# Patient Record
Sex: FEMALE | Race: WHITE | NOT HISPANIC OR LATINO | Employment: OTHER | URBAN - NONMETROPOLITAN AREA
[De-identification: names, ages, dates, MRNs, and addresses within clinical notes are randomized per-mention and may not be internally consistent; named-entity substitution may affect disease eponyms.]

---

## 2019-10-02 ENCOUNTER — HOSPITAL ENCOUNTER (INPATIENT)
Facility: HOSPITAL | Age: 84
LOS: 1 days | Discharge: ANOTHER HOSPITAL | End: 2019-10-03
Attending: EMERGENCY MEDICINE | Admitting: HOSPITALIST

## 2019-10-02 ENCOUNTER — APPOINTMENT (OUTPATIENT)
Dept: GENERAL RADIOLOGY | Facility: HOSPITAL | Age: 84
End: 2019-10-02

## 2019-10-02 DIAGNOSIS — K85.00 IDIOPATHIC ACUTE PANCREATITIS WITHOUT INFECTION OR NECROSIS: Primary | ICD-10-CM

## 2019-10-02 DIAGNOSIS — R11.2 NAUSEA AND VOMITING IN ADULT PATIENT: ICD-10-CM

## 2019-10-02 DIAGNOSIS — A41.9 SEPSIS SECONDARY TO UTI (HCC): ICD-10-CM

## 2019-10-02 DIAGNOSIS — E86.0 DEHYDRATION: ICD-10-CM

## 2019-10-02 DIAGNOSIS — N39.0 SEPSIS SECONDARY TO UTI (HCC): ICD-10-CM

## 2019-10-02 LAB
A-A DO2: ABNORMAL
ARTERIAL PATENCY WRIST A: POSITIVE
ATMOSPHERIC PRESS: 728 MMHG
BASE EXCESS BLDA CALC-SCNC: -4.3 MMOL/L (ref 0–2)
BDY SITE: ABNORMAL
BODY TEMPERATURE: 0 C
CO2 BLDA-SCNC: 21.6 MMOL/L (ref 22–33)
COHGB MFR BLD: 0.4 % (ref 0–5)
HCO3 BLDA-SCNC: 20.5 MMOL/L (ref 20–26)
HGB BLDA-MCNC: 14.1 G/DL (ref 13.5–17.5)
HOROWITZ INDEX BLD+IHG-RTO: 21 %
Lab: ABNORMAL
METHGB BLD QL: 0.5 % (ref 0–3)
MODALITY: ABNORMAL
NOTE: ABNORMAL
OXYHGB MFR BLDV: 89 % (ref 94–99)
PCO2 BLDA: 36.1 MM HG (ref 35–45)
PCO2 TEMP ADJ BLD: ABNORMAL MM[HG]
PH BLDA: 7.36 PH UNITS (ref 7.35–7.45)
PH, TEMP CORRECTED: ABNORMAL
PO2 BLDA: 61 MM HG (ref 83–108)
PO2 TEMP ADJ BLD: ABNORMAL MM[HG]
SAO2 % BLDCOA: 89.8 % (ref 94–99)
VENTILATOR MODE: ABNORMAL

## 2019-10-02 PROCEDURE — 82375 ASSAY CARBOXYHB QUANT: CPT

## 2019-10-02 PROCEDURE — 99284 EMERGENCY DEPT VISIT MOD MDM: CPT

## 2019-10-02 PROCEDURE — 82805 BLOOD GASES W/O2 SATURATION: CPT

## 2019-10-02 PROCEDURE — 36415 COLL VENOUS BLD VENIPUNCTURE: CPT

## 2019-10-02 PROCEDURE — 93005 ELECTROCARDIOGRAM TRACING: CPT | Performed by: EMERGENCY MEDICINE

## 2019-10-02 PROCEDURE — 83050 HGB METHEMOGLOBIN QUAN: CPT

## 2019-10-02 PROCEDURE — P9612 CATHETERIZE FOR URINE SPEC: HCPCS

## 2019-10-02 PROCEDURE — 36600 WITHDRAWAL OF ARTERIAL BLOOD: CPT

## 2019-10-02 PROCEDURE — 71045 X-RAY EXAM CHEST 1 VIEW: CPT

## 2019-10-02 RX ORDER — SODIUM CHLORIDE 0.9 % (FLUSH) 0.9 %
10 SYRINGE (ML) INJECTION AS NEEDED
Status: DISCONTINUED | OUTPATIENT
Start: 2019-10-02 | End: 2019-10-04 | Stop reason: HOSPADM

## 2019-10-02 RX ORDER — ONDANSETRON 2 MG/ML
4 INJECTION INTRAMUSCULAR; INTRAVENOUS ONCE
Status: COMPLETED | OUTPATIENT
Start: 2019-10-02 | End: 2019-10-03

## 2019-10-03 ENCOUNTER — APPOINTMENT (OUTPATIENT)
Dept: CT IMAGING | Facility: HOSPITAL | Age: 84
End: 2019-10-03

## 2019-10-03 ENCOUNTER — APPOINTMENT (OUTPATIENT)
Dept: MRI IMAGING | Facility: HOSPITAL | Age: 84
End: 2019-10-03

## 2019-10-03 ENCOUNTER — APPOINTMENT (OUTPATIENT)
Dept: ULTRASOUND IMAGING | Facility: HOSPITAL | Age: 84
End: 2019-10-03

## 2019-10-03 ENCOUNTER — APPOINTMENT (OUTPATIENT)
Dept: CARDIOLOGY | Facility: HOSPITAL | Age: 84
End: 2019-10-03

## 2019-10-03 ENCOUNTER — APPOINTMENT (OUTPATIENT)
Dept: GENERAL RADIOLOGY | Facility: HOSPITAL | Age: 84
End: 2019-10-03

## 2019-10-03 VITALS
BODY MASS INDEX: 32.98 KG/M2 | SYSTOLIC BLOOD PRESSURE: 125 MMHG | TEMPERATURE: 98.2 F | RESPIRATION RATE: 20 BRPM | WEIGHT: 168 LBS | OXYGEN SATURATION: 95 % | HEART RATE: 73 BPM | HEIGHT: 60 IN | DIASTOLIC BLOOD PRESSURE: 45 MMHG

## 2019-10-03 PROBLEM — K85.90 ACUTE PANCREATITIS: Status: ACTIVE | Noted: 2019-10-03

## 2019-10-03 LAB
ALBUMIN SERPL-MCNC: 2.67 G/DL (ref 3.5–5.2)
ALBUMIN SERPL-MCNC: 2.83 G/DL (ref 3.5–5.2)
ALBUMIN/GLOB SERPL: 0.8 G/DL
ALBUMIN/GLOB SERPL: 0.8 G/DL
ALP SERPL-CCNC: 137 U/L (ref 39–117)
ALP SERPL-CCNC: 145 U/L (ref 39–117)
ALT SERPL W P-5'-P-CCNC: 38 U/L (ref 1–33)
ALT SERPL W P-5'-P-CCNC: 41 U/L (ref 1–33)
AMYLASE SERPL-CCNC: 789 U/L (ref 28–100)
ANION GAP SERPL CALCULATED.3IONS-SCNC: 14.3 MMOL/L (ref 5–15)
ANION GAP SERPL CALCULATED.3IONS-SCNC: 14.4 MMOL/L (ref 5–15)
APTT PPP: 29.4 SECONDS (ref 23.8–36.1)
AST SERPL-CCNC: 93 U/L (ref 1–32)
AST SERPL-CCNC: 95 U/L (ref 1–32)
BACTERIA UR QL AUTO: ABNORMAL /HPF
BASOPHILS # BLD AUTO: 0.01 10*3/MM3 (ref 0–0.2)
BASOPHILS # BLD AUTO: 0.01 10*3/MM3 (ref 0–0.2)
BASOPHILS NFR BLD AUTO: 0.1 % (ref 0–1.5)
BASOPHILS NFR BLD AUTO: 0.1 % (ref 0–1.5)
BILIRUB SERPL-MCNC: 0.9 MG/DL (ref 0.2–1.2)
BILIRUB SERPL-MCNC: 1.1 MG/DL (ref 0.2–1.2)
BILIRUB UR QL STRIP: NEGATIVE
BUN BLD-MCNC: 17 MG/DL (ref 8–23)
BUN BLD-MCNC: 19 MG/DL (ref 8–23)
BUN/CREAT SERPL: 14.7 (ref 7–25)
BUN/CREAT SERPL: 15.8 (ref 7–25)
CALCIUM SPEC-SCNC: 9.2 MG/DL (ref 8.6–10.5)
CALCIUM SPEC-SCNC: 9.4 MG/DL (ref 8.6–10.5)
CHLORIDE SERPL-SCNC: 104 MMOL/L (ref 98–107)
CHLORIDE SERPL-SCNC: 104 MMOL/L (ref 98–107)
CHOLEST SERPL-MCNC: 78 MG/DL (ref 0–200)
CLARITY UR: ABNORMAL
CO2 SERPL-SCNC: 20.7 MMOL/L (ref 22–29)
CO2 SERPL-SCNC: 21.6 MMOL/L (ref 22–29)
COLOR UR: ABNORMAL
CREAT BLD-MCNC: 1.16 MG/DL (ref 0.57–1)
CREAT BLD-MCNC: 1.2 MG/DL (ref 0.57–1)
CRP SERPL-MCNC: 0.62 MG/DL (ref 0–0.5)
D-LACTATE SERPL-SCNC: 1.2 MMOL/L (ref 0.5–2)
D-LACTATE SERPL-SCNC: 2.9 MMOL/L (ref 0.5–2)
D-LACTATE SERPL-SCNC: 3.7 MMOL/L (ref 0.5–2)
DEPRECATED RDW RBC AUTO: 53.6 FL (ref 37–54)
DEPRECATED RDW RBC AUTO: 55.3 FL (ref 37–54)
EOSINOPHIL # BLD AUTO: 0 10*3/MM3 (ref 0–0.4)
EOSINOPHIL # BLD AUTO: 0 10*3/MM3 (ref 0–0.4)
EOSINOPHIL NFR BLD AUTO: 0 % (ref 0.3–6.2)
EOSINOPHIL NFR BLD AUTO: 0 % (ref 0.3–6.2)
ERYTHROCYTE [DISTWIDTH] IN BLOOD BY AUTOMATED COUNT: 15.4 % (ref 12.3–15.4)
ERYTHROCYTE [DISTWIDTH] IN BLOOD BY AUTOMATED COUNT: 15.4 % (ref 12.3–15.4)
GFR SERPL CREATININE-BSD FRML MDRD: 43 ML/MIN/1.73
GFR SERPL CREATININE-BSD FRML MDRD: 45 ML/MIN/1.73
GLOBULIN UR ELPH-MCNC: 3.4 GM/DL
GLOBULIN UR ELPH-MCNC: 3.5 GM/DL
GLUCOSE BLD-MCNC: 106 MG/DL (ref 65–99)
GLUCOSE BLD-MCNC: 117 MG/DL (ref 65–99)
GLUCOSE UR STRIP-MCNC: NEGATIVE MG/DL
GRAN CASTS URNS QL MICRO: ABNORMAL /LPF
HCT VFR BLD AUTO: 40.6 % (ref 34–46.6)
HCT VFR BLD AUTO: 41.8 % (ref 34–46.6)
HDLC SERPL-MCNC: 35 MG/DL (ref 40–60)
HGB BLD-MCNC: 13.3 G/DL (ref 12–15.9)
HGB BLD-MCNC: 13.7 G/DL (ref 12–15.9)
HGB UR QL STRIP.AUTO: ABNORMAL
HOLD SPECIMEN: NORMAL
HYALINE CASTS UR QL AUTO: ABNORMAL /LPF
IMM GRANULOCYTES # BLD AUTO: 0.03 10*3/MM3 (ref 0–0.05)
IMM GRANULOCYTES # BLD AUTO: 0.05 10*3/MM3 (ref 0–0.05)
IMM GRANULOCYTES NFR BLD AUTO: 0.3 % (ref 0–0.5)
IMM GRANULOCYTES NFR BLD AUTO: 0.3 % (ref 0–0.5)
INR PPP: 1.14 (ref 0.9–1.1)
KETONES UR QL STRIP: ABNORMAL
LDLC SERPL CALC-MCNC: 35 MG/DL (ref 0–100)
LDLC/HDLC SERPL: 0.99 {RATIO}
LEUKOCYTE ESTERASE UR QL STRIP.AUTO: ABNORMAL
LIPASE SERPL-CCNC: >3000 U/L (ref 13–60)
LIPASE SERPL-CCNC: >3000 U/L (ref 13–60)
LYMPHOCYTES # BLD AUTO: 0.76 10*3/MM3 (ref 0.7–3.1)
LYMPHOCYTES # BLD AUTO: 1.22 10*3/MM3 (ref 0.7–3.1)
LYMPHOCYTES NFR BLD AUTO: 10.3 % (ref 19.6–45.3)
LYMPHOCYTES NFR BLD AUTO: 5.2 % (ref 19.6–45.3)
MAGNESIUM SERPL-MCNC: 1.4 MG/DL (ref 1.6–2.4)
MAGNESIUM SERPL-MCNC: 1.5 MG/DL (ref 1.6–2.4)
MAGNESIUM SERPL-MCNC: 2.5 MG/DL (ref 1.6–2.4)
MCH RBC QN AUTO: 32.4 PG (ref 26.6–33)
MCH RBC QN AUTO: 32.8 PG (ref 26.6–33)
MCHC RBC AUTO-ENTMCNC: 32.8 G/DL (ref 31.5–35.7)
MCHC RBC AUTO-ENTMCNC: 32.8 G/DL (ref 31.5–35.7)
MCV RBC AUTO: 100 FL (ref 79–97)
MCV RBC AUTO: 99 FL (ref 79–97)
MONOCYTES # BLD AUTO: 0.57 10*3/MM3 (ref 0.1–0.9)
MONOCYTES # BLD AUTO: 1.24 10*3/MM3 (ref 0.1–0.9)
MONOCYTES NFR BLD AUTO: 4.8 % (ref 5–12)
MONOCYTES NFR BLD AUTO: 8.5 % (ref 5–12)
NEUTROPHILS # BLD AUTO: 12.48 10*3/MM3 (ref 1.7–7)
NEUTROPHILS # BLD AUTO: 9.99 10*3/MM3 (ref 1.7–7)
NEUTROPHILS NFR BLD AUTO: 84.5 % (ref 42.7–76)
NEUTROPHILS NFR BLD AUTO: 85.9 % (ref 42.7–76)
NITRITE UR QL STRIP: NEGATIVE
PH UR STRIP.AUTO: 6.5 [PH] (ref 5–8)
PHOSPHATE SERPL-MCNC: 3.5 MG/DL (ref 2.5–4.5)
PLATELET # BLD AUTO: 163 10*3/MM3 (ref 140–450)
PLATELET # BLD AUTO: 171 10*3/MM3 (ref 140–450)
PMV BLD AUTO: 10.5 FL (ref 6–12)
PMV BLD AUTO: 11.2 FL (ref 6–12)
POTASSIUM BLD-SCNC: 3.7 MMOL/L (ref 3.5–5.2)
POTASSIUM BLD-SCNC: 4.4 MMOL/L (ref 3.5–5.2)
PROT SERPL-MCNC: 6.2 G/DL (ref 6–8.5)
PROT SERPL-MCNC: 6.2 G/DL (ref 6–8.5)
PROT UR QL STRIP: ABNORMAL
PROTHROMBIN TIME: 15.2 SECONDS (ref 11–15.4)
RBC # BLD AUTO: 4.1 10*6/MM3 (ref 3.77–5.28)
RBC # BLD AUTO: 4.18 10*6/MM3 (ref 3.77–5.28)
RBC # UR: ABNORMAL /HPF
REF LAB TEST METHOD: ABNORMAL
SODIUM BLD-SCNC: 139 MMOL/L (ref 136–145)
SODIUM BLD-SCNC: 140 MMOL/L (ref 136–145)
SP GR UR STRIP: 1.01 (ref 1–1.03)
SQUAMOUS #/AREA URNS HPF: ABNORMAL /HPF
TRANS CELLS #/AREA URNS HPF: ABNORMAL /HPF
TRIGL SERPL-MCNC: 40 MG/DL (ref 0–150)
TRIGL SERPL-MCNC: 42 MG/DL (ref 0–150)
TROPONIN T SERPL-MCNC: <0.01 NG/ML (ref 0–0.03)
UROBILINOGEN UR QL STRIP: ABNORMAL
VLDLC SERPL-MCNC: 8.4 MG/DL
WBC NRBC COR # BLD: 11.82 10*3/MM3 (ref 3.4–10.8)
WBC NRBC COR # BLD: 14.54 10*3/MM3 (ref 3.4–10.8)
WBC UR QL AUTO: ABNORMAL /HPF
WHOLE BLOOD HOLD SPECIMEN: NORMAL
WHOLE BLOOD HOLD SPECIMEN: NORMAL

## 2019-10-03 PROCEDURE — 93880 EXTRACRANIAL BILAT STUDY: CPT | Performed by: RADIOLOGY

## 2019-10-03 PROCEDURE — 25010000002 CEFTRIAXONE: Performed by: EMERGENCY MEDICINE

## 2019-10-03 PROCEDURE — 85730 THROMBOPLASTIN TIME PARTIAL: CPT | Performed by: EMERGENCY MEDICINE

## 2019-10-03 PROCEDURE — 74176 CT ABD & PELVIS W/O CONTRAST: CPT

## 2019-10-03 PROCEDURE — 87077 CULTURE AEROBIC IDENTIFY: CPT | Performed by: EMERGENCY MEDICINE

## 2019-10-03 PROCEDURE — 86140 C-REACTIVE PROTEIN: CPT | Performed by: EMERGENCY MEDICINE

## 2019-10-03 PROCEDURE — 93880 EXTRACRANIAL BILAT STUDY: CPT

## 2019-10-03 PROCEDURE — 73610 X-RAY EXAM OF ANKLE: CPT | Performed by: RADIOLOGY

## 2019-10-03 PROCEDURE — 70450 CT HEAD/BRAIN W/O DYE: CPT

## 2019-10-03 PROCEDURE — 99236 HOSP IP/OBS SAME DATE HI 85: CPT | Performed by: HOSPITALIST

## 2019-10-03 PROCEDURE — 25010000002 MORPHINE PER 10 MG: Performed by: HOSPITALIST

## 2019-10-03 PROCEDURE — 82150 ASSAY OF AMYLASE: CPT | Performed by: HOSPITALIST

## 2019-10-03 PROCEDURE — 94799 UNLISTED PULMONARY SVC/PX: CPT

## 2019-10-03 PROCEDURE — 83605 ASSAY OF LACTIC ACID: CPT | Performed by: HOSPITALIST

## 2019-10-03 PROCEDURE — 80053 COMPREHEN METABOLIC PANEL: CPT | Performed by: EMERGENCY MEDICINE

## 2019-10-03 PROCEDURE — 83690 ASSAY OF LIPASE: CPT | Performed by: EMERGENCY MEDICINE

## 2019-10-03 PROCEDURE — 84484 ASSAY OF TROPONIN QUANT: CPT | Performed by: EMERGENCY MEDICINE

## 2019-10-03 PROCEDURE — 80061 LIPID PANEL: CPT | Performed by: HOSPITALIST

## 2019-10-03 PROCEDURE — 84100 ASSAY OF PHOSPHORUS: CPT | Performed by: EMERGENCY MEDICINE

## 2019-10-03 PROCEDURE — 83605 ASSAY OF LACTIC ACID: CPT | Performed by: EMERGENCY MEDICINE

## 2019-10-03 PROCEDURE — 74181 MRI ABDOMEN W/O CONTRAST: CPT

## 2019-10-03 PROCEDURE — 83735 ASSAY OF MAGNESIUM: CPT | Performed by: EMERGENCY MEDICINE

## 2019-10-03 PROCEDURE — 87086 URINE CULTURE/COLONY COUNT: CPT | Performed by: EMERGENCY MEDICINE

## 2019-10-03 PROCEDURE — 25010000002 ONDANSETRON PER 1 MG: Performed by: EMERGENCY MEDICINE

## 2019-10-03 PROCEDURE — 25010000002 HEPARIN (PORCINE) PER 1000 UNITS: Performed by: HOSPITALIST

## 2019-10-03 PROCEDURE — 85025 COMPLETE CBC W/AUTO DIFF WBC: CPT | Performed by: HOSPITALIST

## 2019-10-03 PROCEDURE — 81001 URINALYSIS AUTO W/SCOPE: CPT | Performed by: EMERGENCY MEDICINE

## 2019-10-03 PROCEDURE — 80053 COMPREHEN METABOLIC PANEL: CPT | Performed by: HOSPITALIST

## 2019-10-03 PROCEDURE — 85610 PROTHROMBIN TIME: CPT | Performed by: EMERGENCY MEDICINE

## 2019-10-03 PROCEDURE — 84478 ASSAY OF TRIGLYCERIDES: CPT | Performed by: HOSPITALIST

## 2019-10-03 PROCEDURE — 83735 ASSAY OF MAGNESIUM: CPT | Performed by: HOSPITALIST

## 2019-10-03 PROCEDURE — 87186 SC STD MICRODIL/AGAR DIL: CPT | Performed by: EMERGENCY MEDICINE

## 2019-10-03 PROCEDURE — 25010000002 MAGNESIUM SULFATE IN D5W 1G/100ML (PREMIX) 1-5 GM/100ML-% SOLUTION: Performed by: HOSPITALIST

## 2019-10-03 PROCEDURE — 73610 X-RAY EXAM OF ANKLE: CPT

## 2019-10-03 PROCEDURE — 83690 ASSAY OF LIPASE: CPT | Performed by: HOSPITALIST

## 2019-10-03 PROCEDURE — 74181 MRI ABDOMEN W/O CONTRAST: CPT | Performed by: RADIOLOGY

## 2019-10-03 PROCEDURE — 76705 ECHO EXAM OF ABDOMEN: CPT

## 2019-10-03 PROCEDURE — 85025 COMPLETE CBC W/AUTO DIFF WBC: CPT | Performed by: EMERGENCY MEDICINE

## 2019-10-03 PROCEDURE — 87040 BLOOD CULTURE FOR BACTERIA: CPT | Performed by: EMERGENCY MEDICINE

## 2019-10-03 RX ORDER — SODIUM CHLORIDE 0.9 % (FLUSH) 0.9 %
10 SYRINGE (ML) INJECTION EVERY 12 HOURS SCHEDULED
Status: DISCONTINUED | OUTPATIENT
Start: 2019-10-03 | End: 2019-10-04 | Stop reason: HOSPADM

## 2019-10-03 RX ORDER — HEPARIN SODIUM 5000 [USP'U]/ML
5000 INJECTION, SOLUTION INTRAVENOUS; SUBCUTANEOUS EVERY 12 HOURS SCHEDULED
Start: 2019-10-03 | End: 2019-10-06 | Stop reason: HOSPADM

## 2019-10-03 RX ORDER — MAGNESIUM SULFATE 1 G/100ML
1 INJECTION INTRAVENOUS
Status: COMPLETED | OUTPATIENT
Start: 2019-10-03 | End: 2019-10-03

## 2019-10-03 RX ORDER — LIDOCAINE 50 MG/G
1 PATCH TOPICAL
Start: 2019-10-04 | End: 2019-10-06 | Stop reason: HOSPADM

## 2019-10-03 RX ORDER — TRIAMTERENE AND HYDROCHLOROTHIAZIDE 37.5; 25 MG/1; MG/1
1 TABLET ORAL DAILY
COMMUNITY
End: 2019-10-04 | Stop reason: HOSPADM

## 2019-10-03 RX ORDER — NALOXONE HCL 0.4 MG/ML
0.4 VIAL (ML) INJECTION
Status: DISCONTINUED | OUTPATIENT
Start: 2019-10-03 | End: 2019-10-03

## 2019-10-03 RX ORDER — MAGNESIUM SULFATE 1 G/100ML
1 INJECTION INTRAVENOUS AS NEEDED
Status: DISCONTINUED | OUTPATIENT
Start: 2019-10-03 | End: 2019-10-04 | Stop reason: HOSPADM

## 2019-10-03 RX ORDER — FAMOTIDINE 10 MG/ML
20 INJECTION, SOLUTION INTRAVENOUS DAILY
Status: DISCONTINUED | OUTPATIENT
Start: 2019-10-03 | End: 2019-10-04 | Stop reason: HOSPADM

## 2019-10-03 RX ORDER — L.ACID,PARA/B.BIFIDUM/S.THERM 8B CELL
1 CAPSULE ORAL DAILY
Status: ON HOLD
Start: 2019-10-04 | End: 2019-10-06 | Stop reason: SDUPTHER

## 2019-10-03 RX ORDER — MAGNESIUM SULFATE HEPTAHYDRATE 40 MG/ML
4 INJECTION, SOLUTION INTRAVENOUS AS NEEDED
Start: 2019-10-03 | End: 2019-10-06 | Stop reason: HOSPADM

## 2019-10-03 RX ORDER — FAMOTIDINE 10 MG/ML
20 INJECTION, SOLUTION INTRAVENOUS DAILY
Start: 2019-10-04 | End: 2019-10-16 | Stop reason: HOSPADM

## 2019-10-03 RX ORDER — MORPHINE SULFATE 2 MG/ML
2 INJECTION, SOLUTION INTRAMUSCULAR; INTRAVENOUS EVERY 4 HOURS PRN
Status: DISCONTINUED | OUTPATIENT
Start: 2019-10-03 | End: 2019-10-04 | Stop reason: HOSPADM

## 2019-10-03 RX ORDER — PROMETHAZINE HYDROCHLORIDE 12.5 MG/1
12.5 TABLET ORAL EVERY 6 HOURS PRN
Status: DISCONTINUED | OUTPATIENT
Start: 2019-10-03 | End: 2019-10-04 | Stop reason: HOSPADM

## 2019-10-03 RX ORDER — L.ACID,PARA/B.BIFIDUM/S.THERM 8B CELL
1 CAPSULE ORAL DAILY
Status: DISCONTINUED | OUTPATIENT
Start: 2019-10-03 | End: 2019-10-04 | Stop reason: HOSPADM

## 2019-10-03 RX ORDER — ONDANSETRON 2 MG/ML
4 INJECTION INTRAMUSCULAR; INTRAVENOUS EVERY 6 HOURS PRN
Start: 2019-10-03 | End: 2019-10-16 | Stop reason: HOSPADM

## 2019-10-03 RX ORDER — SODIUM CHLORIDE 0.9 % (FLUSH) 0.9 %
10 SYRINGE (ML) INJECTION AS NEEDED
Status: DISCONTINUED | OUTPATIENT
Start: 2019-10-03 | End: 2019-10-04 | Stop reason: HOSPADM

## 2019-10-03 RX ORDER — PROMETHAZINE HYDROCHLORIDE 12.5 MG/1
12.5 TABLET ORAL EVERY 6 HOURS PRN
Start: 2019-10-03 | End: 2019-10-06 | Stop reason: HOSPADM

## 2019-10-03 RX ORDER — ENALAPRIL MALEATE 10 MG/1
20 TABLET ORAL DAILY
Status: CANCELLED | OUTPATIENT
Start: 2019-10-03

## 2019-10-03 RX ORDER — HEPARIN SODIUM 5000 [USP'U]/ML
5000 INJECTION, SOLUTION INTRAVENOUS; SUBCUTANEOUS EVERY 12 HOURS SCHEDULED
Status: DISCONTINUED | OUTPATIENT
Start: 2019-10-03 | End: 2019-10-03

## 2019-10-03 RX ORDER — NALOXONE HCL 0.4 MG/ML
0.4 VIAL (ML) INJECTION
Status: DISCONTINUED | OUTPATIENT
Start: 2019-10-03 | End: 2019-10-04 | Stop reason: HOSPADM

## 2019-10-03 RX ORDER — SODIUM CHLORIDE 9 MG/ML
125 INJECTION, SOLUTION INTRAVENOUS CONTINUOUS
Start: 2019-10-03 | End: 2019-10-06 | Stop reason: HOSPADM

## 2019-10-03 RX ORDER — MORPHINE SULFATE 2 MG/ML
2 INJECTION, SOLUTION INTRAMUSCULAR; INTRAVENOUS EVERY 4 HOURS PRN
Start: 2019-10-03 | End: 2019-10-06 | Stop reason: HOSPADM

## 2019-10-03 RX ORDER — MORPHINE SULFATE 2 MG/ML
1 INJECTION, SOLUTION INTRAMUSCULAR; INTRAVENOUS EVERY 4 HOURS PRN
Status: DISCONTINUED | OUTPATIENT
Start: 2019-10-03 | End: 2019-10-03

## 2019-10-03 RX ORDER — MAGNESIUM SULFATE 1 G/100ML
1 INJECTION INTRAVENOUS AS NEEDED
Start: 2019-10-03 | End: 2019-10-06 | Stop reason: HOSPADM

## 2019-10-03 RX ORDER — ENALAPRIL MALEATE 20 MG/1
20 TABLET ORAL DAILY
COMMUNITY
End: 2019-10-04 | Stop reason: HOSPADM

## 2019-10-03 RX ORDER — SODIUM CHLORIDE 9 MG/ML
125 INJECTION, SOLUTION INTRAVENOUS CONTINUOUS
Status: DISCONTINUED | OUTPATIENT
Start: 2019-10-03 | End: 2019-10-04 | Stop reason: HOSPADM

## 2019-10-03 RX ORDER — NALOXONE HCL 0.4 MG/ML
0.4 VIAL (ML) INJECTION
Start: 2019-10-03 | End: 2019-10-06 | Stop reason: HOSPADM

## 2019-10-03 RX ORDER — MAGNESIUM SULFATE HEPTAHYDRATE 40 MG/ML
4 INJECTION, SOLUTION INTRAVENOUS AS NEEDED
Status: DISCONTINUED | OUTPATIENT
Start: 2019-10-03 | End: 2019-10-04 | Stop reason: HOSPADM

## 2019-10-03 RX ORDER — TRIAMTERENE AND HYDROCHLOROTHIAZIDE 37.5; 25 MG/1; MG/1
1 TABLET ORAL DAILY
Status: CANCELLED | OUTPATIENT
Start: 2019-10-03

## 2019-10-03 RX ORDER — LIDOCAINE 50 MG/G
1 PATCH TOPICAL
Status: DISCONTINUED | OUTPATIENT
Start: 2019-10-03 | End: 2019-10-04 | Stop reason: HOSPADM

## 2019-10-03 RX ORDER — ONDANSETRON 2 MG/ML
4 INJECTION INTRAMUSCULAR; INTRAVENOUS EVERY 6 HOURS PRN
Status: DISCONTINUED | OUTPATIENT
Start: 2019-10-03 | End: 2019-10-04 | Stop reason: HOSPADM

## 2019-10-03 RX ORDER — MAGNESIUM SULFATE HEPTAHYDRATE 40 MG/ML
2 INJECTION, SOLUTION INTRAVENOUS AS NEEDED
Status: DISCONTINUED | OUTPATIENT
Start: 2019-10-03 | End: 2019-10-04 | Stop reason: HOSPADM

## 2019-10-03 RX ORDER — HEPARIN SODIUM 5000 [USP'U]/ML
5000 INJECTION, SOLUTION INTRAVENOUS; SUBCUTANEOUS EVERY 8 HOURS SCHEDULED
Status: DISCONTINUED | OUTPATIENT
Start: 2019-10-03 | End: 2019-10-04 | Stop reason: HOSPADM

## 2019-10-03 RX ORDER — MAGNESIUM SULFATE HEPTAHYDRATE 40 MG/ML
2 INJECTION, SOLUTION INTRAVENOUS AS NEEDED
Start: 2019-10-03 | End: 2019-10-06 | Stop reason: HOSPADM

## 2019-10-03 RX ADMIN — MAGNESIUM SULFATE IN DEXTROSE 1 G: 10 INJECTION, SOLUTION INTRAVENOUS at 08:07

## 2019-10-03 RX ADMIN — SODIUM CHLORIDE, PRESERVATIVE FREE 10 ML: 5 INJECTION INTRAVENOUS at 21:38

## 2019-10-03 RX ADMIN — SODIUM CHLORIDE 125 ML/HR: 9 INJECTION, SOLUTION INTRAVENOUS at 18:04

## 2019-10-03 RX ADMIN — HEPARIN SODIUM 5000 UNITS: 5000 INJECTION INTRAVENOUS; SUBCUTANEOUS at 08:07

## 2019-10-03 RX ADMIN — MAGNESIUM SULFATE IN DEXTROSE 1 G: 10 INJECTION, SOLUTION INTRAVENOUS at 09:46

## 2019-10-03 RX ADMIN — MORPHINE SULFATE 1 MG: 2 INJECTION, SOLUTION INTRAMUSCULAR; INTRAVENOUS at 06:32

## 2019-10-03 RX ADMIN — CEFTRIAXONE 1 G: 1 INJECTION, POWDER, FOR SOLUTION INTRAMUSCULAR; INTRAVENOUS at 02:31

## 2019-10-03 RX ADMIN — METRONIDAZOLE 500 MG: 500 INJECTION, SOLUTION INTRAVENOUS at 18:09

## 2019-10-03 RX ADMIN — MORPHINE SULFATE 1 MG: 2 INJECTION, SOLUTION INTRAMUSCULAR; INTRAVENOUS at 15:11

## 2019-10-03 RX ADMIN — SODIUM CHLORIDE, PRESERVATIVE FREE 10 ML: 5 INJECTION INTRAVENOUS at 08:07

## 2019-10-03 RX ADMIN — SODIUM CHLORIDE 125 ML/HR: 9 INJECTION, SOLUTION INTRAVENOUS at 05:26

## 2019-10-03 RX ADMIN — MAGNESIUM SULFATE IN DEXTROSE 1 G: 10 INJECTION, SOLUTION INTRAVENOUS at 06:22

## 2019-10-03 RX ADMIN — ONDANSETRON 4 MG: 2 INJECTION INTRAMUSCULAR; INTRAVENOUS at 00:08

## 2019-10-03 RX ADMIN — LIDOCAINE 1 PATCH: 50 PATCH CUTANEOUS at 21:35

## 2019-10-03 RX ADMIN — Medication 1 CAPSULE: at 13:02

## 2019-10-03 RX ADMIN — MORPHINE SULFATE 2 MG: 2 INJECTION, SOLUTION INTRAMUSCULAR; INTRAVENOUS at 21:37

## 2019-10-03 RX ADMIN — SODIUM CHLORIDE 1000 ML: 9 INJECTION, SOLUTION INTRAVENOUS at 00:08

## 2019-10-03 RX ADMIN — SODIUM CHLORIDE, PRESERVATIVE FREE 10 ML: 5 INJECTION INTRAVENOUS at 10:09

## 2019-10-03 RX ADMIN — FAMOTIDINE 20 MG: 10 INJECTION, SOLUTION INTRAVENOUS at 10:09

## 2019-10-03 RX ADMIN — HEPARIN SODIUM 5000 UNITS: 5000 INJECTION INTRAVENOUS; SUBCUTANEOUS at 21:37

## 2019-10-03 RX ADMIN — HEPARIN SODIUM 5000 UNITS: 5000 INJECTION INTRAVENOUS; SUBCUTANEOUS at 14:34

## 2019-10-03 RX ADMIN — SODIUM CHLORIDE 1200 ML: 9 INJECTION, SOLUTION INTRAVENOUS at 10:08

## 2019-10-03 NOTE — PROGRESS NOTES
[facility] HOSPITALIST PROGRESS NOTE     Patient Identification:  Name:  Janeth Mitchell  Age:  84 y.o.  Sex:  female  :  1935  MRN:  [id number]  Visit Number:  [id number]  ROOM: [address]     Primary Care Provider:  Alexx Tafoya MD    Length of stay:  0    Subjective        Chief Compliant Follow up for pancreatitis    Patient seen and examined this morning with daughter and KENNETH Pearce. Doing better. Abdominal pain much improved. Denies nausea and vomiting. Denies chest pain, shortness of breath, cough. Afebrile since admission. Has left ankle pain. On RA.       Objective     Current Hospital Meds:    [START ON 10/4/2019] ceftriaxone 1 g Intravenous Q24H   famotidine 20 mg Intravenous Daily   heparin (porcine) 5,000 Units Subcutaneous Q8H   lactobacillus acidophilus 1 capsule Oral Daily   sodium chloride 10 mL Intravenous Q12H   sodium chloride 10 mL Intravenous Q12H       sodium chloride 125 mL/hr Last Rate: 125 mL/hr (10/03/19 0526)     ----------------------------------------------------------------------------------------------------------------------  Vital Signs:  Temp:  [97.9 °F (36.6 °C)-98.5 °F (36.9 °C)] 98.5 °F (36.9 °C)  Heart Rate:  [63-89] 63  Resp:  [11-20] 11  BP: ()/(37-73) 138/67  SpO2:  [92 %-100 %] 99 %  on   ;   Device (Oxygen Therapy): room air  Body mass index is 32.81 kg/m².    Wt Readings from Last 3 Encounters:   10/03/19 76.2 kg (168 lb)       Intake/Output Summary (Last 24 hours) at 10/3/2019 1506  Last data filed at 10/3/2019 1300  Gross per 24 hour   Intake 3300 ml   Output 100 ml   Net 3200 ml     NPO Diet  ----------------------------------------------------------------------------------------------------------------------  Physical exam:  General: Comfortable, Not in distress.  Well-developed and well-nourished.   HENT:  Head:  Normocephalic and atraumatic.  Mouth:  Moist mucous membranes.    Eyes:  Conjunctivae and EOM are normal.  Pupils are  equal, round, and reactive to light.  No scleral icterus.    Neck:  Neck supple.  No JVD present. Trachea midline.     Cardiovascular:  Normal rate, regular rhythm with no murmur.  Pulmonary/Chest:  No respiratory distress, no wheezes, no crackles, with normal breath sounds and good air movement.  Abdomen:  Soft.  Bowel sounds are normal.  No distension and + epigastric tenderness. No organomegaly.   Musculoskeletal:  No edema, no tenderness, and no deformity.  No red or swollen joints anywhere.    Neurological:  Alert and oriented to person, place, and time.  No cranial nerve deficit. No focal deficits. No facial droop.  No slurred speech.   Skin:  Skin is warm and dry. No rash noted. No pallor.   Peripheral vascular:  pulses in all 4 extremities with no clubbing, no cyanosis, no edema.  Genitourinary: no reed  ----------------------------------------------------------------------------------------------------------------------  ----------------------------------------------------------------------------------------------------------------------  Results from last 7 days   Lab Units 10/03/19  1224 10/03/19  0602 10/03/19  0102   CRP mg/dL  --   --  0.62*   LACTATE mmol/L 1.2 2.9* 3.7*   WBC 10*3/mm3  --  11.82* 14.54*   HEMOGLOBIN g/dL  --  13.3 13.7   HEMATOCRIT %  --  40.6 41.8   MCV fL  --  99.0* 100.0*   MCHC g/dL  --  32.8 32.8   PLATELETS 10*3/mm3  --  163 171   INR   --   --  1.14*     Results from last 7 days   Lab Units 10/03/19  1418 10/03/19  0602 10/03/19  0102   SODIUM mmol/L  --  139 140   POTASSIUM mmol/L  --  4.4 3.7   MAGNESIUM mg/dL 2.5* 1.5* 1.4*   CHLORIDE mmol/L  --  104 104   CO2 mmol/L  --  20.7* 21.6*   BUN mg/dL  --  19 17   CREATININE mg/dL  --  1.20* 1.16*   PHOSPHORUS mg/dL  --   --  3.5   EGFR IF NONAFRICN AM mL/min/1.73  --  43* 45*   CALCIUM mg/dL  --  9.2 9.4   GLUCOSE mg/dL  --  117* 106*   ALBUMIN g/dL  --  2.67* 2.83*   BILIRUBIN mg/dL  --  0.9 1.1   ALK PHOS U/L  --  137* 145*    AST (SGOT) U/L  --  93* 95*   ALT (SGPT) U/L  --  41* 38*   Estimated Creatinine Clearance: 33 mL/min (A) (by C-G formula based on SCr of 1.2 mg/dL (H)).  Results from last 7 days   Lab Units 10/03/19  0102   TROPONIN T ng/mL <0.010     No results found for: HGBA1C, POCGLU  No results found for: AMMONIA  Results from last 7 days   Lab Units 10/03/19  0602   CHOLESTEROL mg/dL 78   TRIGLYCERIDES mg/dL 40  42   HDL CHOL mg/dL 35*   LDL CHOL mg/dL 35     Results from last 7 days   Lab Units 10/03/19  0130   NITRITE UA  Negative   WBC UA /HPF Too Numerous to Count*   BACTERIA UA /HPF 4+*   SQUAM EPITHEL UA /HPF 13-20*             I have personally looked at the labs and they are summarized above.  ----------------------------------------------------------------------------------------------------------------------  Imaging Results (last 24 hours)     Procedure Component Value Units Date/Time    US Carotid Bilateral [[id number]] Updated:  10/03/19 1457    MRI abdomen wo contrast mrcp [[id number]] Collected:  10/03/19 1403     Updated:  10/03/19 1409    Narrative:       EXAMINATION: MRI ABDOMEN WO CONTRAST MRCP-      CLINICAL INDICATION:     r/o biliary duct obstruction secondary to  gallstone. Severe pancreatitis; K85.00-Idiopathic acute pancreatitis  without necrosis or infection; A41.9-Sepsis, unspecified organism;  N39.0-Urinary tract infection, site not specified; E86.0-Dehydration;  R11.2-Nausea with vomiting, unspecified     COMPARISON: Ultrasound from 10/03/2019, CT from 10/03/2019     TECHNIQUE:  Multiplanar, multisequence MR imaging of the abdomen  performed without contrast. 3-D MRCP imaging acquired.      FINDINGS:   Layering hypointense material within the gallbladder which is somewhat  distended compatible with small layering stones or sludge.  Pericholecystic fluid is noted. The common hepatic duct is mildly  dilated and is approximately 7 mm in diameter. The common bile duct has  a maximal diameter of  approximately 8 mm. There is a small signal defect  noted in the distal common bile duct near the ampulla best seen on axial  T2 sequence image #19 that is most consistent with a distal common duct  stone and is just beyond the pancreatic duct origin.     The main pancreatic duct is within normal limits for diameter is  approximately 1 mm. There are at least 2 contiguous or closely  approximating cysts emanating from the superior margin of the pancreatic  body in the mid body region the largest of which is 2.0 cm and are most  consistent with small pancreatic pseudocysts. There is a mild degree of  peripancreatic fluid compatible with mild pancreatitis changes. No  necrosis identified.     T1 fat-suppressed images demonstrate no discrete mass within the  pancreas but contrast-enhanced exam offers improved characterization for  these findings.     Nodular liver margins are noted compatible with cirrhosis. Small  perihepatic and perisplenic ascites. Cardiomegaly and small pleural  effusions. No hydronephrosis identified. Renal parenchyma appears within  normal limits. Pericholecystic fluid likely related to ascites and  unlikely related to abnormal wall thickening and inflammatory change.     Bony structures are unremarkable. No upper abdominal adenopathy  identified.        Impression:          1. Small signal filling defect in the distal common bile duct at the  level of the ampulla most consistent with a small 4 mm common duct  stone.  2. Borderline dilated common bile duct and common hepatic duct as  detailed above with distention of the gallbladder.  3.Mild pancreatitis with at least 2 small pseudocysts noted the largest  of which is 2.0 cm.  4. Layering sludge and/or small stones within the gallbladder.  5. Liver cirrhosis which may account for patient's anasarca and mild  ascites.  6. Cardiomegaly and small pleural effusions.     This report was finalized on 10/3/2019 2:07 PM by Dr. Go Vann MD.         Gallbladder [[id number]] Collected:  10/03/19 0055     Updated:  10/03/19 0057    Narrative:       US Abdomen Ltd    INDICATION:   Right upper quadrant pain and tenderness and vomiting starting about a 30 tonight    COMPARISON:   None available.    FINDINGS:  This ultrasound limited to the gallbladder. The gallbladder is not distended. The wall is not thickened. No stones are identified. The common bile duct is nondilated.      Impression:       Negative gallbladder ultrasound    Signer Name: Ricki Perera MD   Signed: 10/3/2019 12:55 AM   Workstation Name: [facility]    Radiology [facility]    CT Abdomen Pelvis Without Contrast [[id number]] Collected:  10/03/19 0033     Updated:  10/03/19 0035    Narrative:       CT Abdomen Pelvis WO    INDICATION:   Upper abdominal pain tonight    TECHNIQUE:   CT of the abdomen and pelvis without IV contrast. Coronal and sagittal reconstructions were obtained.  Radiation dose reduction techniques included automated exposure control or exposure modulation based on body size. Count of known CT and cardiac nuc  med studies performed in previous 12 months: 0.     COMPARISON:   None available.    FINDINGS:  Abdomen: Lung bases are clear. The peripheral border the liver is nodular suggesting cirrhosis. No focal masses are identified. The gallbladder is distended and contains multiple small dependent stones. Spleen, adrenal glands and kidneys are normal.  There is inflammatory change within the fat around the pancreas. Brain parenchyma is otherwise normal. The aorta is normal in size. There is no adenopathy. Bowel is normal except for sigmoid diverticuli.    Pelvis: Uterus and adnexal regions and bladder are normal. Bones are unremarkable.      Impression:       Findings are consistent with acute pancreatitis.          Signer Name: Ricki Perera MD   Signed: 10/3/2019 12:33 AM   Workstation [facility]: [facility]    CT Head Without Contrast  [[id number]] Collected:  10/03/19 0027     Updated:  10/03/19 0029    Narrative:       CT Head WO    HISTORY:   Confusion delirium and altered level consciousness tonight    TECHNIQUE:   Axial unenhanced head CT. Radiation dose reduction techniques included automated exposure control or exposure modulation based on body size. Count of known CT and cardiac nuc med studies performed in previous 12 months: 0.     Time of scan:    COMPARISON:   None.    FINDINGS:   No intracranial hemorrhage, mass, or infarct. No hydrocephalus or extra-axial fluid collection. Brain parenchymal density is normal. The skull base, calvarium, and extracranial soft tissues are normal.      Impression:       Normal, negative unenhanced head CT.          Signer Name: Ricki Perera MD   Signed: 10/3/2019 12:27 AM   Workstation Name: [email or web address]    [facility]    XR Chest 1 View [[id number]] Collected:  10/03/19 0007     Updated:  10/03/19 0010    Narrative:       CR Chest 1 Vw    INDICATION:   Nausea vomiting, sepsis protocol     COMPARISON:    None available.    FINDINGS:  Single portable AP view(s) of the chest.  The heart and mediastinal contours are normal. The lungs are clear. No pneumothorax or pleural effusion.      Impression:       No acute cardiopulmonary findings.    Signer Name: Ricki Perera MD   Signed: 10/3/2019 12:07 AM   Workstation Name: [employer]    [facility]        I have personally reviewed the radiology images and read the final radiology report.    Assessment & Plan      Assessment:  Severe Sepsis  Acute calculous cholecystitis  Acute UTI  Acute gallstone pancreatitis  EMILIA  Lactic acidosis  Liver Cirrhosis, new diagnosis  Transaminitis  Syncope, likely vasovagal  Hypomagnesemia  Essential HTN    Plan:  Severe sepsis secondary to Acute calculous cholecystitis, acute gallstone pancreatitis and UTI. Continue with rocephin. Start on iv flagyl. Start on lactobacillus. CRP minimally  elevated. Lactic acidosis resolved. Ordered 1L IV fluid bolus.  Continue with IV fluids infusion and keep patient n.p.o. and conservative pain management.  F/U bcx and Urine cx.     MRCP ordered and shows Layering hypointense material within the gallbladder which is somewhat distended compatible with small layering stones or sludge. Pericholecystic fluid is noted. The common hepatic duct is mildly dilated and is approximately 7 mm in diameter. The common bile duct has a maximal diameter of approximately 8 mm. There is a small signal defect  noted in the distal common bile duct near the ampulla best seen on axial T2 sequence image #19 that is most consistent with a distal common duct stone and is just beyond the pancreatic duct origin. Mild pancreatitis with at least 2 small pseudocysts noted the largest of which is 2.0 cm. Nodular liver margins are noted compatible with cirrhosis.    Liver Cirrhosis, new diagnosis, will need further work up.    EMILIA, continue with IVF and monitor.     Mild transaminitis, monitor.    Syncope, likely vasovagal. F/E 2d echo and carotid doppler.    Hypomagnesemia, replace and resolved.    Essential HTN, BP stable. Hold home antihypertensives.     Heparin sc for the DVT prophylaxis.  Pepcid iv for the GI prophylaxis.     For gallstone pancreatitis and new cirrhosis, patient would need [facility].  I discussed with Dr. Armando Arellano, GI specialist at [facility] and patient has been accepted.  Awaiting bed availability.    Management discussed in detail with patient and nursing.  Patient and daughter agreeable to get transferred.    The patient is high risk due to the following diagnoses/reasons:  Severe Sepsis, Acute calculous cholecystitis, Acute UTI  Acute gallstone pancreatitis, EMILIA  Lactic acidosis      Olimpia Rushing MD  10/03/19  3:06 PM

## 2019-10-03 NOTE — PLAN OF CARE
Problem: Fall Risk (Adult)  Goal: Absence of Fall  Outcome: Ongoing (interventions implemented as appropriate)   10/03/19 0450   Fall Risk (Adult)   Absence of Fall making progress toward outcome       Problem: Patient Care Overview  Goal: Plan of Care Review  Outcome: Ongoing (interventions implemented as appropriate)   10/03/19 0450   Coping/Psychosocial   Plan of Care Reviewed With patient;daughter     Goal: Discharge Needs Assessment  Outcome: Ongoing (interventions implemented as appropriate)   10/03/19 0450   Discharge Needs Assessment   Readmission Within the Last 30 Days no previous admission in last 30 days   Concerns to be Addressed denies needs/concerns at this time   Patient/Family Anticipates Transition to home with family   Patient/Family Anticipated Services at Transition none   Transportation Anticipated family or friend will provide   Disability   Equipment Currently Used at Home none       Problem: Pain, Acute (Adult)  Goal: Identify Related Risk Factors and Signs and Symptoms  Outcome: Ongoing (interventions implemented as appropriate)   10/03/19 0450   Pain, Acute (Adult)   Related Risk Factors (Acute Pain) disease process;persistent pain;patient perception   Signs and Symptoms (Acute Pain) verbalization of pain descriptors     Goal: Acceptable Pain Control/Comfort Level  Outcome: Ongoing (interventions implemented as appropriate)   10/03/19 0450   Pain, Acute (Adult)   Acceptable Pain Control/Comfort Level making progress toward outcome

## 2019-10-03 NOTE — PROGRESS NOTES
Discharge Planning Assessment  [facility]     Patient Name: Janeth Mitchell  MRN: [id number]  Today's Date: 10/3/2019    Admit Date: 10/2/2019    Discharge Needs Assessment     Row Name 10/03/19 1052       Living Environment    Lives With  alone    Current Living Arrangements  home/apartment/condo    Primary Care Provided by  self;child(ronni)    Provides Primary Care For  no one, unable/limited ability to care for self    Family Caregiver if Needed  child(ronni), adult    Quality of Family Relationships  helpful;involved;supportive    Able to Return to Prior Arrangements  yes       Resource/Environmental Concerns    Transportation Concerns  car, none       Transition Planning    Patient/Family Anticipates Transition to  home    Transportation Anticipated  family or friend will provide       Discharge Needs Assessment    Concerns to be Addressed  no discharge needs identified    Equipment Currently Used at Home  walker, rolling;commode    Equipment Needed After Discharge  none        Discharge Plan     Row Name 10/03/19 1055       Plan    Plan  SS spoke with pt on this day. Pt lives at home alone, with no HH services. Pt always stays with one of her children at night. Pt does not receive HH services, and does not see a need for them at this time. Pt Pt has a walker and bedside commode. Pt does not have a POA or a living will. Pt uses [facility], and her PCP is Tom Tafoya. Pt plans to return home at discharge, with transportation provided by family. SS will follow and assist as needed.     Patient/Family in Agreement with Plan  yes          Demographic Summary     Row Name 10/03/19 1052       General Information    Admission Type  inpatient    Referral Source  nursing    Reason for Consult  discharge planning    Preferred Language  English     Used During This Interaction  no            SHALOM Real

## 2019-10-03 NOTE — H&P
[facility] Medicine Services  History & Physical    Patient Identification:  Name:  Janeth Mitchell  Age:  84 y.o.  Sex:  female  :  1935  MRN:  [id number]   Visit Number:  [id number]  Primary Care Physician:  Alexx Tafoya MD    I have seen the patient in conjunction with MIGUEL Ayala and I agree with the following statements:    Subjective     Chief complaint: Nausea and vomiting    History of presenting illness:      Janeth Mitchell is an 84 y.o. female with past medical history significant for essential hypertension and arthritis.    Mrs. Mitchell presented to [facility] emergency department on 2019 with complaints of nausea and vomiting that began the same evening.  She reports that yesterday she made soup beans for dinner and after having a cup of soup beans and a piece of bread she developed diffused abdominal pain and began to feel nauseated; she then proceeded to have multiple bouts of vomiting. She reports that she lives at home alone but she spends every night with one of her daughters, one of whom was there to help the patient throughout the episode.  At one point the patient reports she got up to walk to the bathroom with the daughter's assistance and became very weak and started to fall to her knees. She lost consciousness briefly (the daughter thinks less than a minute) but ultimately the daughter was able to help her back to her feet and to a nearby chair. Patient reports a past medical history only significant for arthritis and essential hypertension.  She reports she takes 2 blood pressure medicines daily, but denies checking her blood pressure prior to administration.  She denies any recent medication changes, recent infections, or recent use of any antibiotics. She denies any urinary symptoms or changes in urinary habits.  She denies any recent cough, fever or diarrhea.  She also denies any tobacco alcohol or drug  use.    Upon arrival to the ED, vital signs were temperature 97.9, pulse 83, respirations 20, blood pressure 90/50, oxygen saturation 95% on room air.  Arterial blood gas shows pH 7.363, PCO2 36.1, PO2 61.0, HCO3 20.5, oxygen saturation 89.8% on room air.  Troponin T <0.010, sodium 140, potassium 3.7, creatinine 1.16, BUN 17, eGFR 45, alkaline phosphatase 145, ALT 38, AST 95, C-RP 0.62, lactate 3.7, lipase greater than 3000, magnesium 1.4, WBC 14.54, hemoglobin 13.7, hematocrit 41.8. Urinalysis shows trace blood, negative nitrites, large leukocytes, WBCs too numerous to count, and 4+ bacteria.  X-ray of the chest shows no acute cardiopulmonary findings per radiology. CT of the abdomen pelvis without contrast shows findings which are consistent with acute pancreatitis per radiology. CT of the head without contrast is unremarkable. Ultrasound of the gallbladder negative per radiology read.  ---------------------------------------------------------------------------------------------------------------------   Review of Systems   Constitutional: Negative for activity change, appetite change, chills, diaphoresis, fatigue and fever.   HENT: Negative for congestion, postnasal drip, rhinorrhea, sinus pressure, sinus pain, sore throat and trouble swallowing.    Eyes: Negative for photophobia, pain, discharge, redness, itching and visual disturbance.   Respiratory: Negative for apnea, cough, chest tightness, shortness of breath and wheezing.    Cardiovascular: Negative for chest pain, palpitations and leg swelling.   Gastrointestinal: Positive for abdominal pain, nausea and vomiting. Negative for abdominal distention, constipation and diarrhea.   Endocrine: Negative for cold intolerance, heat intolerance, polydipsia, polyphagia and polyuria.   Genitourinary: Positive for pelvic pain. Negative for difficulty urinating, dysuria, flank pain, frequency, hematuria and urgency.   Musculoskeletal: Positive for arthralgias. Negative  for back pain, gait problem, myalgias, neck pain and neck stiffness.   Skin: Negative for color change, pallor, rash and wound.   Neurological: Positive for dizziness and syncope. Negative for tremors, seizures, weakness, light-headedness, numbness and headaches.   Hematological: Negative for adenopathy. Does not bruise/bleed easily.   Psychiatric/Behavioral: Negative for agitation, behavioral problems, confusion, decreased concentration, hallucinations, self-injury and sleep disturbance.      ---------------------------------------------------------------------------------------------------------------------   Past Medical History:   Diagnosis Date   • Arthritis    • Hypertension      Past Surgical History:   Procedure Laterality Date   • HYSTERECTOMY      polps off cervic   • SKIN BIOPSY       Family History   Problem Relation Age of Onset   • Diabetes Mother    • Cancer Mother    • Stroke Father      Social History     Socioeconomic History   • Marital status:      Spouse name: Not on file   • Number of children: Not on file   • Years of education: Not on file   • Highest education level: Not on file   Tobacco Use   • Smoking status: Former Smoker   Substance and Sexual Activity   • Alcohol use: No     Frequency: Never   • Drug use: No   • Sexual activity: Defer     ---------------------------------------------------------------------------------------------------------------------   Allergies:  Sulfa antibiotics  ---------------------------------------------------------------------------------------------------------------------   Home medications:    Medications below are reported home medications pulling from within the system; at this time, these medications have not been reconciled unless otherwise specified and are in the verification process for further verifcation as current home medications.  No medications prior to admission.       Hospital Scheduled Meds:    [START ON 10/4/2019] ceftriaxone 1 g  Intravenous Q24H   heparin (porcine) 5,000 Units Subcutaneous Q12H   magnesium sulfate in D5W 1g/100mL (PREMIX) 1 g Intravenous Q1H   sodium chloride 10 mL Intravenous Q12H       sodium chloride 125 mL/hr       Current listed hospital scheduled medications may not yet reflect those currently placed in orders that are signed and held awaiting patient's arrival to floor.   ---------------------------------------------------------------------------------------------------------------------     Objective     Vital Signs:  Temp:  [97.9 °F (36.6 °C)-98.3 °F (36.8 °C)] 98.3 °F (36.8 °C)  Heart Rate:  [67-89] 67  Resp:  [18-20] 18  BP: ()/(37-63) 126/56      10/02/19  2312 10/03/19  0500   Weight: 74.8 kg (165 lb) 76.2 kg (168 lb)     Body mass index is 32.81 kg/m².  ---------------------------------------------------------------------------------------------------------------------       Physical Exam   Constitutional: She is oriented to person, place, and time and well-developed, well-nourished, and in no distress. No distress.   HENT:   Head: Normocephalic and atraumatic.   Oral mucosa dry   Eyes: Conjunctivae and EOM are normal. Pupils are equal, round, and reactive to light.   Neck: Normal range of motion. Neck supple. No JVD present. No thyromegaly present.   Cardiovascular: Normal rate, regular rhythm, normal heart sounds and intact distal pulses.   No murmur heard.  Pulmonary/Chest: Effort normal and breath sounds normal. No respiratory distress. She has no wheezes. She has no rales. She exhibits no tenderness.   Abdominal: Soft. Bowel sounds are normal. She exhibits no distension. There is tenderness in the right upper quadrant, right lower quadrant, left upper quadrant and left lower quadrant.   Tenderness most pronounced in epigastric region with mild guarding   Musculoskeletal: Normal range of motion. She exhibits edema (trace edema b/l ankles). She exhibits no tenderness or deformity.   Lymphadenopathy:      She has no cervical adenopathy.   Neurological: She is alert and oriented to person, place, and time. She has normal motor skills. She is not agitated and not disoriented. She displays no tremor and normal speech.   No gross focal deficits appreciated   Skin: Skin is warm, dry and intact. She is not diaphoretic. No pallor.   Psychiatric: Mood, memory, affect and judgment normal.     ---------------------------------------------------------------------------------------------------------------------    ---------------------------------------------------------------------------------------------------------------------   Results from last 7 days   Lab Units 10/03/19  0102   CRP mg/dL 0.62*   LACTATE mmol/L 3.7*   WBC 10*3/mm3 14.54*   HEMOGLOBIN g/dL 13.7   HEMATOCRIT % 41.8   MCV fL 100.0*   MCHC g/dL 32.8   PLATELETS 10*3/mm3 171   INR  1.14*     Results from last 7 days   Lab Units 10/02/19  2321   PH, ARTERIAL pH units 7.363   PO2 ART mm Hg 61.0*   PCO2, ARTERIAL mm Hg 36.1   HCO3 ART mmol/L 20.5     Results from last 7 days   Lab Units 10/03/19  0102   SODIUM mmol/L 140   POTASSIUM mmol/L 3.7   MAGNESIUM mg/dL 1.4*   CHLORIDE mmol/L 104   CO2 mmol/L 21.6*   BUN mg/dL 17   CREATININE mg/dL 1.16*   EGFR IF NONAFRICN AM mL/min/1.73 45*   CALCIUM mg/dL 9.4   GLUCOSE mg/dL 106*   ALBUMIN g/dL 2.83*   BILIRUBIN mg/dL 1.1   ALK PHOS U/L 145*   AST (SGOT) U/L 95*   ALT (SGPT) U/L 38*   Estimated Creatinine Clearance: 34.1 mL/min (A) (by C-G formula based on SCr of 1.16 mg/dL (H)).  No results found for: AMMONIA  Results from last 7 days   Lab Units 10/03/19  0102   TROPONIN T ng/mL <0.010         No results found for: HGBA1C  No results found for: TSH, FREET4  No results found for: PREGTESTUR, PREGSERUM, HCG, HCGQUANT  Pain Management Panel     There is no flowsheet data to display.             ---------------------------------------------------------------------------------------------------------------------  Imaging Results (last 7 days)     Procedure Component Value Units Date/Time    US Gallbladder [[id number]] Collected:  10/03/19 0055     Updated:  10/03/19 0057    Narrative:       US Abdomen Ltd    INDICATION:   Right upper quadrant pain and tenderness and vomiting starting about a 30 tonight    COMPARISON:   None available.    FINDINGS:  This ultrasound limited to the gallbladder. The gallbladder is not distended. The wall is not thickened. No stones are identified. The common bile duct is nondilated.      Impression:       Negative gallbladder ultrasound    Signer Name: Ricki Perera MD   Signed: 10/3/2019 12:55 AM   Workstation Name: [email or web address]    [facility]    CT Abdomen Pelvis Without Contrast [[id number]] Collected:  10/03/19 0033     Updated:  10/03/19 0035    Narrative:       CT Abdomen Pelvis WO    INDICATION:   Upper abdominal pain tonight    TECHNIQUE:   CT of the abdomen and pelvis without IV contrast. Coronal and sagittal reconstructions were obtained.  Radiation dose reduction techniques included automated exposure control or exposure modulation based on body size. Count of known CT and cardiac nuc  med studies performed in previous 12 months: 0.     COMPARISON:   None available.    FINDINGS:  Abdomen: Lung bases are clear. The peripheral border the liver is nodular suggesting cirrhosis. No focal masses are identified. The gallbladder is distended and contains multiple small dependent stones. Spleen, adrenal glands and kidneys are normal.  There is inflammatory change within the fat around the pancreas. Brain parenchyma is otherwise normal. The aorta is normal in size. There is no adenopathy. Bowel is normal except for sigmoid diverticuli.    Pelvis: Uterus and adnexal regions and bladder are normal. Bones are unremarkable.      Impression:       Findings are  consistent with acute pancreatitis.          Signer Name: Ricki Perera MD   Signed: 10/3/2019 12:33 AM   Workstation Name: [facility]    CT Head Without Contrast [[id number]] Collected:  10/03/19 0027     Updated:  10/03/19 0029    Narrative:       CT Head WO    HISTORY:   Confusion delirium and altered level consciousness tonight    TECHNIQUE:   Axial unenhanced head CT. Radiation dose reduction techniques included automated exposure control or exposure modulation based on body size. Count of known CT and cardiac nuc med studies performed in previous 12 months: 0.     Time of scan:    COMPARISON:   None.    FINDINGS:   No intracranial hemorrhage, mass, or infarct. No hydrocephalus or extra-axial fluid collection. Brain parenchymal density is normal. The skull base, calvarium, and extracranial soft tissues are normal.      Impression:       Normal, negative unenhanced head CT.          Signer Name: Ricki Perera MD   Signed: 10/3/2019 12:27 AM   Workstation Name: [facility]    XR Chest 1 View [[id number]] Collected:  10/03/19 0007     Updated:  10/03/19 0010    Narrative:       CR Chest 1 Vw    INDICATION:   Nausea vomiting, sepsis protocol     COMPARISON:    None available.    FINDINGS:  Single portable AP view(s) of the chest.  The heart and mediastinal contours are normal. The lungs are clear. No pneumothorax or pleural effusion.      Impression:       No acute cardiopulmonary findings.    Signer Name: Ricki Perera MD   Signed: 10/3/2019 12:07 AM   Workstation Name: [facility]          Cultures:   Blood and urine cultures pending.     I have personally reviewed the radiology images and read the final radiology report.  ---------------------------------------------------------------------------------------------------------------------  Assessment / Plan     Active Hospital Problems    Diagnosis POA   •  Acute pancreatitis [K85.90] Yes       ASSESSMENT/PLAN:    · Acute pancreatitis with nausea and vomiting: Lipase >3000.  Continuous IV fluids ordered.  Patient to remain n.p.o. IV morphine has been ordered PRN for pain.  Will monitor vital signs per protocol.  Zofran ordered PRN. Supplemental oxygen with orders to titrate for saturations greater than 95% has been ordered.  We will continue to monitor with a.m. amylase and lipase. CT and GB US with contradicting findings--CT showed GB distention and multiple gallstones raising concern for gallstone pancreatitis, while subsequent GB US showed normal GB with no stones. Reading radiologist called by ED to clarify his findings, and he stated there were definitely no stones, GB wall thickening or distention based on US so CT was probably over-read.     · UTI: Urinalysis showed trace ketones, trace blood, large leukocytes, negative nitrites, WBC too numerous to count, and 4+ bacteria.  Urine culture pending.  Treat with IV rocephin. Monitor vital signs per protocol.  Weights and strict I's and O's ordered. She does have leukocytosis but does not meet any other SIRS criteria. Lactic acid is elevated but likely due to excessive nausea/vomiting. Reflex lactic still pending.     · Syncopal episode: Patient and daughter report single episode of patient becoming weak and falling to her knees on the evening of 10/2/2019 (this was after multiple bouts of vomiting).  The daughter reports patient lost consciousness for less than 1 minute.  CT of the head without contrast is unremarkable per radiology read.  Continuous cardiac monitoring ordered.  Orthostatic blood pressure ordered.  Fall precautions ordered.  Carotid ultrasound and ECHO ordered for further workup, though suspect she had vasovagal reaction from excessive vomiting that led to syncopal episode.     · Hypomagnesemia: Magnesium on admission 1.4.  Magnesium replacement protocol ordered.  We will continue to  monitor.    · Acute kidney injury vs ? CKD: Creatinine 1.16 with baseline unknown.  Patient denies any history of kidney disease.  EMILIA likely related to vomiting in the setting of pancreatitis.  IV fluids ordered.  We will continue to monitor fluid status with daily weights and strict I's and O's.  AM CMP ordered.    · Essential hypertension: Blood pressure stable to low during ED stay, getting as low as 90/50.  We will continue to monitor vital signs per protocol. Plan to hold home BP meds for now; one such medication is enalapril. ACE inhibitors can cause pancreatitis though this is rare.     ----------  -DVT prophylaxis: SQ heparin  -Diet: NPO  -Activity: Up with assistance  -Expected length of stay: INPATIENT status due to the need for care which can only be reasonably provided in an hospital setting such as aggressive/expedited ancillary services and/or consultation services, the necessity for IV medications, close physician monitoring and/or the possible need for procedures.  In such, I feel patient´s risk for adverse outcomes and need for care warrant INPATIENT evaluation and predict the patient´s care encounter to likely last beyond 2 midnights.    Plan of care discussed with patient, her daughter at bedside, and her RN Kyra who was present during my interview and exam in the PCU.     * Patient is high risk due to acute pancreatitis, UTI, lactic acidosis, renal insufficiency with unknown baseline, advanced age    Cyril Kaur MD  10/03/19  5:26 AM   -----------------------------------------------------------------------------  * I have seen the patient in conjunction with MIGUEL Ayala, and have amended her note to reflect my own findings, assessment and plan.

## 2019-10-03 NOTE — PLAN OF CARE
Problem: Fall Risk (Adult)  Goal: Identify Related Risk Factors and Signs and Symptoms  Outcome: Ongoing (interventions implemented as appropriate)   10/03/19 0929   Fall Risk (Adult)   Related Risk Factors (Fall Risk) age-related changes;fatigue/slow reaction;environment unfamiliar   Signs and Symptoms (Fall Risk) presence of risk factors     Goal: Absence of Fall  Outcome: Ongoing (interventions implemented as appropriate)   10/03/19 0929   Fall Risk (Adult)   Absence of Fall making progress toward outcome       Problem: Patient Care Overview  Goal: Plan of Care Review  Outcome: Ongoing (interventions implemented as appropriate)   10/03/19 0929   Coping/Psychosocial   Plan of Care Reviewed With patient   Plan of Care Review   Progress improving       Problem: Pain, Acute (Adult)  Goal: Identify Related Risk Factors and Signs and Symptoms  Outcome: Ongoing (interventions implemented as appropriate)   10/03/19 0929   Pain, Acute (Adult)   Related Risk Factors (Acute Pain) disease process   Signs and Symptoms (Acute Pain) fatigue/weakness     Goal: Acceptable Pain Control/Comfort Level  Outcome: Ongoing (interventions implemented as appropriate)   10/03/19 0929   Pain, Acute (Adult)   Acceptable Pain Control/Comfort Level making progress toward outcome

## 2019-10-03 NOTE — ED PROVIDER NOTES
Subjective   84-year-old female brought in the emergency department by her family after having nausea, vomiting, and abdominal pain started earlier this evening.  Patient vomited multiple times to the point she became extremely weak and had a near syncopal episode.  Denies any trauma to her head and/or falling to the ground.  Has no significant past medical history other than hypertension.  Denies fever, chills, and/or diarrhea.  She is unable to tolerate any p.o. intake at this time.        History provided by:  Patient and relative   used: No    Abdominal Pain   Pain location:  Epigastric  Pain quality: aching, bloating, cramping and throbbing    Pain radiates to:  Does not radiate  Pain severity:  Moderate  Onset quality:  Gradual  Timing:  Constant  Progression:  Worsening  Chronicity:  New  Context: retching    Context: not alcohol use, not awakening from sleep, not diet changes, not eating, not laxative use, not medication withdrawal, not previous surgeries, not recent illness, not recent sexual activity, not recent travel, not sick contacts, not suspicious food intake and not trauma    Relieved by:  Nothing  Worsened by:  Nothing  Ineffective treatments:  None tried  Associated symptoms: nausea and vomiting    Associated symptoms: no anorexia, no belching, no chest pain, no chills, no constipation, no cough, no diarrhea, no dysuria, no fatigue, no fever, no flatus, no hematemesis, no hematochezia, no hematuria, no melena, no shortness of breath, no sore throat, no vaginal bleeding and no vaginal discharge    Nausea:     Severity:  Moderate    Onset quality:  Sudden    Timing:  Constant    Progression:  Worsening  Vomiting:     Quality:  Bilious material    Number of occurrences:  >10    Severity:  Moderate    Timing:  Constant    Progression:  Improving  Risk factors: being elderly    Risk factors: no alcohol abuse, no aspirin use, has not had multiple surgeries, no NSAID use, not obese,  not pregnant and no recent hospitalization        Review of Systems   Constitutional: Negative for chills, fatigue and fever.   HENT: Negative for sore throat.    Respiratory: Negative for cough and shortness of breath.    Cardiovascular: Negative for chest pain.   Gastrointestinal: Positive for abdominal pain, nausea and vomiting. Negative for anorexia, constipation, diarrhea, flatus, hematemesis, hematochezia and melena.   Genitourinary: Negative for dysuria, hematuria, vaginal bleeding and vaginal discharge.   All other systems reviewed and are negative.      No past medical history on file.    Allergies   Allergen Reactions   • Sulfa Antibiotics Unknown (See Comments)     States she cant remember       No past surgical history on file.    No family history on file.    Social History     Socioeconomic History   • Marital status:      Spouse name: Not on file   • Number of children: Not on file   • Years of education: Not on file   • Highest education level: Not on file           Objective   Physical Exam   Constitutional: She is oriented to person, place, and time. She appears well-developed and well-nourished.  Non-toxic appearance. No distress.   HENT:   Head: Normocephalic and atraumatic.   Right Ear: External ear normal.   Left Ear: External ear normal.   Nose: Nose normal.   Mouth/Throat: Oropharynx is clear and moist and mucous membranes are normal. No oropharyngeal exudate. No tonsillar exudate.   Eyes: Conjunctivae, EOM and lids are normal. Pupils are equal, round, and reactive to light.   Neck: Normal range of motion and full passive range of motion without pain. Neck supple. No thyromegaly present.   Cardiovascular: Normal rate, regular rhythm, S1 normal, S2 normal, normal heart sounds, intact distal pulses and normal pulses.   Pulmonary/Chest: Effort normal and breath sounds normal. No tachypnea. No respiratory distress. She has no decreased breath sounds. She has no wheezes. She has no rales.  She exhibits no tenderness.   Abdominal: Soft. Normal appearance and bowel sounds are normal. She exhibits no distension. There is tenderness. There is no rebound and no guarding.   Musculoskeletal: Normal range of motion. She exhibits no edema, tenderness or deformity.   Lymphadenopathy:     She has no cervical adenopathy.   Neurological: She is alert and oriented to person, place, and time. She has normal strength. No cranial nerve deficit or sensory deficit. GCS eye subscore is 4. GCS verbal subscore is 5. GCS motor subscore is 6.   Skin: Skin is warm, dry and intact. Capillary refill takes less than 2 seconds. No rash noted. She is not diaphoretic. No erythema. No pallor.   Psychiatric: She has a normal mood and affect. Her speech is normal and behavior is normal. Judgment and thought content normal. Cognition and memory are normal.   Nursing note and vitals reviewed.      Procedures           ED Course  ED Course as of Oct 03 0358   Thu Oct 03, 2019   0135 Impression    No acute cardiopulmonary findings.     XR Chest 1 View [ES]   0135 Impression    Normal, negative unenhanced head CT.     CT Head Without Contrast [ES]   0135 Impression    Findings are consistent with acute pancreatitis.     CT Abdomen Pelvis Without Contrast [ES]   0136 Impression    Negative gallbladder ultrasound     US Gallbladder [ES]   0357 Normal sinus rhythm  Normal ECG  No previous ECGs available  Vent. rate 85 BPM  KY interval 152 ms  QRS duration 82 ms  QT/QTc 390/464 ms  P-R-T axes 75 12 43 ECG 12 Lead [ES]   0358 Discussed case with on call Hospitalist Dr. Kaur for further evaluation and treatment.  [ES]      ED Course User Index  [ES] Real Mandujano MD                  [facility]  Number of Diagnoses or Management Options     Amount and/or Complexity of Data Reviewed  Clinical lab tests: reviewed and ordered  Tests in the radiology section of CPT®: reviewed and ordered  Tests in the medicine section of CPT®: ordered and  reviewed  Discussion of test results with the performing providers: yes  Review and summarize past medical records: yes  Discuss the patient with other providers: yes  Independent visualization of images, tracings, or specimens: yes    Risk of Complications, Morbidity, and/or Mortality  Presenting problems: moderate  Diagnostic procedures: moderate  Management options: moderate    Patient Progress  Patient progress: stable      Final diagnoses:   Idiopathic acute pancreatitis without infection or necrosis   Sepsis secondary to UTI (CMS/HCC)   Dehydration   Nausea and vomiting in adult patient              Real Mandujano MD  10/03/19 0354

## 2019-10-03 NOTE — NURSING NOTE
ACC REVIEW REPORT: [facility]        PATIENT NAME: Janeth Mitchell    PATIENT ID: [id number]    BED: S552    BED TYPE: tele    BED GIVEN TO: Ruba Klein RN    TIME BED GIVEN: 1943    YOB: 1935    AGE: 84    GENDER: female    PREVIOUS ADMIT TO [facility]:     PREVIOUS ADMISSION DATE:     PATIENT CLASS: inpatient    TODAY'S DATE: 10/3/2019    TRANSFER DATE: 10/3/19    ETA: 2200    TRANSFERRING FACILITY: [facility] (PCU)    TRANSFERRING FACILITY PHONE # : [phone]    TRANSFERRING MD: Dr. Rushing    DATE/TIME REQUEST RECEIVED: 10/3 @ 9536    [facility] RN: Clarisa Guaman RN    REPORT FROM: Ruba Klein RN    TIME REPORT TAKEN: 1935    DIAGNOSIS: pancreatitis/gallstones/cirrhosis     REASON FOR TRANSFER TO [facility]: higher level of care    TRANSPORTATION: local ground    CLINICAL REASON FOR TRANSFER TO [facility]: higher level of care      CLINICAL INFORMATION    HEIGHT: 60 inches    WEIGHT: 168 lbs    ALLERGIES: sulfa    BURDEN: no    INFECTIOUS DISEASE: no    ISOLATION: no    LAST VITAL SIGNS:  TIME: 1802  TEMP: 98.4  PULSE: 66  B/P: 137/53  RESP: 20    LAB INFORMATION: BUN/Cr 19/1.2, GFR 43, Albumin 2.67, ALT 41, AST 93, Alk Phos 137, Amylase 789, Lipase >3000, WBC 14.54, CRP 0.62  Mg @ 0602 - 1.5  Mg @ 1418 - 2.5  Lactate @ 0102 - 3.7  Lactate @ 0602 - 2.9  Lactate @ 1224 - 1.2    CULTURE INFORMATION: Urine culture + gram negative bacilli, blood cultures x2 - pending    MEDS/IV FLUIDS: #20 left AC - NS @ 125 ml/hr  1809 Flagyl 500 mg IV  1511 Morphine 1 mg IV  (see transfer MAR upon arrival for additional meds and times)      CARDIAC SYSTEM:    CHEST PAIN: no    RATE:     SCALE:     RHYTHM: NSR    Is patient taking or has patient been given any drugs that could increase bleeding? yes  (Plavix, Brilinta, Effient, Eliquis, Xarelto, Warfarin, Integrilin, Angiomax)    DRUG: Heparin     DOSE/FREQUENCY: SQ    CARDIAC NOTES:   Carotid Ultrasound -  negative      RESPIRATORY SYSTEM:    LUNG SOUNDS:    CLEAR: y  CRACKLES: n  WHEEZES: n  RHONCHI: n  DIMINISHED: n    ABG DATE: 10/2/19        ABG TIME: 2321    ABG RESULTS:    PH: 7.363  PO2: 61.0  PCO2: 36.1  HCO3: 20.5  O2 SAT: 89.8%    OXYGEN: no    O2 SAT: 97%    ADMINISTRATION ROUTE: room air    IMAGING FINDINGS: CXR - negative    RESPIRATORY STATUS: stable      CNS/MUSCULOSKELETAL    ALERT AND ORIENTED:    PERSON: y  PLACE: y  TIME: y    ABBEY COMA SCALE:    E: 3  M: 6  V: 5    CAT SCAN RESULTS: CT head - negative    MRI RESULTS: n/a    CNS/MUSCULOSKELETAL NOTES: patient was independent in mobility and ADL's prior to hospitalization  XR ankle 3 views - tissue swelling, no fracture      GI//GY      ABDOMINAL PAIN: n    VOMITING: n    DIARRHEA: n    NAUSEA: y    BOWEL SOUNDS: +      ULTRASOUND:   US Gallbladder - negative    CT SCAN:   CT Abdomen - acute pancreatitis    MRI SCAN:   MRI Abdomen - see below  Impression:        1. Small signal filling defect in the distal common bile duct at the  level of the ampulla most consistent with a small 4 mm common duct  stone.  2. Borderline dilated common bile duct and common hepatic duct as  detailed above with distention of the gallbladder.  3.Mild pancreatitis with at least 2 small pseudocysts noted the largest  of which is 2.0 cm.  4. Layering sludge and/or small stones within the gallbladder.  5. Liver cirrhosis which may account for patient's anasarca and mild  ascites.  6. Cardiomegaly and small pleural effusions.         GI//GY NOTES: Last BM was 10/2, Patient is currently NPO    PAST MEDICAL HISTORY: Arthritis, HTN, Hysterectomy, polyps off cervix skin biopsy    OTHER SYMPTOM NOTES: Chief complaint: Nausea and vomiting     History of presenting illness:       Janeth Mitchell is an 84 y.o. female with past medical history significant for essential hypertension and arthritis.     Mrs. Mitchell presented to [facility] emergency department on  October 2, 2019 with complaints of nausea and vomiting that began the same evening.  She reports that yesterday she made soup beans for dinner and after having a cup of soup beans and a piece of bread she developed diffused abdominal pain and began to feel nauseated; she then proceeded to have multiple bouts of vomiting. She reports that she lives at home alone but she spends every night with one of her daughters, one of whom was there to help the patient throughout the episode.  At one point the patient reports she got up to walk to the bathroom with the daughter's assistance and became very weak and started to fall to her knees. She lost consciousness briefly (the daughter thinks less than a minute) but ultimately the daughter was able to help her back to her feet and to a nearby chair. Patient reports a past medical history only significant for arthritis and essential hypertension.  She reports she takes 2 blood pressure medicines daily, but denies checking her blood pressure prior to administration.  She denies any recent medication changes, recent infections, or recent use of any antibiotics. She denies any urinary symptoms or changes in urinary habits.  She denies any recent cough, fever or diarrhea.  She also denies any tobacco alcohol or drug use.     Upon arrival to the ED, vital signs were temperature 97.9, pulse 83, respirations 20, blood pressure 90/50, oxygen saturation 95% on room air.  Arterial blood gas shows pH 7.363, PCO2 36.1, PO2 61.0, HCO3 20.5, oxygen saturation 89.8% on room air.  Troponin T <0.010, sodium 140, potassium 3.7, creatinine 1.16, BUN 17, eGFR 45, alkaline phosphatase 145, ALT 38, AST 95, C-RP 0.62, lactate 3.7, lipase greater than 3000, magnesium 1.4, WBC 14.54, hemoglobin 13.7, hematocrit 41.8. Urinalysis shows trace blood, negative nitrites, large leukocytes, WBCs too numerous to count, and 4+ bacteria.  X-ray of the chest shows no acute cardiopulmonary findings per radiology.  CT of the abdomen pelvis without contrast shows findings which are consistent with acute pancreatitis per radiology. CT of the head without contrast is unremarkable. Ultrasound of the gallbladder negative per radiology read.    ADDITIONAL NOTES: 84-year-old female brought in the emergency department by her family on 10/2 @ 2309 after having nausea, vomiting, and abdominal pain started earlier this evening.  Patient vomited multiple times to the point she became extremely weak and had a near syncopal episode.  Denies any trauma to her head and/or falling to the ground.  Has no significant past medical history other than hypertension.  Denies fever, chills, and/or diarrhea.  She is unable to tolerate any p.o. intake at this time.          Cady Guaman RN  10/3/2019  7:33 PM

## 2019-10-04 ENCOUNTER — ANESTHESIA (OUTPATIENT)
Dept: GASTROENTEROLOGY | Facility: HOSPITAL | Age: 84
End: 2019-10-04

## 2019-10-04 ENCOUNTER — APPOINTMENT (OUTPATIENT)
Dept: GENERAL RADIOLOGY | Facility: HOSPITAL | Age: 84
End: 2019-10-04

## 2019-10-04 ENCOUNTER — HOSPITAL ENCOUNTER (INPATIENT)
Facility: HOSPITAL | Age: 84
LOS: 2 days | Discharge: HOME | End: 2019-10-06
Attending: FAMILY MEDICINE | Admitting: INTERNAL MEDICINE

## 2019-10-04 ENCOUNTER — ANESTHESIA EVENT (OUTPATIENT)
Dept: GASTROENTEROLOGY | Facility: HOSPITAL | Age: 84
End: 2019-10-04

## 2019-10-04 PROBLEM — R55 SYNCOPE: Status: ACTIVE | Noted: 2019-10-04

## 2019-10-04 PROBLEM — M19.90 ARTHRITIS: Status: ACTIVE | Noted: 2019-10-04

## 2019-10-04 PROBLEM — I10 ESSENTIAL HYPERTENSION: Status: ACTIVE | Noted: 2019-10-04

## 2019-10-04 PROBLEM — N39.0 UTI (URINARY TRACT INFECTION), BACTERIAL: Status: ACTIVE | Noted: 2019-10-04

## 2019-10-04 PROBLEM — K74.60 CIRRHOSIS OF LIVER (HCC): Status: ACTIVE | Noted: 2019-10-04

## 2019-10-04 PROBLEM — A49.9 UTI (URINARY TRACT INFECTION), BACTERIAL: Status: ACTIVE | Noted: 2019-10-04

## 2019-10-04 PROBLEM — K86.3 PSEUDOCYST OF PANCREAS: Status: ACTIVE | Noted: 2019-10-04

## 2019-10-04 PROBLEM — K85.10 GALLSTONE PANCREATITIS: Status: ACTIVE | Noted: 2019-10-04

## 2019-10-04 LAB
ABO GROUP BLD: NORMAL
ABO GROUP BLD: NORMAL
ALBUMIN SERPL-MCNC: 2.9 G/DL (ref 3.5–5.2)
ALBUMIN/GLOB SERPL: 0.9 G/DL
ALP SERPL-CCNC: 127 U/L (ref 39–117)
ALPHA-FETOPROTEIN: 4.77 NG/ML (ref 0–8.3)
ALT SERPL W P-5'-P-CCNC: 38 U/L (ref 1–33)
ANION GAP SERPL CALCULATED.3IONS-SCNC: 12 MMOL/L (ref 5–15)
AST SERPL-CCNC: 79 U/L (ref 1–32)
BACTERIA SPEC AEROBE CULT: ABNORMAL
BACTERIA UR QL AUTO: ABNORMAL /HPF
BASOPHILS # BLD AUTO: 0.04 10*3/MM3 (ref 0–0.2)
BASOPHILS NFR BLD AUTO: 0.6 % (ref 0–1.5)
BILIRUB SERPL-MCNC: 1.2 MG/DL (ref 0.2–1.2)
BILIRUB UR QL STRIP: NEGATIVE
BLD GP AB SCN SERPL QL: NEGATIVE
BUN BLD-MCNC: 17 MG/DL (ref 8–23)
BUN/CREAT SERPL: 20.5 (ref 7–25)
CALCIUM SPEC-SCNC: 9.1 MG/DL (ref 8.6–10.5)
CHLORIDE SERPL-SCNC: 108 MMOL/L (ref 98–107)
CLARITY UR: CLEAR
CO2 SERPL-SCNC: 21 MMOL/L (ref 22–29)
COLOR UR: YELLOW
CREAT BLD-MCNC: 0.83 MG/DL (ref 0.57–1)
D-LACTATE SERPL-SCNC: 2.1 MMOL/L (ref 0.5–2)
DEPRECATED RDW RBC AUTO: 61.4 FL (ref 37–54)
EOSINOPHIL # BLD AUTO: 0.03 10*3/MM3 (ref 0–0.4)
EOSINOPHIL NFR BLD AUTO: 0.5 % (ref 0.3–6.2)
ERYTHROCYTE [DISTWIDTH] IN BLOOD BY AUTOMATED COUNT: 16 % (ref 12.3–15.4)
GFR SERPL CREATININE-BSD FRML MDRD: 66 ML/MIN/1.73
GGT SERPL-CCNC: 107 U/L (ref 5–36)
GLOBULIN UR ELPH-MCNC: 3.4 GM/DL
GLUCOSE BLD-MCNC: 79 MG/DL (ref 65–99)
GLUCOSE BLDC GLUCOMTR-MCNC: 88 MG/DL (ref 70–130)
GLUCOSE UR STRIP-MCNC: NEGATIVE MG/DL
HAV IGM SERPL QL IA: NORMAL
HBV CORE IGM SERPL QL IA: NORMAL
HBV SURFACE AB SER RIA-ACNC: REACTIVE
HBV SURFACE AG SERPL QL IA: NORMAL
HCT VFR BLD AUTO: 39 % (ref 34–46.6)
HCV AB SER DONR QL: NORMAL
HGB BLD-MCNC: 12.6 G/DL (ref 12–15.9)
HGB UR QL STRIP.AUTO: NEGATIVE
HOLD SPECIMEN: NORMAL
HYALINE CASTS UR QL AUTO: ABNORMAL /LPF
IMM GRANULOCYTES # BLD AUTO: 0.01 10*3/MM3 (ref 0–0.05)
IMM GRANULOCYTES NFR BLD AUTO: 0.2 % (ref 0–0.5)
INR PPP: 1.28 (ref 0.85–1.16)
KETONES UR QL STRIP: NEGATIVE
LEUKOCYTE ESTERASE UR QL STRIP.AUTO: ABNORMAL
LIPASE SERPL-CCNC: 166 U/L (ref 13–60)
LYMPHOCYTES # BLD AUTO: 1.6 10*3/MM3 (ref 0.7–3.1)
LYMPHOCYTES NFR BLD AUTO: 25.1 % (ref 19.6–45.3)
MAGNESIUM SERPL-MCNC: 2 MG/DL (ref 1.6–2.4)
MCH RBC QN AUTO: 33.2 PG (ref 26.6–33)
MCHC RBC AUTO-ENTMCNC: 32.3 G/DL (ref 31.5–35.7)
MCV RBC AUTO: 102.9 FL (ref 79–97)
MONOCYTES # BLD AUTO: 0.75 10*3/MM3 (ref 0.1–0.9)
MONOCYTES NFR BLD AUTO: 11.8 % (ref 5–12)
NEUTROPHILS # BLD AUTO: 3.95 10*3/MM3 (ref 1.7–7)
NEUTROPHILS NFR BLD AUTO: 61.8 % (ref 42.7–76)
NITRITE UR QL STRIP: NEGATIVE
NRBC BLD AUTO-RTO: 0 /100 WBC (ref 0–0.2)
PH UR STRIP.AUTO: <=5 [PH] (ref 5–8)
PLATELET # BLD AUTO: 155 10*3/MM3 (ref 140–450)
PMV BLD AUTO: 10.9 FL (ref 6–12)
POTASSIUM BLD-SCNC: 4 MMOL/L (ref 3.5–5.2)
PROT SERPL-MCNC: 6.3 G/DL (ref 6–8.5)
PROT UR QL STRIP: NEGATIVE
PROTHROMBIN TIME: 15.4 SECONDS (ref 11.2–14.3)
RBC # BLD AUTO: 3.79 10*6/MM3 (ref 3.77–5.28)
RBC # UR: ABNORMAL /HPF
REF LAB TEST METHOD: ABNORMAL
RH BLD: POSITIVE
RH BLD: POSITIVE
SODIUM BLD-SCNC: 141 MMOL/L (ref 136–145)
SP GR UR STRIP: 1.02 (ref 1–1.03)
SQUAMOUS #/AREA URNS HPF: ABNORMAL /HPF
T&S EXPIRATION DATE: NORMAL
UROBILINOGEN UR QL STRIP: ABNORMAL
WBC NRBC COR # BLD: 6.38 10*3/MM3 (ref 3.4–10.8)
WBC UR QL AUTO: ABNORMAL /HPF

## 2019-10-04 PROCEDURE — 25010000002 DEXAMETHASONE PER 1 MG: Performed by: NURSE ANESTHETIST, CERTIFIED REGISTERED

## 2019-10-04 PROCEDURE — 83605 ASSAY OF LACTIC ACID: CPT | Performed by: NURSE PRACTITIONER

## 2019-10-04 PROCEDURE — 86900 BLOOD TYPING SEROLOGIC ABO: CPT | Performed by: NURSE PRACTITIONER

## 2019-10-04 PROCEDURE — 25010000002 PIPERACILLIN SOD-TAZOBACTAM PER 1 G: Performed by: NURSE PRACTITIONER

## 2019-10-04 PROCEDURE — 82977 ASSAY OF GGT: CPT | Performed by: NURSE PRACTITIONER

## 2019-10-04 PROCEDURE — 80074 ACUTE HEPATITIS PANEL: CPT | Performed by: NURSE PRACTITIONER

## 2019-10-04 PROCEDURE — 83690 ASSAY OF LIPASE: CPT | Performed by: NURSE PRACTITIONER

## 2019-10-04 PROCEDURE — 71045 X-RAY EXAM CHEST 1 VIEW: CPT

## 2019-10-04 PROCEDURE — 87086 URINE CULTURE/COLONY COUNT: CPT | Performed by: NURSE PRACTITIONER

## 2019-10-04 PROCEDURE — 82962 GLUCOSE BLOOD TEST: CPT

## 2019-10-04 PROCEDURE — 81001 URINALYSIS AUTO W/SCOPE: CPT | Performed by: NURSE PRACTITIONER

## 2019-10-04 PROCEDURE — C2625 STENT, NON-COR, TEM W/DEL SY: HCPCS | Performed by: INTERNAL MEDICINE

## 2019-10-04 PROCEDURE — 85025 COMPLETE CBC W/AUTO DIFF WBC: CPT | Performed by: NURSE PRACTITIONER

## 2019-10-04 PROCEDURE — 86901 BLOOD TYPING SEROLOGIC RH(D): CPT

## 2019-10-04 PROCEDURE — 83735 ASSAY OF MAGNESIUM: CPT | Performed by: NURSE PRACTITIONER

## 2019-10-04 PROCEDURE — 99222 1ST HOSP IP/OBS MODERATE 55: CPT | Performed by: PHYSICIAN ASSISTANT

## 2019-10-04 PROCEDURE — 85610 PROTHROMBIN TIME: CPT | Performed by: NURSE PRACTITIONER

## 2019-10-04 PROCEDURE — C1894 INTRO/SHEATH, NON-LASER: HCPCS

## 2019-10-04 PROCEDURE — 86900 BLOOD TYPING SEROLOGIC ABO: CPT

## 2019-10-04 PROCEDURE — 25010000002 PROPOFOL 10 MG/ML EMULSION: Performed by: NURSE ANESTHETIST, CERTIFIED REGISTERED

## 2019-10-04 PROCEDURE — 82105 ALPHA-FETOPROTEIN SERUM: CPT | Performed by: PHYSICIAN ASSISTANT

## 2019-10-04 PROCEDURE — 80053 COMPREHEN METABOLIC PANEL: CPT | Performed by: NURSE PRACTITIONER

## 2019-10-04 PROCEDURE — C1751 CATH, INF, PER/CENT/MIDLINE: HCPCS

## 2019-10-04 PROCEDURE — 86706 HEP B SURFACE ANTIBODY: CPT | Performed by: PHYSICIAN ASSISTANT

## 2019-10-04 PROCEDURE — 74330 X-RAY BILE/PANC ENDOSCOPY: CPT

## 2019-10-04 PROCEDURE — C1769 GUIDE WIRE: HCPCS | Performed by: INTERNAL MEDICINE

## 2019-10-04 PROCEDURE — 25010000002 ONDANSETRON PER 1 MG: Performed by: NURSE PRACTITIONER

## 2019-10-04 PROCEDURE — 86850 RBC ANTIBODY SCREEN: CPT | Performed by: NURSE PRACTITIONER

## 2019-10-04 PROCEDURE — 86901 BLOOD TYPING SEROLOGIC RH(D): CPT | Performed by: NURSE PRACTITIONER

## 2019-10-04 PROCEDURE — 87040 BLOOD CULTURE FOR BACTERIA: CPT | Performed by: NURSE PRACTITIONER

## 2019-10-04 PROCEDURE — 25010000002 MORPHINE PER 10 MG: Performed by: NURSE PRACTITIONER

## 2019-10-04 PROCEDURE — 25010000002 FENTANYL CITRATE (PF) 100 MCG/2ML SOLUTION: Performed by: NURSE ANESTHETIST, CERTIFIED REGISTERED

## 2019-10-04 PROCEDURE — 0F7D8DZ DILATION OF PANCREATIC DUCT WITH INTRALUMINAL DEVICE, VIA NATURAL OR ARTIFICIAL OPENING ENDOSCOPIC: ICD-10-PCS | Performed by: INTERNAL MEDICINE

## 2019-10-04 PROCEDURE — 99223 1ST HOSP IP/OBS HIGH 75: CPT | Performed by: INTERNAL MEDICINE

## 2019-10-04 DEVICE — PANCREATIC STENT KIT
Type: IMPLANTABLE DEVICE | Site: PANCREAS | Status: FUNCTIONAL
Brand: ADVANIX™ PANCREATIC STENT KIT

## 2019-10-04 RX ORDER — NALOXONE HCL 0.4 MG/ML
0.4 VIAL (ML) INJECTION
Status: DISCONTINUED | OUTPATIENT
Start: 2019-10-04 | End: 2019-10-06 | Stop reason: HOSPADM

## 2019-10-04 RX ORDER — LIDOCAINE HYDROCHLORIDE 10 MG/ML
INJECTION, SOLUTION EPIDURAL; INFILTRATION; INTRACAUDAL; PERINEURAL AS NEEDED
Status: DISCONTINUED | OUTPATIENT
Start: 2019-10-04 | End: 2019-10-04 | Stop reason: SURG

## 2019-10-04 RX ORDER — SODIUM CHLORIDE, SODIUM LACTATE, POTASSIUM CHLORIDE, CALCIUM CHLORIDE 600; 310; 30; 20 MG/100ML; MG/100ML; MG/100ML; MG/100ML
INJECTION, SOLUTION INTRAVENOUS CONTINUOUS PRN
Status: DISCONTINUED | OUTPATIENT
Start: 2019-10-04 | End: 2019-10-04 | Stop reason: SURG

## 2019-10-04 RX ORDER — FENTANYL CITRATE 50 UG/ML
50 INJECTION, SOLUTION INTRAMUSCULAR; INTRAVENOUS
Status: DISCONTINUED | OUTPATIENT
Start: 2019-10-04 | End: 2019-10-04 | Stop reason: HOSPADM

## 2019-10-04 RX ORDER — SODIUM CHLORIDE 9 MG/ML
100 INJECTION, SOLUTION INTRAVENOUS CONTINUOUS
Status: DISCONTINUED | OUTPATIENT
Start: 2019-10-04 | End: 2019-10-06

## 2019-10-04 RX ORDER — ONDANSETRON 4 MG/1
4 TABLET, FILM COATED ORAL EVERY 6 HOURS PRN
Status: DISCONTINUED | OUTPATIENT
Start: 2019-10-04 | End: 2019-10-06 | Stop reason: HOSPADM

## 2019-10-04 RX ORDER — BISACODYL 10 MG
10 SUPPOSITORY, RECTAL RECTAL DAILY PRN
Status: DISCONTINUED | OUTPATIENT
Start: 2019-10-04 | End: 2019-10-06 | Stop reason: HOSPADM

## 2019-10-04 RX ORDER — DEXAMETHASONE SODIUM PHOSPHATE 4 MG/ML
INJECTION, SOLUTION INTRA-ARTICULAR; INTRALESIONAL; INTRAMUSCULAR; INTRAVENOUS; SOFT TISSUE AS NEEDED
Status: DISCONTINUED | OUTPATIENT
Start: 2019-10-04 | End: 2019-10-04 | Stop reason: SURG

## 2019-10-04 RX ORDER — ONDANSETRON 2 MG/ML
4 INJECTION INTRAMUSCULAR; INTRAVENOUS EVERY 6 HOURS PRN
Status: DISCONTINUED | OUTPATIENT
Start: 2019-10-04 | End: 2019-10-06 | Stop reason: HOSPADM

## 2019-10-04 RX ORDER — PROPOFOL 10 MG/ML
VIAL (ML) INTRAVENOUS AS NEEDED
Status: DISCONTINUED | OUTPATIENT
Start: 2019-10-04 | End: 2019-10-04 | Stop reason: SURG

## 2019-10-04 RX ORDER — MORPHINE SULFATE 2 MG/ML
1 INJECTION, SOLUTION INTRAMUSCULAR; INTRAVENOUS EVERY 4 HOURS PRN
Status: DISCONTINUED | OUTPATIENT
Start: 2019-10-04 | End: 2019-10-06 | Stop reason: HOSPADM

## 2019-10-04 RX ORDER — FAMOTIDINE 10 MG/ML
20 INJECTION, SOLUTION INTRAVENOUS ONCE
Status: COMPLETED | OUTPATIENT
Start: 2019-10-04 | End: 2019-10-04

## 2019-10-04 RX ORDER — FENTANYL CITRATE 50 UG/ML
INJECTION, SOLUTION INTRAMUSCULAR; INTRAVENOUS AS NEEDED
Status: DISCONTINUED | OUTPATIENT
Start: 2019-10-04 | End: 2019-10-04 | Stop reason: SURG

## 2019-10-04 RX ORDER — ATRACURIUM BESYLATE 10 MG/ML
INJECTION, SOLUTION INTRAVENOUS AS NEEDED
Status: DISCONTINUED | OUTPATIENT
Start: 2019-10-04 | End: 2019-10-04 | Stop reason: SURG

## 2019-10-04 RX ORDER — SODIUM CHLORIDE, SODIUM LACTATE, POTASSIUM CHLORIDE, CALCIUM CHLORIDE 600; 310; 30; 20 MG/100ML; MG/100ML; MG/100ML; MG/100ML
20 INJECTION, SOLUTION INTRAVENOUS ONCE
Status: COMPLETED | OUTPATIENT
Start: 2019-10-04 | End: 2019-10-04

## 2019-10-04 RX ADMIN — PIPERACILLIN AND TAZOBACTAM 3.38 G: 3; .375 INJECTION, POWDER, LYOPHILIZED, FOR SOLUTION INTRAVENOUS at 17:26

## 2019-10-04 RX ADMIN — MORPHINE SULFATE 1 MG: 2 INJECTION, SOLUTION INTRAMUSCULAR; INTRAVENOUS at 20:13

## 2019-10-04 RX ADMIN — ONDANSETRON 4 MG: 2 INJECTION INTRAMUSCULAR; INTRAVENOUS at 15:35

## 2019-10-04 RX ADMIN — LIDOCAINE HYDROCHLORIDE 50 MG: 10 INJECTION, SOLUTION EPIDURAL; INFILTRATION; INTRACAUDAL; PERINEURAL at 15:33

## 2019-10-04 RX ADMIN — ATRACURIUM BESYLATE 25 MG: 10 INJECTION, SOLUTION INTRAVENOUS at 15:33

## 2019-10-04 RX ADMIN — DEXAMETHASONE SODIUM PHOSPHATE 8 MG: 4 INJECTION, SOLUTION INTRAMUSCULAR; INTRAVENOUS at 15:35

## 2019-10-04 RX ADMIN — SODIUM CHLORIDE 100 ML/HR: 9 INJECTION, SOLUTION INTRAVENOUS at 17:25

## 2019-10-04 RX ADMIN — PROPOFOL 100 MG: 10 INJECTION, EMULSION INTRAVENOUS at 15:33

## 2019-10-04 RX ADMIN — FENTANYL CITRATE 100 MCG: 50 INJECTION, SOLUTION INTRAMUSCULAR; INTRAVENOUS at 15:33

## 2019-10-04 RX ADMIN — SODIUM CHLORIDE, POTASSIUM CHLORIDE, SODIUM LACTATE AND CALCIUM CHLORIDE 20 ML/HR: 600; 310; 30; 20 INJECTION, SOLUTION INTRAVENOUS at 14:45

## 2019-10-04 RX ADMIN — FAMOTIDINE 20 MG: 10 INJECTION, SOLUTION INTRAVENOUS at 14:44

## 2019-10-04 RX ADMIN — SODIUM CHLORIDE, POTASSIUM CHLORIDE, SODIUM LACTATE AND CALCIUM CHLORIDE: 600; 310; 30; 20 INJECTION, SOLUTION INTRAVENOUS at 15:35

## 2019-10-04 NOTE — PROGRESS NOTES
See ERCP note for further details    Failed CBD cannulation.  Pancreatic duct was cannulated with wire.  No contrast was injected and a 5 Grenadian/6 cm half pigtail pancreatic stent was placed for pink otitis prophylaxis.  Needle-knife over pancreatic stent was limited due to flat papilla likely due to surrounding duodenal edema related to underlying pancreatitis.  To minimize risk for complications  scope was withdrawn with intent to repeat ERCP in 1 week

## 2019-10-04 NOTE — PROGRESS NOTES
Discharge Planning Assessment   [place]     Patient Name: Janeth Mitchell  MRN: [id number]  Today's Date: 10/4/2019    Admit Date: 10/2/2019    Discharge Needs Assessment    No documentation.       Discharge Plan     Row Name 10/04/19 0712       Plan    Final Discharge Disposition Code  02 - [facility] for  care    Final Note  Patient discharged to [facility] via [facility].          Destination - Selection Complete      Service Provider Request Status Selected Services Address Phone Number Fax Number    [address] [address] [phone] --      Durable Medical Equipment      No service coordination in this encounter.      Dialysis/Infusion      No service coordination in this encounter.      Home Medical Care      No service coordination in this encounter.      Therapy      No service coordination in this encounter.      Community Resources      No service coordination in this encounter.        Expected Discharge Date and Time     Expected Discharge Date Expected Discharge Time    Oct 3, 2019         Demographic Summary    No documentation.       Functional Status    No documentation.       Psychosocial    No documentation.       Abuse/Neglect    No documentation.       Legal    No documentation.       Substance Abuse    No documentation.       Patient Forms    No documentation.           Amrita Pires RN

## 2019-10-04 NOTE — NURSING NOTE
Called [facility] ambulance service, .dispatcher said it would be 3-4 hours before BLS truck available. Will rosana to check.

## 2019-10-04 NOTE — PROGRESS NOTES
Discharge Planning Assessment  [facility]     Patient Name: Janeth Mitchell  MRN: [id number]  Today's Date: 10/4/2019    Admit Date: 10/4/2019    Discharge Needs Assessment     Row Name 10/04/19 0936       Living Environment    Lives With  alone    Current Living Arrangements  home/apartment/condo    Living Arrangement Comments  Lives in a one level home. Rotates staying with her daughters at night but is independent. She cooks, cleans and drives prn.       Discharge Needs Assessment    Equipment Currently Used at Home  none    Equipment Needed After Discharge  none    Discharge Coordination/Progress  No HH or outpt services involved. She has a cane, walker and BSC that belonged to her spouse she can use as needed.        Discharge Plan     Row Name 10/04/19 0939       Plan    Plan  Home at DC    Patient/Family in Agreement with Plan  yes    Plan Comments  I spoke with the pts daughter. Nursing is assisting the pt currently. No DC needs at this time.    Row Name 10/04/19 0824       Plan    Final Discharge Disposition Code  01 - home or self-care        Destination      No service coordination in this encounter.      Durable Medical Equipment      No service coordination in this encounter.      Dialysis/Infusion      No service coordination in this encounter.      Home Medical Care      No service coordination in this encounter.      Therapy      No service coordination in this encounter.      Community Resources      No service coordination in this encounter.        Expected Discharge Date and Time     Expected Discharge Date Expected Discharge Time    Oct 6, 2019         Demographic Summary    No documentation.       Functional Status     Row Name 10/04/19 0936       Functional Status    Usual Activity Tolerance  good       Functional Status, IADL    Medications  independent    Meal Preparation  independent    Housekeeping  independent    Laundry  independent    Shopping  independent        Psychosocial    No  documentation.       Abuse/Neglect    No documentation.       Legal    No documentation.       Substance Abuse    No documentation.       Patient Forms    No documentation.           Leidy Feng RN

## 2019-10-04 NOTE — ANESTHESIA POSTPROCEDURE EVALUATION
Patient: Janeth Mitchell    Procedure Summary     Date:  10/04/19 Room / Location:  [facility] ENDOSCOPY 2 /  [facility]    Anesthesia Start:  1528 Anesthesia Stop:      Procedure:  ENDOSCOPIC RETROGRADE CHOLANGIOPANCREATOGRAPHY (N/A ) Diagnosis:      Surgeon:  Kranthi Hercules MD Provider:  Marcus Palomino MD    Anesthesia Type:  general ASA Status:  3          Anesthesia Type: general  Last vitals  /74    97   Pulse 84   Resp 18   SpO2 92     Post Anesthesia Care and Evaluation    Patient location during evaluation: PACU  Patient participation: complete - patient participated  Level of consciousness: awake and alert  Pain score: 0  Pain management: adequate  Airway patency: patent  Anesthetic complications: No anesthetic complications  PONV Status: none  Cardiovascular status: hemodynamically stable and acceptable  Respiratory status: nonlabored ventilation, acceptable and nasal cannula  Hydration status: acceptable

## 2019-10-04 NOTE — NURSING NOTE
"Unable to obtain IV access, despite multiple attempts by night shift with ultrasound guidance by multiple people. Attempts by me were also unsuccessful. PICC line unable to be placed despite attempts by both nurses on both arms.  Pt and family very upset. Pt stating \"I am dehydrated and I just want to go home and let my arms heal and if I die then I die.\" Daughter very upset, yelling at me for lack of IV access. Endo nurse notified. SMILEY Carter notified.   "

## 2019-10-04 NOTE — H&P
[facility] Medicine Services  HISTORY AND PHYSICAL    Patient Name: Janeth Mitchell  : 1935  MRN: [id number]  Primary Care Physician: Alexx Tafoya MD    Subjective   Subjective     Chief Complaint:  Abdominal pain, nausea/vomiting     HPI:  Janeth Mitchell is a 84 y.o. female with a past medical history significant for essential hypertension and arthritis presented to [facility] on 10-2-2019 with complaints of diffuse, sharp abdominal pain with radiation around to her back that began after cooking dinner.  Shortly afterwards patient began to have nausea with multiple episodes of vomiting.  At one point patient was ambulating with assistance of her daughter when she felt weak n her knees and had a syncopal episode that was reportedly <1 minute.  Patient was then taken to [facility] for further evaluation.  Upon arrival to the ED at [facility] vital signs were temperature 97.9, pulse 83, respirations 20, blood pressure 90/50, oxygen saturation 95% on room air.  Arterial blood gas shows pH 7.363, PCO2 36.1, PO2 61.0, HCO3 20.5.  Troponin T <0.010, sodium 140, potassium 3.7, creatinine 1.16, BUN 17, eGFR 45, alkaline phosphatase 145, ALT 38, AST 95, C-RP 0.62, lactate 3.7, lipase greater than 3000, magnesium 1.4, WBC 14.54, hemoglobin 13.7, hematocrit 41.8. Urinalysis shows trace blood, negative nitrites, large leukocytes, WBCs too numerous to count, and 4+ bacteria. CT of abdomen and pelvis without contrast showed findings consistent with acute pancreatitis.  CT of head was unremarkable.  MRCP obtained on 10-3-2019  Small signal filling defect in the distal common bile duct stone at the level of ampulla most consistent with a small 4 mm common duct stone.  Mild pancreatitis with at least 2 small pseudocysts noted with the largest 2.0 cm.  Layering sludge and or small stones within the gallbladder. Liver cirrhosis and mild ascites. Patient was transferred to [facility]  for possible ERCP.      Currently patient denies any fever, chills, nausea, vomiting, diarrhea, abdominal pain, chest pain,shortness of air or cough.     Review of Systems   Constitutional: Negative for activity change, appetite change, chills, diaphoresis, fatigue, fever and unexpected weight change.   HENT: Negative.    Eyes: Negative for photophobia and visual disturbance.   Respiratory: Negative for cough and shortness of breath.    Cardiovascular: Negative for chest pain, palpitations and leg swelling.   Gastrointestinal: Positive for abdominal pain, nausea and vomiting. Negative for abdominal distention, blood in stool, constipation and diarrhea.   Genitourinary: Negative for difficulty urinating, dysuria, flank pain, frequency and hematuria.   Musculoskeletal: Positive for back pain. Negative for neck pain and neck stiffness.   Skin: Negative.    Neurological: Positive for syncope and weakness. Negative for dizziness, facial asymmetry, speech difficulty, light-headedness and headaches.   Psychiatric/Behavioral: Negative.         Otherwise 10-system ROS reviewed and is negative except as mentioned in the HPI.    Personal History     Past Medical History:   Diagnosis Date   • Arthritis    • Hypertension        Past Surgical History:   Procedure Laterality Date   • HYSTERECTOMY      polps off cervic   • SKIN BIOPSY         Family History: family history includes Cancer in her mother; Diabetes in her mother; Stroke in her father.     Social History:  reports that she has quit smoking. She has never used smokeless tobacco. She reports that she does not drink alcohol or use drugs.    Medications:  Medications Prior to Admission   Medication Sig Dispense Refill Last Dose   • cefTRIAXone (ROCEPHIN) 1 g/100 mL 0.9% NS (MBP) Infuse 100 mL into a venous catheter Daily for 3 doses. 300 mL 0    • famotidine (PEPCID) 10 MG/ML solution injection Infuse 2 mL into a venous catheter Daily.      • Heparin Sodium, Porcine,  (HEPARIN, PORCINE,) 5000 UNIT/ML injection Inject 1 mL under the skin into the appropriate area as directed Every 12 (Twelve) Hours.      • lactobacillus acidophilus (RISAQUAD) capsule capsule Take 1 capsule by mouth Daily.      • lidocaine (LIDODERM) 5 % Place 1 patch on the skin as directed by provider Daily. Remove & Discard patch within 12 hours or as directed by MD      • magnesium sulfate 2 GM/50ML infusion Infuse 50 mL into a venous catheter As Needed (Mg 1.6 - 1.9 mg/dL).      • magnesium sulfate 4 GM/100ML infusion Infuse 100 mL into a venous catheter As Needed (Mg less than or equal to 1mg/dL).      • magnesium sulfate in D5W 1g/100mL, PREMIX, 1-5 GM/100ML-% IVPB Infuse 100 mL into a venous catheter As Needed (Mg 1.1 - 1.5 mg/dL).      • metroNIDAZOLE (FLAGYL) 5-0.79 MG/ML-% IVPB Infuse 100 mL into a venous catheter Every 8 (Eight) Hours for 20 doses. 2000 mL 0    • morphine 2 MG/ML injection Infuse 1 mL into a venous catheter Every 4 (Four) Hours As Needed for Moderate Pain  (hold for oversedation, SBP<100) for up to 10 days.      • naloxone (NARCAN) 0.4 MG/ML injection Infuse 1 mL into a venous catheter Every 5 (Five) Minutes As Needed for Respiratory Depression.      • ondansetron (ZOFRAN) 4 MG/2ML injection Infuse 2 mL into a venous catheter Every 6 (Six) Hours As Needed for Nausea or Vomiting.      • promethazine (PHENERGAN) 12.5 MG tablet Take 1 tablet by mouth Every 6 (Six) Hours As Needed for Nausea or Vomiting.      • sodium chloride 0.9 % solution 50 mL with promethazine 25 MG/ML solution 12.5 mg Infuse 12.5 mg into a venous catheter Every 6 (Six) Hours As Needed (nausea/vomiting).      • sodium chloride 0.9 % solution Infuse 125 mL/hr into a venous catheter Continuous.          Allergies   Allergen Reactions   • Sulfa Antibiotics Angioedema     States she cant remember       Objective   Objective     Vital Signs:   Heart Rate:  [74-76] 74  Resp:  [16] 16  BP: (129-144)/(63-68) 144/68         Physical Exam   Constitutional: She is oriented to person, place, and time. She appears well-developed and well-nourished. No distress.   Alert and oriented x4, daughter at bedside.    HENT:   Head: Normocephalic and atraumatic.   Eyes: Conjunctivae and EOM are normal. Pupils are equal, round, and reactive to light. Right eye exhibits no discharge. Left eye exhibits no discharge. No scleral icterus.   Neck: Normal range of motion. Neck supple. No JVD present. No tracheal deviation present. No thyromegaly present.   Cardiovascular: Normal rate, regular rhythm, normal heart sounds and intact distal pulses. Exam reveals no gallop and no friction rub.   No murmur heard.  Pulmonary/Chest: Effort normal and breath sounds normal. No respiratory distress. She has no wheezes. She has no rales. She exhibits no tenderness.   Abdominal: Soft. Bowel sounds are normal. She exhibits no distension and no mass. There is no tenderness. There is no rebound and no guarding. No hernia.   Musculoskeletal: Normal range of motion. She exhibits no edema or deformity.   Lymphadenopathy:     She has no cervical adenopathy.   Neurological: She is alert and oriented to person, place, and time.   Skin: Skin is warm and dry. No rash noted. She is not diaphoretic. No erythema. No pallor.   Psychiatric: She has a normal mood and affect. Her behavior is normal. Judgment and thought content normal.   Nursing note and vitals reviewed.       Results Reviewed:  I have personally reviewed current lab, radiology, and data and agree.    Results from last 7 days   Lab Units 10/03/19  0602 10/03/19  0102   WBC 10*3/mm3 11.82* 14.54*   HEMOGLOBIN g/dL 13.3 13.7   HEMATOCRIT % 40.6 41.8   PLATELETS 10*3/mm3 163 171   INR   --  1.14*     Results from last 7 days   Lab Units 10/03/19  0602   SODIUM mmol/L 139   POTASSIUM mmol/L 4.4   CHLORIDE mmol/L 104   CO2 mmol/L 20.7*   BUN mg/dL 19   CREATININE mg/dL 1.20*   GLUCOSE mg/dL 117*   CALCIUM mg/dL 9.2   ALT  (SGPT) U/L 41*   AST (SGOT) U/L 93*     No results found for: BNP  pH, Arterial   Date Value Ref Range Status   10/02/2019 7.363 7.350 - 7.450 pH units Final     Imaging Results (last 24 hours)     Procedure Component Value Units Date/Time    XR Chest 1 View [[id number]] Collected:  10/04/19 0256     Updated:  10/04/19 0258    Narrative:       CR Chest 1 Vw    INDICATION:   Evaluate pleural effusions     COMPARISON:    10/2/2019    FINDINGS:  Single portable AP view(s) of the chest.        The heart and mediastinal contours are normal. The lungs are clear. No pneumothorax or pleural effusion.       Impression:       No acute cardiopulmonary findings.. No pleural effusions are visible    Signer Name: Ricki Perera MD   Signed: 10/4/2019 2:56 AM   Workstation Name: [email or web address]-    [facility]             Assessment/Plan   Assessment / Plan     Active Hospital Problems    Diagnosis   • **Gallstone pancreatitis   • Cirrhosis of liver (CMS/HCC)   • Pseudocyst of pancreas   • Essential hypertension   • Arthritis   • UTI (urinary tract infection), bacterial   • Syncope         Assessment & Plan:  84 year old female transferred from [facility] secondary to acute gallstone pancreatitis and cirrhosis for ERCP in am.     1) Gallstone pancreatitis  -NPO  -labs pending  -consult GI, Dr. Arellano aware  -MRCP and CT abdomen/pelvis as noted above  -US gallbladder unremarkable   -start zosyn, received flagyl at OSH   -blood cultures pending  -prn pain medication  -prn -anti-emetics   -general surgery consultation    2) UTI  -UA from outlying facility shows WBC too numerous to count, 4+ bacteria, large leuks  -received rocephin at OSH  -urine cultures from OSH positive for gram negative bacilli   -will start zosyn  -repeat UA pending    3) liver cirrhosis  -new diagnosis  -GI to see in am  -check GGT, hepatitis panel  -PT/INR pending     4) Syncope  -likely vasovagal  -CT of head negative, carotid duplex  negative  -up with assistance  -fall precautions    5) Hypomagnesemia  -mag from OSH 1.4, replaced  -repeat MAG 2.5  -monitor    6) Essential hypertension  -on no home medications  -BP stable     DVT prophylaxis:scds only for now     CODE STATUS:  Full code  Code Status and Medical Interventions:   Ordered at: 10/04/19 0330     Level Of Support Discussed With:    Patient     Code Status:    CPR     Medical Interventions (Level of Support Prior to Arrest):    Full       Admission Status:  I believe this patient meets INPATIENT status due to gallstone pancreatitis and need for ERCP.  I feel patient’s risk for adverse outcomes and need for care warrant INPATIENT evaluation and I predict the patient’s care encounter to likely last beyond 2 midnights.    Ariadna Hale, DO   10/04/19   4:15 AM      Brief Attending Admission Attestation     I have seen and examined the patient, performing an independent face-to-face diagnostic evaluation with plan of care reviewed and developed with the advanced practice clinician (APC).      Brief Summary Statement:   Janeth Mitchell is a 84 y.o. female with a PMH significant for HTN, arthritis who presents as a transfer from [facility] for gallstone pancreatitis.  Patient presented to [facility] on 10/2/2019 with complaints of acute onset diffuse, sharp abdominal pain with radiation to her back with associated nausea and vomiting.  She was brought to [facility] ED and found to have gallstone pancreatitis.  She was transferred here for gastroenterology/ERCP and general surgery consultation.    Remainder of detailed HPI is as noted above and has been reviewed and/or edited by me for completeness.      Attending Physical Exam:  Constitutional: Awake, alert  Eyes: PERRLA, sclerae anicteric, no conjunctival injection  HENT: NCAT, mucous membranes moist  Neck: Supple, no thyromegaly, no lymphadenopathy, trachea midline  Respiratory: Clear to auscultation bilaterally, nonlabored respirations   Cardiovascular:  RRR, no murmurs, rubs, or gallops, palpable pedal pulses bilaterally  Gastrointestinal: Positive bowel sounds, soft, nontender, nondistended  Musculoskeletal: No bilateral ankle edema, no clubbing or cyanosis to extremities  Psychiatric: Appropriate affect, cooperative  Neurologic: Oriented x 3, strength symmetric in all extremities, Cranial Nerves grossly intact to confrontation, speech clear  Skin: No rashes        Brief Assessment/Plan :  See above for further detailed assessment and plan developed with APC which I have reviewed and/or edited for completeness.      Electronically signed by Ariadna Hale DO, 10/04/19, 4:14 AM.

## 2019-10-04 NOTE — PROGRESS NOTES
[facility] Medicine Services  ADMISSION FOLLOW-UP NOTE          Patient admitted after midnight, H&P by my partner performed earlier on today's date reviewed.  Interim findings, labs, and charting also reviewed.        The [facility] Hospital Problem List has been managed and updated to include any new diagnoses:  Active Hospital Problems    Diagnosis  POA   • **Gallstone pancreatitis [K85.10]  Yes   • Cirrhosis of liver (CMS/HCC) [K74.60]  Yes   • Pseudocyst of pancreas [K86.3]  Yes   • Essential hypertension [I10]  Yes   • Arthritis [M19.90]  Yes   • UTI (urinary tract infection), bacterial [N39.0, A49.9]  Yes   • Syncope [R55]  Yes      Resolved Hospital Problems   No resolved problems to display.         ADDITIONAL PLAN:  - detailed assessment and plan form admission reviewed, continue plan of care per this H&P  - difficult IV access--- nursing and PICC team unable to place. D/w Dr Arellano. Subsequently d/w anesthesia, who will place deep line access prior to ERCP.         Electronically signed by Obdulia Umana MD, 10/04/19, 2:01 PM.

## 2019-10-04 NOTE — ANESTHESIA PREPROCEDURE EVALUATION
Anesthesia Evaluation     Patient summary reviewed and Nursing notes reviewed   no history of anesthetic complications:  NPO Solid Status: > 8 hours  NPO Liquid Status: > 2 hours           Airway   TM distance: >3 FB  Neck ROM: full  Possible difficult intubation  Dental      Pulmonary     breath sounds clear to auscultation  (+) a smoker Former Abstained day of surgery,   Cardiovascular   Exercise tolerance: poor (<4 METS)    Rhythm: regular  Rate: normal    (+) hypertension well controlled less than 2 medications,       Neuro/Psych  (+) syncope,     GI/Hepatic/Renal/Endo    (+)   liver disease,     ROS Comment: CIRHOSIS    Musculoskeletal     Abdominal   (+) obese,     Abdomen: soft.   Substance History      OB/GYN          Other   (+) arthritis                     Anesthesia Plan    ASA 3     general     intravenous induction   Anesthetic plan, all risks, benefits, and alternatives have been provided, discussed and informed consent has been obtained with: patient.    Plan discussed with CRNA.

## 2019-10-04 NOTE — PLAN OF CARE
Problem: Patient Care Overview  Goal: Plan of Care Review  Outcome: Ongoing (interventions implemented as appropriate)   10/04/19 1027   Coping/Psychosocial   Plan of Care Reviewed With patient   Plan of Care Review   Progress no change   OTHER   Outcome Summary Taken to PACU around 13:15 for line placement/ERCP. Patient returned with EJ. Plan to do another procedure but unsure when.

## 2019-10-04 NOTE — DISCHARGE SUMMARY
[facility] HOSPITALISTS DISCHARGE SUMMARY    Patient Identification:  Name:  Janeth Mitchell  Age:  84 y.o.  Sex:  female  :  1935  MRN:  [id number]  Visit Number:  [id number]    Date of Admission: 10/2/2019  Date of Discharge:  10/4/2019     PCP: Alexx Tafoya MD    DISCHARGE DIAGNOSIS  Severe Sepsis  Acute calculous cholecystitis  Acute UTI  Acute gallstone pancreatitis  EMILIA  Lactic acidosis  Liver Cirrhosis with mild ascitis, new diagnosis  Transaminitis  Syncope, likely vasovagal  Hypomagnesemia  Essential HTN    CONSULTS   None    PROCEDURES PERFORMED  None    HOSPITAL COURSE  Ms. Mitchell is a 84 y.o. female with past medical history significant for essential hypertension and arthritis presented to [facility] complaining of nausea and vomiting.  Please see the admitting history and physical for further details.   Patient was admitted with sepsis, acute pancreatitis, acute kidney injury and UTI and started on IV fluids and IV antibiotics and kept Nothing by mouth.  CT abdomen pelvis and ultrasound gallbladder was done which had conflicting findings.  MRCP was done which showed Layering hypointense material within the gallbladder which is somewhat distended compatible with small layering stones or sludge. Pericholecystic fluid is noted. The common hepatic duct is mildly dilated and is approximately 7 mm in diameter. The common bile duct has a maximal diameter of approximately 8 mm. There is a small signal defect  noted in the distal common bile duct near the ampulla best seen on axial T2 sequence image #19 that is most consistent with a distal common duct stone and is just beyond the pancreatic duct origin. Mild pancreatitis with at least 2 small pseudocysts noted the largest of which is 2.0 cm. Nodular liver margins are noted compatible with cirrhosis.    Patient with acute gallstone pancreatitis.  Patient would need ERCP and evaluation of near cirrhosis and no GI  specialist available at [facility] so discussed with Dr. Armando Arellano, GI specialist at [facility] and patient was accepted to be admitted under the hospitalist service.  Patient was discharged to [facility] in stable condition.  Patient and family agreeable.      VITAL SIGNS:  Temp:  [98 °F (36.7 °C)-98.5 °F (36.9 °C)] 98.2 °F (36.8 °C)  Heart Rate:  [59-76] 73  Resp:  [11-20] 20  BP: (108-138)/(43-73) 125/45  SpO2:  [92 %-99 %] 95 %  on   ;   Device (Oxygen Therapy): room air    Body mass index is 32.81 kg/m².  Wt Readings from Last 3 Encounters:   10/04/19 76 kg (167 lb 9.6 oz)   10/03/19 76.2 kg (168 lb)       PHYSICAL EXAM:  General: Comfortable, Not in distress.  Well-developed and well-nourished.   HENT:  Head:  Normocephalic and atraumatic.  Mouth:  Moist mucous membranes.    Eyes:  Conjunctivae and EOM are normal.  Pupils are equal, round, and reactive to light.  No scleral icterus.    Neck:  Neck supple.  No JVD present. Trachea midline.     Cardiovascular:  Normal rate, regular rhythm with no murmur.  Pulmonary/Chest:  No respiratory distress, no wheezes, no crackles, with normal breath sounds and good air movement.  Abdomen:  Soft.  Bowel sounds are normal.  No distension and + epigastric tenderness. No organomegaly.   Musculoskeletal:  No edema, no tenderness, and no deformity.  No red or swollen joints anywhere.    Neurological:  Alert and oriented to person, place, and time.  No cranial nerve deficit. No focal deficits. No facial droop.  No slurred speech.   Skin:  Skin is warm and dry. No rash noted. No pallor.   Peripheral vascular:  pulses in all 4 extremities with no clubbing, no cyanosis, no edema.  Genitourinary: no reed    DISCHARGE DISPOSITION   [facility]    DISCHARGE MEDICATIONS:     Discharge Medications      New Medications      Instructions Start Date   cefTRIAXone  Commonly known as:  ROCEPHIN   1 g, Intravenous, Every 24 Hours      famotidine 10 MG/ML solution  injection  Commonly known as:  PEPCID   20 mg, Intravenous, Daily      heparin (porcine) 5000 UNIT/ML injection   5,000 Units, Subcutaneous, Every 12 Hours Scheduled      lactobacillus acidophilus capsule capsule   1 capsule, Oral, Daily      lidocaine 5 %  Commonly known as:  LIDODERM   1 patch, Transdermal, Every 24 Hours Scheduled, Remove & Discard patch within 12 hours or as directed by MD      magnesium sulfate 4 GM/100ML infusion   4 g, Intravenous, As Needed      magnesium sulfate 2 GM/50ML infusion   2 g, Intravenous, As Needed      magnesium sulfate in D5W 1g/100mL (PREMIX) 1-5 GM/100ML-% IVPB   1 g, Intravenous, As Needed      metroNIDAZOLE 5-0.79 MG/ML-% IVPB  Commonly known as:  FLAGYL   500 mg, Intravenous, Every 8 Hours      morphine 2 MG/ML injection   2 mg, Intravenous, Every 4 Hours PRN      naloxone 0.4 MG/ML injection  Commonly known as:  NARCAN   0.4 mg, Intravenous, Every 5 Minutes PRN      ondansetron 4 MG/2ML injection  Commonly known as:  ZOFRAN   4 mg, Intravenous, Every 6 Hours PRN      promethazine 12.5 MG tablet  Commonly known as:  PHENERGAN   12.5 mg, Oral, Every 6 Hours PRN      sodium chloride 0.9 % solution   125 mL/hr, Intravenous, Continuous      sodium chloride 0.9 % solution 50 mL with promethazine 25 MG/ML solution 12.5 mg   12.5 mg, Intravenous, Every 6 Hours PRN         Stop These Medications    enalapril 20 MG tablet  Commonly known as:  VASOTEC     triamterene-hydrochlorothiazide 37.5-25 MG per tablet  Commonly known as:  MAXZIDE-25              No future appointments.     Contact information for follow-up providers     Alexx Tafoya MD .    Specialty:  Internal Medicine  Contact information:  [address] [facility] [address] [phone]                   Contact information for after-discharge care     Destination     [facility] .    Service:  [facility]  Contact information:  [address] [facility]  [address]  [phone]                              TEST  RESULTS PENDING AT DISCHARGE   Order Current Status    Blood Culture - Blood, Arm, Right Preliminary result    Blood Culture - Blood, Hand, Right Preliminary result    Urine Culture - Urine, Urine, Catheter Preliminary result           CODE STATUS  Code Status and Medical Interventions:   Ordered at: 10/03/19 0233     Level Of Support Discussed With:    Patient     Code Status:    CPR     Medical Interventions (Level of Support Prior to Arrest):    Full       Olimpia Rushing MD  10/04/19  7:37 AM    Please note that this discharge summary required more than 30 minutes to complete.    Please send a copy of this dictation to the following providers:  Alexx Tafoya MD

## 2019-10-04 NOTE — PROGRESS NOTES
Asked by Dr. Spurling to perform ERCP.    Patient examined.  Chart reviewed.  ERCP discussed with patient and family again in particular we have reviewed the risk of severe pancreatitis, bleeding, cardiorespiratory compromise, and the increased risks given what appears to be underlying cirrhosis.  All questions they had were answered.  They wish to have us proceed with further recommendations therefore deferred pending findings and outcome of her ERCP.

## 2019-10-04 NOTE — CONSULTS
[facility] Consult    Referring Provider: Tati Vuong MD    PCP: Alexx Tafoya MD    Reason for Consultation: Choledocholithiasis, Newly diagnosed Cirrhosis     Chief complaint: Nausea, vomiting and abdominal pain     History of present illness:    Janeth Mitchell is a 84 y.o. female who is admitted with choledocholithiasis, acute pancreatitis and findings of cirrhosis on MRCP.   She presented to [facility] two days ago with a chief complaint of acute epigastric pain radiating to her back with nausea and vomiting.   She had a similar episode that occurred six weeks ago but spontaneously resolved and she did not seek medical attention at that time.     At [place], she was found to have a lipase > 3,000 and a noncontrast CT show findings of acute pancreatitis.   MRCP showed a 4 mm common bile duct stone with borderline dilation of the CBD, small stones within the gallbladder and liver cirrhosis with mild ascites.     She denies any history of known liver disease nor alcohol use.   She does state she has had elevated LFTs for quite some time.      Allergies:  Sulfa antibiotics    Scheduled Meds:    piperacillin-tazobactam 3.375 g Intravenous Once   piperacillin-tazobactam 3.375 g Intravenous Q8H        Infusions:    sodium chloride 100 mL/hr       PRN Meds:  •  bisacodyl  •  magnesium hydroxide  •  Morphine **AND** naloxone  •  ondansetron **OR** ondansetron    Home Meds:  Medications Prior to Admission   Medication Sig Dispense Refill Last Dose   • cefTRIAXone (ROCEPHIN) 1 g/100 mL 0.9% NS (MBP) Infuse 100 mL into a venous catheter Daily for 3 doses. 300 mL 0    • famotidine (PEPCID) 10 MG/ML solution injection Infuse 2 mL into a venous catheter Daily.      • Heparin Sodium, Porcine, (HEPARIN, PORCINE,) 5000 UNIT/ML injection Inject 1 mL under the skin into the appropriate area as directed Every 12 (Twelve) Hours.      • lactobacillus acidophilus (RISAQUAD) capsule capsule Take 1  capsule by mouth Daily.      • lidocaine (LIDODERM) 5 % Place 1 patch on the skin as directed by provider Daily. Remove & Discard patch within 12 hours or as directed by MD      • magnesium sulfate 2 GM/50ML infusion Infuse 50 mL into a venous catheter As Needed (Mg 1.6 - 1.9 mg/dL).      • magnesium sulfate 4 GM/100ML infusion Infuse 100 mL into a venous catheter As Needed (Mg less than or equal to 1mg/dL).      • magnesium sulfate in D5W 1g/100mL, PREMIX, 1-5 GM/100ML-% IVPB Infuse 100 mL into a venous catheter As Needed (Mg 1.1 - 1.5 mg/dL).      • metroNIDAZOLE (FLAGYL) 5-0.79 MG/ML-% IVPB Infuse 100 mL into a venous catheter Every 8 (Eight) Hours for 20 doses. 2000 mL 0    • morphine 2 MG/ML injection Infuse 1 mL into a venous catheter Every 4 (Four) Hours As Needed for Moderate Pain  (hold for oversedation, SBP<100) for up to 10 days.      • naloxone (NARCAN) 0.4 MG/ML injection Infuse 1 mL into a venous catheter Every 5 (Five) Minutes As Needed for Respiratory Depression.      • ondansetron (ZOFRAN) 4 MG/2ML injection Infuse 2 mL into a venous catheter Every 6 (Six) Hours As Needed for Nausea or Vomiting.      • promethazine (PHENERGAN) 12.5 MG tablet Take 1 tablet by mouth Every 6 (Six) Hours As Needed for Nausea or Vomiting.      • sodium chloride 0.9 % solution 50 mL with promethazine 25 MG/ML solution 12.5 mg Infuse 12.5 mg into a venous catheter Every 6 (Six) Hours As Needed (nausea/vomiting).      • sodium chloride 0.9 % solution Infuse 125 mL/hr into a venous catheter Continuous.          ROS: Review of Systems   Constitutional: Positive for chills and fatigue.   HENT: Negative.    Eyes: Negative.    Respiratory: Negative.    Cardiovascular: Negative.    Gastrointestinal: Positive for abdominal pain, nausea and vomiting.   Endocrine: Negative.    Genitourinary: Negative.    Musculoskeletal: Negative.    Skin: Negative.    Neurological: Positive for syncope and weakness.   Hematological: Bruises/bleeds  "easily.   Psychiatric/Behavioral: Negative.        PAST MED HX:  Past Medical History:   Diagnosis Date   • Arthritis    • Hypertension        PAST SURG HX:  Past Surgical History:   Procedure Laterality Date   • HYSTERECTOMY      polps off cervic   • SKIN BIOPSY         FAM HX:  Family History   Problem Relation Age of Onset   • Diabetes Mother    • Cancer Mother    • Stroke Father        SOC HX:  Social History     Socioeconomic History   • Marital status:      Spouse name: Not on file   • Number of children: Not on file   • Years of education: Not on file   • Highest education level: Not on file   Tobacco Use   • Smoking status: Former Smoker   • Smokeless tobacco: Never Used   Substance and Sexual Activity   • Alcohol use: No     Frequency: Never   • Drug use: No   • Sexual activity: Defer       PHYSICAL EXAM  /66 (BP Location: Right arm, Patient Position: Lying)   Pulse 77   Temp 98.9 °F (37.2 °C)   Resp 16   Ht 152.4 cm (60\")   Wt 76 kg (167 lb 9.6 oz)   SpO2 93%   BMI 32.73 kg/m²   Wt Readings from Last 3 Encounters:   10/04/19 76 kg (167 lb 9.6 oz)   10/03/19 76.2 kg (168 lb)   ,body mass index is 32.73 kg/m².  Physical Exam   Constitutional: She is oriented to person, place, and time. She appears well-developed. No distress.   Appears ill but not toxic    HENT:   Head: Normocephalic and atraumatic.   Eyes: No scleral icterus.   Neck: Normal range of motion.   Cardiovascular: Normal rate and regular rhythm.   Pulmonary/Chest: Effort normal and breath sounds normal. No respiratory distress.   Abdominal: Soft. Bowel sounds are normal. She exhibits no distension. There is no tenderness.   Genitourinary:   Genitourinary Comments: Deferred    Musculoskeletal: She exhibits edema.   Neurological: She is alert and oriented to person, place, and time.   Skin: She is diaphoretic.   Ecchymosis on upper extremities bilaterally    Psychiatric: She has a normal mood and affect. Her behavior is normal. "   Nursing note and vitals reviewed.      Results Review:   I reviewed the patient's new clinical results.    Lab Results   Component Value Date    WBC 6.38 10/04/2019    HGB 12.6 10/04/2019    HGB 13.3 10/03/2019    HGB 13.7 10/03/2019    HCT 39.0 10/04/2019    .9 (H) 10/04/2019     10/04/2019       Lab Results   Component Value Date    INR 1.28 (H) 10/04/2019    INR 1.14 (H) 10/03/2019       Lab Results   Component Value Date    GLUCOSE 79 10/04/2019    BUN 17 10/04/2019    CREATININE 0.83 10/04/2019    EGFRIFNONA 66 10/04/2019    BCR 20.5 10/04/2019    CO2 21.0 (L) 10/04/2019    CALCIUM 9.1 10/04/2019    ALBUMIN 2.90 (L) 10/04/2019    ALKPHOS 127 (H) 10/04/2019    BILITOT 1.2 10/04/2019    ALT 38 (H) 10/04/2019    AST 79 (H) 10/04/2019      Ref. Range 10/4/2019 05:12   Hep A IgM Latest Ref Range: Non-Reactive  Non-Reactive   Hepatitis B Surface Ag Latest Ref Range: Non-Reactive  Non-Reactive   Hep B Core IgM Latest Ref Range: Non-Reactive  Non-Reactive   Hepatitis C Ab Latest Ref Range: Non-Reactive  Non-Reactive     Lipase > 3,000 (10/3/19).    Amylase 789 (10/3/19)  Lipase 166 (10/4/19)     CT abdomen/pelvis (as interpreted by radiologist)  Abdomen: Lung bases are clear. The peripheral border the liver is nodular suggesting cirrhosis. No focal masses are identified. The gallbladder is distended and contains multiple small dependent stones. Spleen, adrenal glands and kidneys are normal.  There is inflammatory change within the fat around the pancreas. Brain parenchyma is otherwise normal. The aorta is normal in size. There is no adenopathy. Bowel is normal except for sigmoid diverticuli.   Pelvis: Uterus and adnexal regions and bladder are normal. Bones are unremarkable.   IMPRESSION:  Findings are consistent with acute pancreatitis.    Ultrasound (as interpreted by radiologist)  This ultrasound limited to the gallbladder. The gallbladder is not distended. The wall is not thickened. No stones are  identified. The common bile duct is nondilated.   IMPRESSION:  Negative gallbladder ultrasound    MRCP: (as interpreted by radiologist)  IMPRESSION:   1. Small signal filling defect in the distal common bile duct at the  level of the ampulla most consistent with a small 4 mm common duct  stone.  2. Borderline dilated common bile duct and common hepatic duct as  detailed above with distention of the gallbladder.  3.Mild pancreatitis with at least 2 small pseudocysts noted the largest  of which is 2.0 cm.  4. Layering sludge and/or small stones within the gallbladder.  5. Liver cirrhosis which may account for patient's anasarca and mild  ascites.  6. Cardiomegaly and small pleural effusions.    ASSESSMENTS/PLANS    1. Choledocholithiasis   2. Acute biliary pancreatitis   3. Cholelithiasis   4. Liver cirrhosis with mild ascites.   Child class B.    5. Coagulopathy     >>> Recommend ERCP today.     >>> Suspect cirrhosis is secondary to DHILLON.   Hepatitis panel is negative.  Further workup outpatient.    >>> Will check AFP and immune status to Hepatitis A and B.     >>> Will need to follow low sodium diet following ERCP     I discussed the patients findings and my recommendations with patient    SMILEY Yepez  10/04/19  8:07 AM  Risk and benefits explained including incomplete cannulation, 10%;  Perforation, 1/ 500;  Bleeding, 1/500:  Infection; Pancreatitis, 5 to 10% mild to moderate;  and 5% severe with 1/1000 risk of death.    Family is upset that IV access has not been able to have been obtained.  Will plan IJ line preop.

## 2019-10-04 NOTE — NURSING NOTE
Called [facility], no trucks available, dispatcher said will call as soon one is available.

## 2019-10-04 NOTE — CONSULTS
Patient Name:  Janeth Mitchell  YOB: 1935  [id number]       Patient Care Team:  Alexx Tafoya MD as PCP - General (Internal Medicine)      General Surgery Consult Note     Date of Consultation: 10/04/19    Consulting Physician - Obdulia Umana MD    Reason for Consult -gallstone pancreatitis    Subjective     I have been asked to see  Janeth Mitchell , a 84 y.o. female in consultation for gallstone pancreatitis.  She reports 2 days ago she had acute onset of epigastric abdominal pain with associated emesis.  Also had a brief syncopal episode. The pain and vomiting persisted which prompted her presentation to [facility], where she was diagnosed with biliary pancreatitis and was transferred here for further management.  She reports that since presentation her pain has significantly improved and nearly resolved.  She also reports no emesis since presentation to the hospital.  Her lipase which was greater than 3000 on presentation is now 166. WBC 6. She had a similar episode 6 weeks that lasted less than one day and resolved spontaneously.   US was unremarkable. MRCP demonstrated cholelithiasis, choledocholithiasis, 2 small pseudocysts, as well as cirrhotic liver changes.  She underwent ERCP today.  Unfortunately, they were unable to cannulate her common bile duct.  They were able to successfully cannulate the pancreatic duct and left a pancreatic duct stent in place.  Notes indicate they are planning to repeat ERCP in 1 week.      Allergy:   Allergies   Allergen Reactions   • Sulfa Antibiotics Angioedema     States she cant remember       Medications:    piperacillin-tazobactam 3.375 g Intravenous Once   piperacillin-tazobactam 3.375 g Intravenous Q8H       sodium chloride 100 mL/hr Last Rate: 100 mL/hr (10/04/19 5639)     Current Facility-Administered Medications on File Prior to Encounter   Medication   • [DISCONTINUED] cefTRIAXone (ROCEPHIN) 1 g/100 mL 0.9% NS (MBP)   •  [DISCONTINUED] famotidine (PEPCID) injection 20 mg   • [DISCONTINUED] heparin (porcine) 5000 UNIT/ML injection 5,000 Units   • [DISCONTINUED] lactobacillus acidophilus (RISAQUAD) capsule 1 capsule   • [DISCONTINUED] lidocaine (LIDODERM) 5 % 1 patch   • [DISCONTINUED] Magnesium Sulfate 2 gram infusion- Mg 1.6 - 1.9 mg/dL   • [DISCONTINUED] magnesium sulfate 3 gram infusion (1gm x 3) - Mg 1.1 - 1.5 mg/dL   • [DISCONTINUED] magnesium sulfate 4 gram infusion - Mg less than or equal to 1mg/dL   • [DISCONTINUED] metroNIDAZOLE (FLAGYL) 500 mg/100mL IVPB   • [DISCONTINUED] morphine injection 1 mg   • [DISCONTINUED] morphine injection 2 mg   • [DISCONTINUED] naloxone (NARCAN) injection 0.4 mg   • [DISCONTINUED] naloxone (NARCAN) injection 0.4 mg   • [DISCONTINUED] ondansetron (ZOFRAN) injection 4 mg   • [DISCONTINUED] promethazine (PHENERGAN) 12.5 mg in sodium chloride 0.9 % 50 mL   • [DISCONTINUED] promethazine (PHENERGAN) tablet 12.5 mg   • [DISCONTINUED] sodium chloride 0.9 % flush 10 mL   • [DISCONTINUED] sodium chloride 0.9 % flush 10 mL   • [DISCONTINUED] sodium chloride 0.9 % flush 10 mL   • [DISCONTINUED] sodium chloride 0.9 % flush 10 mL   • [DISCONTINUED] sodium chloride 0.9 % flush 10 mL   • [DISCONTINUED] sodium chloride 0.9 % infusion     Current Outpatient Medications on File Prior to Encounter   Medication Sig   • cefTRIAXone (ROCEPHIN) 1 g/100 mL 0.9% NS (MBP) Infuse 100 mL into a venous catheter Daily for 3 doses.   • famotidine (PEPCID) 10 MG/ML solution injection Infuse 2 mL into a venous catheter Daily.   • Heparin Sodium, Porcine, (HEPARIN, PORCINE,) 5000 UNIT/ML injection Inject 1 mL under the skin into the appropriate area as directed Every 12 (Twelve) Hours.   • lactobacillus acidophilus (RISAQUAD) capsule capsule Take 1 capsule by mouth Daily.   • lidocaine (LIDODERM) 5 % Place 1 patch on the skin as directed by provider Daily. Remove & Discard patch within 12 hours or as directed by MD   • magnesium  sulfate 2 GM/50ML infusion Infuse 50 mL into a venous catheter As Needed (Mg 1.6 - 1.9 mg/dL).   • magnesium sulfate 4 GM/100ML infusion Infuse 100 mL into a venous catheter As Needed (Mg less than or equal to 1mg/dL).   • magnesium sulfate in D5W 1g/100mL, PREMIX, 1-5 GM/100ML-% IVPB Infuse 100 mL into a venous catheter As Needed (Mg 1.1 - 1.5 mg/dL).   • metroNIDAZOLE (FLAGYL) 5-0.79 MG/ML-% IVPB Infuse 100 mL into a venous catheter Every 8 (Eight) Hours for 20 doses.   • morphine 2 MG/ML injection Infuse 1 mL into a venous catheter Every 4 (Four) Hours As Needed for Moderate Pain  (hold for oversedation, SBP<100) for up to 10 days.   • naloxone (NARCAN) 0.4 MG/ML injection Infuse 1 mL into a venous catheter Every 5 (Five) Minutes As Needed for Respiratory Depression.   • ondansetron (ZOFRAN) 4 MG/2ML injection Infuse 2 mL into a venous catheter Every 6 (Six) Hours As Needed for Nausea or Vomiting.   • promethazine (PHENERGAN) 12.5 MG tablet Take 1 tablet by mouth Every 6 (Six) Hours As Needed for Nausea or Vomiting.   • sodium chloride 0.9 % solution 50 mL with promethazine 25 MG/ML solution 12.5 mg Infuse 12.5 mg into a venous catheter Every 6 (Six) Hours As Needed (nausea/vomiting).   • sodium chloride 0.9 % solution Infuse 125 mL/hr into a venous catheter Continuous.   • [DISCONTINUED] enalapril (VASOTEC) 20 MG tablet Take 20 mg by mouth Daily.   • [DISCONTINUED] triamterene-hydrochlorothiazide (MAXZIDE-25) 37.5-25 MG per tablet Take 1 tablet by mouth Daily.       PMHx:   Patient Active Problem List   Diagnosis   • Acute pancreatitis   • Gallstone pancreatitis   • Cirrhosis of liver (CMS/HCC)   • Pseudocyst of pancreas   • Essential hypertension   • Arthritis   • UTI (urinary tract infection), bacterial   • Syncope       Past Surgical History:  None    Family History: Noncontributory     Social History:    Tobacco use: Former smoker   EtOH use : None    Illicit drug use: None      Review of Systems   Pertinent  "items are noted in HPI     Constitutional: No fevers, chills or malaise   Eyes: Denies visual changes    Cardiovascular: Denies chest pain, palpitations   Pulmonary: Denies cough or shortness of breath   Abdominal/ GI: See HPI    Genitourinary: Denies dysuria or hematuria   Musculoskeletal: Denies any but chronic joint aches, pains or deformities   Psychiatric: No recent mood changes   Neurologic: No paresthesias or loss of function          Objective     Physical Exam:      Vital Signs  /81 (BP Location: Right arm, Patient Position: Lying)   Pulse 88   Temp 98.2 °F (36.8 °C) (Oral)   Resp 16   Ht 152.4 cm (60\")   Wt 79.4 kg (175 lb 1.6 oz)   SpO2 94%   BMI 34.20 kg/m²   No intake or output data in the 24 hours ending 10/04/19 1807      Physical Exam:    Head: Normocephalic, atraumatic.   Eyes: Pupils equal, round, react to light and accomodation.   Mouth: Oral mucosa without lesions,   Neck: No masses, lymphadenopathy or carotid bruits bilaterally   CV: Rhythm  and rate regular , no  murmurs, rubs or gallops  Lungs: Clear  to auscultation bilaterally   Abdomen: Bowel sounds positive  , soft, minimal epigastric tenderness, negative Lester sign, no right upper quadrant tenderness  Groin : No obvious hernias bilaterally   Extremities:  No cyanosis, clubbing or edema bilaterally   Lymphatics: No abnormal lymphadenopathy appreciated   Neurologic: No gross deficits       Results Review: I have personally reviewed all of the lab and imaging results available at this time. See HPI       Assessment/Plan     Assessment and Plan:    Hospital Problem List     * (Principal) Gallstone pancreatitis  84-year-old female transferred to [facility] with gallstone pancreatitis.  Patient reports significant improvement in her pain and cessation of vomiting.  Lipase has trended downward to 166 from greater than 3000 on presentation. WBC 6. Ultrasound was unremarkable, however MRCP demonstrated " choledocholithiasis, cholelithiasis, 2 pancreatic pseudocyst, as well as cirrhotic liver changes.  ERCP was attempted today, unfortunately they were unable to cannulate the common bile duct, however they were able to relate the pancreatic duct and leave a pancreatic duct stent in place.  They are planning repeat ERCP in 1 week.  Given the difficulty of the ERCP today, the pancreatic stent in place, and planned ERCP in 1 week we will plan delayed cholecystectomy until after that time.  In the meantime, recommend further evaluation of her cirrhosis which will be helpful for surgical planning.    Cirrhosis of liver (CMS/HCC)    Pseudocyst of pancreas    Essential hypertension          Arthritis    UTI (urinary tract infection), bacterial    Syncope                  I discussed the patients findings and my recommendations with the patient and/or family, as well as the primary team     Bari Rodriguez MD  10/04/19  6:07 PM          I have personally performed the key portions of the history and physical exam, and I have edited the above note to better reflect my complete history and physical exam.      CV:  Rhythm  regular and rate regular   L:  Clear to auscultation bilaterally   Abd:  Bowel sounds positive , soft, obese, mildly tender to palpation  Ext:  No cyanosis, clubbing, edema    Labs: Labs reviewed, with elevated lipase on admission which is rapidly trended toward normal.    ERCP films personally reviewed by me as well as the operative note.  It appears that they were unable to cannulate the common bile duct due to pancreatic head edema and were able to leave the pancreatic stent.  A sphincterotomy was also performed.    Assessment/ Plan:    She seems to be resolving her pancreatitis, and could possibly have passed a stone.  I am in agreement with our gastroenterology colleagues that follow-up ERCP in a week would be prudent to rule out further common bile duct stones, and to allow time for pancreatic head edema to  resolve, hopefully making the procedure easier.  As to her gallbladder, she certainly would be a candidate for elective cholecystectomy in the future, but it would be ideal to resolve the issues with her bile duct prior to proceeding, in case operative intervention on her biliary tree is required.  I will continue to follow this patient with you.    Problem List Items Addressed This Visit     None              Chandana Hernandez MD  7:03 PM      Please note that portions of this note were completed with a voice recognition program. Efforts were made to edit the dictations, but occasionally words are mistranscribed.

## 2019-10-04 NOTE — ANESTHESIA PROCEDURE NOTES
Airway  Urgency: elective    Date/Time: 10/4/2019 3:33 PM  Airway not difficult    General Information and Staff    Patient location during procedure: OR  CRNA: Go Mccoy CRNA    Indications and Patient Condition  Indications for airway management: airway protection    Preoxygenated: yes  MILS not maintained throughout  Mask difficulty assessment: 1 - vent by mask    Final Airway Details  Final airway type: endotracheal airway      Successful airway: ETT  Cuffed: yes   Successful intubation technique: direct laryngoscopy  Facilitating devices/methods: intubating stylet  Endotracheal tube insertion site: oral  Blade: Cholo  Blade size: 3  ETT size (mm): 7.5  Cormack-Lehane Classification: grade I - full view of glottis  Placement verified by: chest auscultation and capnometry   Measured from: lips  ETT/EBT  to lips (cm): 20  Number of attempts at approach: 1    Additional Comments  Negative epigastric sounds, Breath sound equal bilaterally with symmetric chest rise and fall

## 2019-10-04 NOTE — NURSING NOTE
Spoke with daughter at bedside with concern of her mother not being able to gain IV access since arrival from [facility] last night. Her main concern is not being able to confirm when staff first started trying to get IV access. She is anxious about getting IVF started and IV antibiotics infused. I discussed with her the steps and efforts so far, including IV ultra sound attempts and PICC RN unable to place PICC. On schedule for deep line placement at this time. OR staff in room to pick her up for procedure.

## 2019-10-04 NOTE — NURSING NOTE
Called [facility] ambulance , will call back with truck per dispatcher.

## 2019-10-05 LAB
ANION GAP SERPL CALCULATED.3IONS-SCNC: 9 MMOL/L (ref 5–15)
BACTERIA SPEC AEROBE CULT: NO GROWTH
BASOPHILS # BLD AUTO: 0 10*3/MM3 (ref 0–0.2)
BASOPHILS NFR BLD AUTO: 0 % (ref 0–1.5)
BUN BLD-MCNC: 23 MG/DL (ref 8–23)
BUN/CREAT SERPL: 33.3 (ref 7–25)
CALCIUM SPEC-SCNC: 9 MG/DL (ref 8.6–10.5)
CHLORIDE SERPL-SCNC: 109 MMOL/L (ref 98–107)
CO2 SERPL-SCNC: 21 MMOL/L (ref 22–29)
CREAT BLD-MCNC: 0.69 MG/DL (ref 0.57–1)
DEPRECATED RDW RBC AUTO: 55 FL (ref 37–54)
EOSINOPHIL # BLD AUTO: 0 10*3/MM3 (ref 0–0.4)
EOSINOPHIL NFR BLD AUTO: 0 % (ref 0.3–6.2)
ERYTHROCYTE [DISTWIDTH] IN BLOOD BY AUTOMATED COUNT: 15.1 % (ref 12.3–15.4)
GFR SERPL CREATININE-BSD FRML MDRD: 81 ML/MIN/1.73
GLUCOSE BLD-MCNC: 187 MG/DL (ref 65–99)
HCT VFR BLD AUTO: 36.8 % (ref 34–46.6)
HGB BLD-MCNC: 12 G/DL (ref 12–15.9)
IMM GRANULOCYTES # BLD AUTO: 0.01 10*3/MM3 (ref 0–0.05)
IMM GRANULOCYTES NFR BLD AUTO: 0.2 % (ref 0–0.5)
LYMPHOCYTES # BLD AUTO: 0.68 10*3/MM3 (ref 0.7–3.1)
LYMPHOCYTES NFR BLD AUTO: 16.1 % (ref 19.6–45.3)
MCH RBC QN AUTO: 32.1 PG (ref 26.6–33)
MCHC RBC AUTO-ENTMCNC: 32.6 G/DL (ref 31.5–35.7)
MCV RBC AUTO: 98.4 FL (ref 79–97)
MONOCYTES # BLD AUTO: 0.13 10*3/MM3 (ref 0.1–0.9)
MONOCYTES NFR BLD AUTO: 3.1 % (ref 5–12)
NEUTROPHILS # BLD AUTO: 3.4 10*3/MM3 (ref 1.7–7)
NEUTROPHILS NFR BLD AUTO: 80.6 % (ref 42.7–76)
NRBC BLD AUTO-RTO: 0 /100 WBC (ref 0–0.2)
PLATELET # BLD AUTO: 128 10*3/MM3 (ref 140–450)
PMV BLD AUTO: 10.6 FL (ref 6–12)
POTASSIUM BLD-SCNC: 4.3 MMOL/L (ref 3.5–5.2)
RBC # BLD AUTO: 3.74 10*6/MM3 (ref 3.77–5.28)
SODIUM BLD-SCNC: 139 MMOL/L (ref 136–145)
WBC NRBC COR # BLD: 4.22 10*3/MM3 (ref 3.4–10.8)

## 2019-10-05 PROCEDURE — 86708 HEPATITIS A ANTIBODY: CPT | Performed by: PHYSICIAN ASSISTANT

## 2019-10-05 PROCEDURE — 25010000002 PIPERACILLIN SOD-TAZOBACTAM PER 1 G: Performed by: NURSE PRACTITIONER

## 2019-10-05 PROCEDURE — 80048 BASIC METABOLIC PNL TOTAL CA: CPT | Performed by: INTERNAL MEDICINE

## 2019-10-05 PROCEDURE — 85025 COMPLETE CBC W/AUTO DIFF WBC: CPT | Performed by: INTERNAL MEDICINE

## 2019-10-05 PROCEDURE — 25010000002 ENOXAPARIN PER 10 MG: Performed by: INTERNAL MEDICINE

## 2019-10-05 PROCEDURE — 99233 SBSQ HOSP IP/OBS HIGH 50: CPT | Performed by: INTERNAL MEDICINE

## 2019-10-05 RX ADMIN — PIPERACILLIN AND TAZOBACTAM 3.38 G: 3; .375 INJECTION, POWDER, LYOPHILIZED, FOR SOLUTION INTRAVENOUS at 02:58

## 2019-10-05 RX ADMIN — SODIUM CHLORIDE 100 ML/HR: 9 INJECTION, SOLUTION INTRAVENOUS at 08:42

## 2019-10-05 RX ADMIN — PIPERACILLIN AND TAZOBACTAM 3.38 G: 3; .375 INJECTION, POWDER, LYOPHILIZED, FOR SOLUTION INTRAVENOUS at 10:30

## 2019-10-05 RX ADMIN — ENOXAPARIN SODIUM 40 MG: 40 INJECTION SUBCUTANEOUS at 13:45

## 2019-10-05 RX ADMIN — PIPERACILLIN AND TAZOBACTAM 3.38 G: 3; .375 INJECTION, POWDER, LYOPHILIZED, FOR SOLUTION INTRAVENOUS at 17:40

## 2019-10-05 NOTE — PLAN OF CARE
Problem: Patient Care Overview  Goal: Plan of Care Review  Outcome: Ongoing (interventions implemented as appropriate)   10/05/19 0642   Coping/Psychosocial   Plan of Care Reviewed With patient;daughter   Plan of Care Review   Progress no change   OTHER   Outcome Summary Rested well through the night. EJ became occluded. Now has 20G to right wrist infusing fluids/abx. No complaints of pain besides headache at start of shift. NSR. Will monitor.        Problem: Fall Risk (Adult)  Goal: Absence of Fall  Outcome: Ongoing (interventions implemented as appropriate)   10/05/19 0642   Fall Risk (Adult)   Absence of Fall making progress toward outcome       Problem: Pancreatitis, Acute/Chronic (Adult)  Goal: Signs and Symptoms of Listed Potential Problems Will be Absent, Minimized or Managed (Pancreatitis, Acute/Chronic)  Outcome: Ongoing (interventions implemented as appropriate)   10/05/19 0642   Goal/Outcome Evaluation   Problems Assessed (Pancreatitis) all   Problems Present (Pancreatitis) situational response

## 2019-10-05 NOTE — PROGRESS NOTES
"Patient Name:  Janeth Mitchell  YOB: 1935  [id number]    Surgery Progress Note    Date of visit: 10/5/2019    Subjective   Subjective: Reports no pain or emesis overnight.  Vitals within normal limits.  White blood cell count normal this morning.  Tolerating liquid diet upon presentation.         Objective     Objective:     /69 (BP Location: Left arm, Patient Position: Sitting)   Pulse 59   Temp 97.5 °F (36.4 °C) (Oral)   Resp 18   Ht 152.4 cm (60\")   Wt 81 kg (178 lb 8 oz)   SpO2 94%   BMI 34.86 kg/m²     Intake/Output Summary (Last 24 hours) at 10/5/2019 0929  Last data filed at 10/5/2019 0559  Gross per 24 hour   Intake 707.27 ml   Output --   Net 707.27 ml       CV:  Rhythm  regular and rate regular   L:  Clear  to auscultation bilaterally   Abd:  Bowel sounds positive , soft, nontender  Ext:  No cyanosis, clubbing, edema    Labs that are back at this time have been reviewed.  White blood cell count 4.2 this morning.  BMP unremarkable except for glucose of 187       Assessment/Plan     Assessment/ Plan:    Hospital Problem List     * (Principal) Gallstone pancreatitis  Patient reports no recurrent abdominal pain or vomiting overnight.  Vitals stable within normal limits.  White blood cell count normal.  Agree with trial of liquid diet and advancing as tolerated  We will plan for elective cholecystectomy after repeat ERCP is complete  We will continue to follow this patient with you    Cirrhosis of liver (CMS/HCC)    Pseudocyst of pancreas    Essential hypertension        Arthritis    UTI (urinary tract infection), bacterial    Syncope              Bari Rodriguez MD  10/5/2019  9:29 AM    I have personally performed the key portions of the history and physical exam, and I have edited the above note to better reflect my complete history and physical exam.    Feeling better. Tolerating some PO.    CV:  Rhythm  regular and rate regular   L:  Clear  to auscultation bilaterally   Abd: "  Bowel sounds positive , soft, less tender  Ext:  No cyanosis, clubbing, edema    Labs: Labs reviewed       Assessment/ Plan:    Pancreatitis- Resolving. Plan is for repeat ERCP in 1 week. Will therefore plan for elective CCY as an outpatient in 4-6 weeks after biliary system definitively addressed. Advance diet as tolerated, with plans to discharge home when stable, and outpatient follow up with me in 3 weeks.    Problem List Items Addressed This Visit     None              Chandana Hernandez MD  10:36 AM

## 2019-10-05 NOTE — PROGRESS NOTES
[facility] Medicine Services  PROGRESS NOTE    Patient Name: Janeth Mitchell  : 1935  MRN: [id number]    Date of Admission: 10/4/2019  Primary Care Physician: Alexx Tafoya MD    Subjective   Subjective     CC:  Gallstone pancreatitis     HPI:  Doing better. Asking to get EJ out. Daughter at bedside. Reports no abdominal pain. Eating well.     Review of Systems  Gen- No fevers, chills  CV- No chest pain, palpitations  Resp- No cough, dyspnea  GI- No N/V/D, abd pain      All other systems reviewed and negative.     Objective   Objective     Vital Signs:   Temp:  [97.5 °F (36.4 °C)-98.7 °F (37.1 °C)] 97.5 °F (36.4 °C)  Heart Rate:  [59-88] 59  Resp:  [16-18] 18  BP: (133-183)/(63-87) 150/69        Physical Exam:  Constitutional: No acute distress, awake, alert  HENT: NCAT, mucous membranes moist, poor dentition, EJ in place with tape on face   Respiratory: Clear to auscultation bilaterally, respiratory effort normal   Cardiovascular: RRR, no murmurs, rubs, or gallops, palpable pedal pulses bilaterally  Gastrointestinal: Positive bowel sounds, soft, nontender, nondistended  Musculoskeletal: No bilateral ankle edema  Psychiatric: Appropriate affect, cooperative  Neurologic: Oriented x 3, strength symmetric in all extremities, Cranial Nerves grossly intact to confrontation, speech clear  Skin: No rashes      Results Reviewed:    Results from last 7 days   Lab Units 10/05/19  0356 10/04/19  0512 10/04/19  0511 10/03/19  0602 10/03/19  0102   WBC 10*3/mm3 4.22  --  6.38 11.82* 14.54*   HEMOGLOBIN g/dL 12.0  --  12.6 13.3 13.7   HEMATOCRIT % 36.8  --  39.0 40.6 41.8   PLATELETS 10*3/mm3 128*  --  155 163 171   INR   --  1.28*  --   --  1.14*     Results from last 7 days   Lab Units 10/05/19  0356 10/04/19  0511 10/03/19  0602 10/03/19  0102   SODIUM mmol/L 139 141 139 140   POTASSIUM mmol/L 4.3 4.0 4.4 3.7   CHLORIDE mmol/L 109* 108* 104 104   CO2 mmol/L 21.0* 21.0* 20.7* 21.6*    BUN mg/dL 23 17 19 17   CREATININE mg/dL 0.69 0.83 1.20* 1.16*   GLUCOSE mg/dL 187* 79 117* 106*   CALCIUM mg/dL 9.0 9.1 9.2 9.4   ALT (SGPT) U/L  --  38* 41* 38*   AST (SGOT) U/L  --  79* 93* 95*   TROPONIN T ng/mL  --   --   --  <0.010     Estimated Creatinine Clearance: 51.1 mL/min (by C-G formula based on SCr of 0.69 mg/dL).    Microbiology Results Abnormal     Procedure Component Value - Date/Time    Blood Culture - Blood, Arm, Right [[id number]] Collected:  10/04/19 0511    Lab Status:  Preliminary result Specimen:  Blood from Arm, Right Updated:  10/05/19 0615     Blood Culture No growth at 24 hours    Blood Culture - Blood, Arm, Right [[id number]] Collected:  10/04/19 0511    Lab Status:  Preliminary result Specimen:  Blood from Arm, Right Updated:  10/05/19 0615     Blood Culture No growth at 24 hours    Narrative:       Aerobic bottle only          Imaging Results (last 24 hours)     Procedure Component Value Units Date/Time    FL ERCP pancreatic and biliary ducts [[id number]] Collected:  10/04/19 1705     Updated:  10/04/19 1716    Narrative:       EXAMINATION: RETROGRADE CHOLANGIOGRAM - 10/04/2019     HISTORY: Right upper quadrant pain.     FINDINGS: 16 seconds of fluoroscopic time was used to assist in this  procedure. A single image was obtained with no evidence of injected  contrast.       Impression:       Fluoroscopy and a single intraprocedural image was obtained  to assist in this procedure.     DICTATED:   10/04/2019  EDITED/ls :   10/04/2019      This report was finalized on 10/4/2019 5:13 PM by Dr. Rivas Canseco MD.                  I have reviewed the medications:  Scheduled Meds:  piperacillin-tazobactam 3.375 g Intravenous Once   piperacillin-tazobactam 3.375 g Intravenous Q8H     Continuous Infusions:  sodium chloride 100 mL/hr Last Rate: 100 mL/hr (10/05/19 0842)     PRN Meds:.•  bisacodyl  •  magnesium hydroxide  •  Morphine **AND** naloxone  •  ondansetron **OR**  ondansetron      Assessment/Plan   Assessment / Plan     Active Hospital Problems    Diagnosis  POA   • **Gallstone pancreatitis [K85.10]  Yes   • Cirrhosis of liver (CMS/HCC) [K74.60]  Yes   • Pseudocyst of pancreas [K86.3]  Yes   • Essential hypertension [I10]  Yes   • Arthritis [M19.90]  Yes   • UTI (urinary tract infection), bacterial [N39.0, A49.9]  Yes   • Syncope [R55]  Yes      Resolved Hospital Problems   No resolved problems to display.        Brief Hospital Course to date:  Janeth Mitchell is a 84 y.o. female transferred from [facility] for acute gallstone pancreatitis, also found to have new diagnosis of liver cirrhosis this admission.       Gallstone Pancreatitis   Sepsis, resolving  Choledocholithiasis  ---patient underwent ERCP with GI 10/4, unable to cannulate CBD, placed stent in pancreatic duct, plan for repeat ERCP in 1 week with GI  ---continue zosyn, IVF, follow cultures  ---surgery has evaluated and planning for interim CCY after pancreatitis cooled down  ---advance diet as tolerated     Liver cirrhosis with mild ascites   ---GI has evaluated and following  ---likely cirrhosis is secondary to DHILLON, hepatitis panel pending, further workup to be done as outpatient  ---LFTs improving, CMP in AM     Cystitis  ---UA from OSH + UTI, culture from OSH with Klebsiella, continue zosyn    Syncope  ---prior to presentation, likely vasovagal, CT H/carotid duplex negative  HTN  ---monitor, on no home medications    DVT Prophylaxis:  lovenox    Disposition: I expect the patient to be discharged pending improvement     CODE STATUS:   Code Status and Medical Interventions:   Ordered at: 10/04/19 0330     Level Of Support Discussed With:    Patient     Code Status:    CPR     Medical Interventions (Level of Support Prior to Arrest):    Full         Electronically signed by Obdulia Umana MD, 10/05/19, 12:12 PM.

## 2019-10-06 VITALS
RESPIRATION RATE: 18 BRPM | TEMPERATURE: 97.8 F | HEART RATE: 56 BPM | BODY MASS INDEX: 34.83 KG/M2 | OXYGEN SATURATION: 96 % | DIASTOLIC BLOOD PRESSURE: 63 MMHG | WEIGHT: 177.4 LBS | SYSTOLIC BLOOD PRESSURE: 139 MMHG | HEIGHT: 60 IN

## 2019-10-06 LAB
ALBUMIN SERPL-MCNC: 2.4 G/DL (ref 3.5–5.2)
ALBUMIN/GLOB SERPL: 0.8 G/DL
ALP SERPL-CCNC: 89 U/L (ref 39–117)
ALT SERPL W P-5'-P-CCNC: 25 U/L (ref 1–33)
ANION GAP SERPL CALCULATED.3IONS-SCNC: 8 MMOL/L (ref 5–15)
AST SERPL-CCNC: 40 U/L (ref 1–32)
BASOPHILS # BLD AUTO: 0.01 10*3/MM3 (ref 0–0.2)
BASOPHILS NFR BLD AUTO: 0.2 % (ref 0–1.5)
BILIRUB SERPL-MCNC: 1.1 MG/DL (ref 0.2–1.2)
BUN BLD-MCNC: 15 MG/DL (ref 8–23)
BUN/CREAT SERPL: 25 (ref 7–25)
CALCIUM SPEC-SCNC: 8.8 MG/DL (ref 8.6–10.5)
CHLORIDE SERPL-SCNC: 112 MMOL/L (ref 98–107)
CO2 SERPL-SCNC: 21 MMOL/L (ref 22–29)
CREAT BLD-MCNC: 0.6 MG/DL (ref 0.57–1)
DEPRECATED RDW RBC AUTO: 59.2 FL (ref 37–54)
EOSINOPHIL # BLD AUTO: 0.15 10*3/MM3 (ref 0–0.4)
EOSINOPHIL NFR BLD AUTO: 2.3 % (ref 0.3–6.2)
ERYTHROCYTE [DISTWIDTH] IN BLOOD BY AUTOMATED COUNT: 15.8 % (ref 12.3–15.4)
GFR SERPL CREATININE-BSD FRML MDRD: 96 ML/MIN/1.73
GLOBULIN UR ELPH-MCNC: 2.9 GM/DL
GLUCOSE BLD-MCNC: 121 MG/DL (ref 65–99)
HAV AB SER QL IA: POSITIVE
HCT VFR BLD AUTO: 34.7 % (ref 34–46.6)
HGB BLD-MCNC: 11.2 G/DL (ref 12–15.9)
IMM GRANULOCYTES # BLD AUTO: 0.04 10*3/MM3 (ref 0–0.05)
IMM GRANULOCYTES NFR BLD AUTO: 0.6 % (ref 0–0.5)
LYMPHOCYTES # BLD AUTO: 0.89 10*3/MM3 (ref 0.7–3.1)
LYMPHOCYTES NFR BLD AUTO: 13.7 % (ref 19.6–45.3)
MCH RBC QN AUTO: 32.7 PG (ref 26.6–33)
MCHC RBC AUTO-ENTMCNC: 32.3 G/DL (ref 31.5–35.7)
MCV RBC AUTO: 101.5 FL (ref 79–97)
MONOCYTES # BLD AUTO: 0.42 10*3/MM3 (ref 0.1–0.9)
MONOCYTES NFR BLD AUTO: 6.5 % (ref 5–12)
NEUTROPHILS # BLD AUTO: 5 10*3/MM3 (ref 1.7–7)
NEUTROPHILS NFR BLD AUTO: 76.7 % (ref 42.7–76)
NRBC BLD AUTO-RTO: 0 /100 WBC (ref 0–0.2)
PLATELET # BLD AUTO: 128 10*3/MM3 (ref 140–450)
PMV BLD AUTO: 10.9 FL (ref 6–12)
POTASSIUM BLD-SCNC: 4.1 MMOL/L (ref 3.5–5.2)
PROT SERPL-MCNC: 5.3 G/DL (ref 6–8.5)
RBC # BLD AUTO: 3.42 10*6/MM3 (ref 3.77–5.28)
SODIUM BLD-SCNC: 141 MMOL/L (ref 136–145)
WBC NRBC COR # BLD: 6.51 10*3/MM3 (ref 3.4–10.8)

## 2019-10-06 PROCEDURE — 80053 COMPREHEN METABOLIC PANEL: CPT | Performed by: INTERNAL MEDICINE

## 2019-10-06 PROCEDURE — 25010000002 ENOXAPARIN PER 10 MG: Performed by: INTERNAL MEDICINE

## 2019-10-06 PROCEDURE — 85025 COMPLETE CBC W/AUTO DIFF WBC: CPT | Performed by: INTERNAL MEDICINE

## 2019-10-06 PROCEDURE — 99239 HOSP IP/OBS DSCHRG MGMT >30: CPT | Performed by: INTERNAL MEDICINE

## 2019-10-06 PROCEDURE — 25010000002 PIPERACILLIN SOD-TAZOBACTAM PER 1 G: Performed by: NURSE PRACTITIONER

## 2019-10-06 RX ORDER — L.ACID,PARA/B.BIFIDUM/S.THERM 8B CELL
1 CAPSULE ORAL DAILY
Qty: 7 CAPSULE | Refills: 0 | Status: SHIPPED | OUTPATIENT
Start: 2019-10-06 | End: 2019-10-13

## 2019-10-06 RX ORDER — AMOXICILLIN AND CLAVULANATE POTASSIUM 875; 125 MG/1; MG/1
1 TABLET, FILM COATED ORAL 2 TIMES DAILY
Qty: 14 TABLET | Refills: 0 | Status: SHIPPED | OUTPATIENT
Start: 2019-10-06 | End: 2019-10-16 | Stop reason: HOSPADM

## 2019-10-06 RX ADMIN — ENOXAPARIN SODIUM 40 MG: 40 INJECTION SUBCUTANEOUS at 13:55

## 2019-10-06 RX ADMIN — PIPERACILLIN AND TAZOBACTAM 3.38 G: 3; .375 INJECTION, POWDER, LYOPHILIZED, FOR SOLUTION INTRAVENOUS at 02:29

## 2019-10-06 RX ADMIN — PIPERACILLIN AND TAZOBACTAM 3.38 G: 3; .375 INJECTION, POWDER, LYOPHILIZED, FOR SOLUTION INTRAVENOUS at 10:51

## 2019-10-06 NOTE — PROGRESS NOTES
"Patient Name:  Janeth Mitchell  YOB: 1935  [id number]    Surgery Progress Note    Date of visit: 10/6/2019    Subjective   Subjective: Feels much better, wants to go home. Tolerating PO.         Objective     Objective:     /63 (BP Location: Right arm, Patient Position: Lying)   Pulse 56   Temp 97.8 °F (36.6 °C) (Oral)   Resp 18   Ht 152.4 cm (60\")   Wt 80.5 kg (177 lb 6.4 oz)   SpO2 94%   BMI 34.65 kg/m²     Intake/Output Summary (Last 24 hours) at 10/6/2019 1212  Last data filed at 10/6/2019 1051  Gross per 24 hour   Intake 2038 ml   Output --   Net 2038 ml       CV:  Rhythm  regular and rate regular   L:  Clear  to auscultation bilaterally   Abd:  Bowel sounds positive , soft, nontender.  Ext:  No cyanosis, clubbing, edema    Recent labs that are back at this time have been reviewed.        Assessment/Plan     Assessment/ Plan:    Hospital Problem List     * (Principal) Gallstone pancreatitis - Doing better. OK to go home from my standpoint. RTC 2 weeks with me to discuss elective CCY, after repeat ERCP    Cirrhosis of liver (CMS/HCC)    Pseudocyst of pancreas    Essential hypertension        Arthritis    UTI (urinary tract infection), bacterial    Syncope              Chandana Hernandez MD  10/6/2019  12:12 PM      "

## 2019-10-06 NOTE — PLAN OF CARE
Problem: Patient Care Overview  Goal: Plan of Care Review  Outcome: Ongoing (interventions implemented as appropriate)   10/06/19 0647   Coping/Psychosocial   Plan of Care Reviewed With patient   Plan of Care Review   Progress improving   OTHER   Outcome Summary Pt rested through the night with no complaints of pain. IVF infusing. Family at bedside. Will monitor.        Problem: Fall Risk (Adult)  Goal: Identify Related Risk Factors and Signs and Symptoms  Outcome: Ongoing (interventions implemented as appropriate)   10/06/19 0647   Fall Risk (Adult)   Related Risk Factors (Fall Risk) age-related changes;history of falls;environment unfamiliar;bladder function altered   Signs and Symptoms (Fall Risk) presence of risk factors     Goal: Absence of Fall  Outcome: Ongoing (interventions implemented as appropriate)   10/06/19 0647   Fall Risk (Adult)   Absence of Fall making progress toward outcome       Problem: Pancreatitis, Acute/Chronic (Adult)  Goal: Signs and Symptoms of Listed Potential Problems Will be Absent, Minimized or Managed (Pancreatitis, Acute/Chronic)  Outcome: Ongoing (interventions implemented as appropriate)   10/06/19 0647   Goal/Outcome Evaluation   Problems Assessed (Pancreatitis) all   Problems Present (Pancreatitis) situational response

## 2019-10-06 NOTE — DISCHARGE SUMMARY
[facility] Medicine Services  DISCHARGE SUMMARY    Patient Name: Janeth Mitchell  : 1935  MRN: [id number]    Date of Admission: 10/4/2019  Date of Discharge:  10/6/2019  Primary Care Physician: Alexx Tafoya MD    Consults     Date and Time Order Name Status Description    10/4/2019 0417 Inpatient General Surgery Consult Completed     10/4/2019 0330 Inpatient Gastroenterology Consult Completed           Hospital Course     Presenting Problem:   Gallstone pancreatitis [K85.10]    Active Hospital Problems    Diagnosis  POA   • **Gallstone pancreatitis [K85.10]  Yes   • Cirrhosis of liver (CMS/HCC) [K74.60]  Yes   • Pseudocyst of pancreas [K86.3]  Yes   • Essential hypertension [I10]  Yes   • Arthritis [M19.90]  Yes   • UTI (urinary tract infection), bacterial [N39.0, A49.9]  Yes   • Syncope [R55]  Yes      Resolved Hospital Problems   No resolved problems to display.          Hospital Course:  Janeth Mitchell is a 84 y.o. female transferred from [facility] [place] for acute gallstone pancreatitis, also found to have new diagnosis of liver cirrhosis this admission.         Gallstone Pancreatitis   Sepsis, resolving  Choledocholithiasis  ---patient underwent ERCP with GI 10/4, unable to cannulate CBD, placed stent in pancreatic duct, plan for repeat ERCP in 1 week with GI  ---continued on zosyn as inpatient, was transitioned to Augmentin at discharge to continue through follow up with GI for repeat ERCP   ---surgery has evaluated and planning for interim CCY after pancreatitis cooled down, will follow up in 3 weeks for planned CCY in 4-6 weeks as outpatient   ---advance diet as tolerated      Liver cirrhosis with mild ascites   ---GI has evaluated and following  ---likely cirrhosis is secondary to DHILLON, hepatitis panel done and was remarkable for immunity to HepB, previous exposure to HepA, further workup to be done as outpatient  ---LFTs improved during admission  ---AFP normal       Cystitis  ---UA from OSH + UTI, culture from OSH with Klebsiella, continued on zosyn as intpatient, transitioned to Augmentin on discharge, sensitivities reviewed    Syncope  ---prior to presentation, likely vasovagal, CT H/carotid duplex negative  HTN  ---monitor, on no home medications, continue close outpatient follow up with PCP     Discharge Follow Up Recommendations for labs/diagnostics:  PCP in 1-2 weeks  GI, Dr Hercules in 1 week for repeat ERCP  GI for ongoing follow up for new diagnosis of cirrhosis  Surgery, Dr Hernandez, in 3 weeks for discussion of outpatient CCY    Day of Discharge     HPI:   Feeling well. Tolerating diet well. No abdominal pain. Wants to go home today. Family at bedside. No objections to this plan as patient has adequate support at home.     Review of Systems  Gen- No fevers, chills  CV- No chest pain, palpitations  Resp- No cough, dyspnea  GI- No N/V/D, abd pain        Otherwise ROS is negative except as mentioned in the HPI.    Vital Signs:   Temp:  [97.8 °F (36.6 °C)] 97.8 °F (36.6 °C)  Heart Rate:  [56-60] 56  Resp:  [18] 18  BP: (139-146)/(63-70) 139/63     Physical Exam:  GEN- no acute distress noted, resting in bed, awake  HEENT- atraumatic, normocephlic, eomi  NECK- supple, trachea midline, no masses  RESP: ctab, normal effort  CV: no murmurs, s1/s2, rrr  GI : soft, nd, nt  MSK: no edema noted, spontaneous movement of all extremities  NEURO: alert, oriented, no focal deficits  SKIN: no rashes  PSYCH: appropriate mood and affect       Pertinent  and/or Most Recent Results     Results from last 7 days   Lab Units 10/06/19  0701 10/05/19  0356 10/04/19  0511 10/03/19  0602 10/03/19  0102   WBC 10*3/mm3 6.51 4.22 6.38 11.82* 14.54*   HEMOGLOBIN g/dL 11.2* 12.0 12.6 13.3 13.7   HEMATOCRIT % 34.7 36.8 39.0 40.6 41.8   PLATELETS 10*3/mm3 128* 128* 155 163 171   SODIUM mmol/L 141 139 141 139 140   POTASSIUM mmol/L 4.1 4.3 4.0 4.4 3.7   CHLORIDE mmol/L 112* 109* 108* 104 104   CO2  mmol/L 21.0* 21.0* 21.0* 20.7* 21.6*   BUN mg/dL 15 23 17 19 17   CREATININE mg/dL 0.60 0.69 0.83 1.20* 1.16*   GLUCOSE mg/dL 121* 187* 79 117* 106*   CALCIUM mg/dL 8.8 9.0 9.1 9.2 9.4     Results from last 7 days   Lab Units 10/06/19  0701 10/04/19  0512 10/04/19  0511 10/03/19  0602 10/03/19  0102   BILIRUBIN mg/dL 1.1  --  1.2 0.9 1.1   ALK PHOS U/L 89  --  127* 137* 145*   ALT (SGPT) U/L 25  --  38* 41* 38*   AST (SGOT) U/L 40*  --  79* 93* 95*   PROTIME Seconds  --  15.4*  --   --  15.2   INR   --  1.28*  --   --  1.14*   APTT seconds  --   --   --   --  29.4     Results from last 7 days   Lab Units 10/03/19  0602   CHOLESTEROL mg/dL 78   TRIGLYCERIDES mg/dL 40  42   HDL CHOL mg/dL 35*     Results from last 7 days   Lab Units 10/04/19  0511 10/03/19  1224 10/03/19  0602 10/03/19  0102   TROPONIN T ng/mL  --   --   --  <0.010   LACTATE mmol/L 2.1* 1.2 2.9* 3.7*       Brief Urine Lab Results  (Last result in the past 365 days)      Color   Clarity   Blood   Leuk Est   Nitrite   Protein   CREAT   Urine HCG        10/04/19 0631 Yellow Clear Negative Moderate (2+) Negative Negative               Microbiology Results Abnormal     Procedure Component Value - Date/Time    Blood Culture - Blood, Arm, Right [[id number]] Collected:  10/04/19 0511    Lab Status:  Preliminary result Specimen:  Blood from Arm, Right Updated:  10/06/19 0615     Blood Culture No growth at 2 days    Blood Culture - Blood, Arm, Right [[id number]] Collected:  10/04/19 0511    Lab Status:  Preliminary result Specimen:  Blood from Arm, Right Updated:  10/06/19 0615     Blood Culture No growth at 2 days    Narrative:       Aerobic bottle only    Urine Culture - Urine, Urine, Clean Catch [[id number]]  (Normal) Collected:  10/04/19 0631    Lab Status:  Final result Specimen:  Urine, Clean Catch Updated:  10/05/19 1632     Urine Culture No growth          Imaging Results (all)     Procedure Component Value Units Date/Time    FL ERCP pancreatic and  biliary ducts [[id number]] Collected:  10/04/19 1705     Updated:  10/04/19 1716    Narrative:       EXAMINATION: RETROGRADE CHOLANGIOGRAM - 10/04/2019     HISTORY: Right upper quadrant pain.     FINDINGS: 16 seconds of fluoroscopic time was used to assist in this  procedure. A single image was obtained with no evidence of injected  contrast.       Impression:       Fluoroscopy and a single intraprocedural image was obtained  to assist in this procedure.     DICTATED:   10/04/2019  EDITED/ls :   10/04/2019      This report was finalized on 10/4/2019 5:13 PM by Dr. Rivas Canseco MD.       XR Chest 1 View [[id number]] Collected:  10/04/19 0256     Updated:  10/04/19 0258    Narrative:       CR Chest 1 Vw    INDICATION:   Evaluate pleural effusions     COMPARISON:    10/2/2019    FINDINGS:  Single portable AP view(s) of the chest.        The heart and mediastinal contours are normal. The lungs are clear. No pneumothorax or pleural effusion.       Impression:       No acute cardiopulmonary findings.. No pleural effusions are visible    Signer Name: Ricki Perera MD   Signed: 10/4/2019 2:56 AM   Workstation Name: [email or web address]-    [facility]                          Order Current Status    Blood Culture - Blood, Arm, Right Preliminary result    Blood Culture - Blood, Arm, Right Preliminary result        Discharge Details        Discharge Medications      New Medications      Instructions Start Date   amoxicillin-clavulanate 875-125 MG per tablet  Commonly known as:  AUGMENTIN   1 tablet, Oral, 2 Times Daily         Continue These Medications      Instructions Start Date   famotidine 10 MG/ML solution injection  Commonly known as:  PEPCID   20 mg, Intravenous, Daily      lactobacillus acidophilus capsule capsule   1 capsule, Oral, Daily      ondansetron 4 MG/2ML injection  Commonly known as:  ZOFRAN   4 mg, Intravenous, Every 6 Hours PRN         Stop These Medications    cefTRIAXone  Commonly known as:   ROCEPHIN     heparin (porcine) 5000 UNIT/ML injection     lidocaine 5 %  Commonly known as:  LIDODERM     magnesium sulfate 2 GM/50ML infusion     magnesium sulfate 4 GM/100ML infusion     magnesium sulfate in D5W 1g/100mL (PREMIX) 1-5 GM/100ML-% IVPB     metroNIDAZOLE 5-0.79 MG/ML-% IVPB  Commonly known as:  FLAGYL     morphine 2 MG/ML injection     naloxone 0.4 MG/ML injection  Commonly known as:  NARCAN     promethazine 12.5 MG tablet  Commonly known as:  PHENERGAN     sodium chloride 0.9 % solution     sodium chloride 0.9 % solution 50 mL with promethazine 25 MG/ML solution 12.5 mg            Allergies   Allergen Reactions   • Sulfa Antibiotics Angioedema     States she cant remember         Discharge Disposition:  Home or Self Care    Diet:  Hospital:  Diet Order   Procedures   • Diet Full Liquid     Discharge:     Discharge Activity:       As tolerated    CODE STATUS:    Code Status and Medical Interventions:   Ordered at: 10/04/19 0330     Level Of Support Discussed With:    Patient     Code Status:    CPR     Medical Interventions (Level of Support Prior to Arrest):    Full         No future appointments.    Additional Instructions for the Follow-ups that You Need to Schedule     Discharge Follow-up with PCP   As directed       Currently Documented PCP:    Alexx Tafoya MD    PCP Phone Number:    [phone]     Follow Up Details:  1-2 weeks         Discharge Follow-up with Specialty: Dr. Hernandez; 2 Weeks   As directed      Specialty:  Dr. Hernandez    Follow Up:  2 Weeks         Discharge Follow-up with Specified Provider: Diomedes MAGAÑA for repeat ERCP, will also need ongoing GI f/u for new diagnosis of cirrhosis; 1 Week   As directed      To:  Dimoedes MAGAÑA for repeat ERCP, will also need ongoing GI f/u for new diagnosis of cirrhosis    Follow Up:  1 Week         Discharge Follow-up with Specified Provider: Dr Hernandez in 3 weeks to discuss outpatient CCY   As directed      To:  Dr Hernandez in 3 weeks  to discuss outpatient CCY               Time Spent on Discharge:  40 minutes    Electronically signed by Obdulia Umana MD, 10/06/19, 11:01 AM.

## 2019-10-07 ENCOUNTER — READMISSION MANAGEMENT (OUTPATIENT)
Dept: CALL CENTER | Facility: HOSPITAL | Age: 84
End: 2019-10-07

## 2019-10-07 NOTE — OUTREACH NOTE
Prep Survey      Responses   Facility patient discharged from?  [facility]   Is patient eligible?  Yes   Discharge diagnosis  Gallstone pancreatitis, Cirrhosis of liver, pseudocyst of pancrease, essential HTN, arthritis, UTI, syncope, s/p ERCP with stent placement   Does the patient have one of the following disease processes/diagnoses(primary or secondary)?  Other   Does the patient have Home health ordered?  No   Is there a DME ordered?  No   Comments regarding appointments  See AVS   Prep survey completed?  Yes          Cassandra Rene RN

## 2019-10-08 LAB
BACTERIA SPEC AEROBE CULT: NORMAL
BACTERIA SPEC AEROBE CULT: NORMAL

## 2019-10-09 LAB
BACTERIA SPEC AEROBE CULT: NORMAL
BACTERIA SPEC AEROBE CULT: NORMAL

## 2019-10-10 ENCOUNTER — READMISSION MANAGEMENT (OUTPATIENT)
Dept: CALL CENTER | Facility: HOSPITAL | Age: 84
End: 2019-10-10

## 2019-10-10 ENCOUNTER — PREP FOR SURGERY (OUTPATIENT)
Dept: OTHER | Facility: HOSPITAL | Age: 84
End: 2019-10-10

## 2019-10-10 DIAGNOSIS — K83.1 OBSTRUCTION OF BILE DUCT: Primary | ICD-10-CM

## 2019-10-10 RX ORDER — SODIUM CHLORIDE 0.9 % (FLUSH) 0.9 %
10 SYRINGE (ML) INJECTION AS NEEDED
Status: CANCELLED | OUTPATIENT
Start: 2019-10-10

## 2019-10-10 RX ORDER — SODIUM CHLORIDE, SODIUM LACTATE, POTASSIUM CHLORIDE, CALCIUM CHLORIDE 600; 310; 30; 20 MG/100ML; MG/100ML; MG/100ML; MG/100ML
1000 INJECTION, SOLUTION INTRAVENOUS CONTINUOUS
Status: CANCELLED | OUTPATIENT
Start: 2019-10-10

## 2019-10-10 RX ORDER — SODIUM CHLORIDE 0.9 % (FLUSH) 0.9 %
3 SYRINGE (ML) INJECTION EVERY 12 HOURS SCHEDULED
Status: CANCELLED | OUTPATIENT
Start: 2019-10-10

## 2019-10-10 NOTE — OUTREACH NOTE
Medical Week 1 Survey      Responses   Facility patient discharged from?  [place]   Does the patient have one of the following disease processes/diagnoses(primary or secondary)?  Other   Is there a successful TCM telephone encounter documented?  No   Week 1 attempt successful?  Yes   Call start time  0912   Call end time  0917   Discharge diagnosis  Gallstone pancreatitis, Cirrhosis of liver, pseudocyst of pancrease, essential HTN, arthritis, UTI, syncope, s/p ERCP with stent placement   Meds reviewed with patient/caregiver?  Yes   Is the patient having any side effects they believe may be caused by any medication additions or changes?  No   Does the patient have all medications ordered at discharge?  Yes   Is the patient taking all medications as directed (includes completed medication regime)?  Yes   Does the patient have a primary care provider?   Yes   Does the patient have an appointment with their PCP within 7 days of discharge?  No   What is preventing the patient from scheduling follow up appointments within 7 days of discharge?  Unsure of when or with whom   Nursing Interventions  Advised patient to make appointment   Has the patient kept scheduled appointments due by today?  N/A   Comments  Advised to review AVS for appt scheduling, VU.   Has home health visited the patient within 72 hours of discharge?  N/A   Psychosocial issues?  No   Did the patient receive a copy of their discharge instructions?  Yes   Nursing interventions  Reviewed instructions with patient   What is the patient's perception of their health status since discharge?  Improving   Is the patient/caregiver able to teach back signs and symptoms related to disease process for when to call PCP?  Yes   Is the patient/caregiver able to teach back signs and symptoms related to disease process for when to call 911?  Yes   Is the patient/caregiver able to teach back the hierarchy of who to call/visit for symptoms/problems? PCP, Specialist, Home  health nurse, Urgent Care, ED, 911  Yes   Week 1 call completed?  Yes          Cassandra Cross RN

## 2019-10-11 PROBLEM — K83.1 OBSTRUCTION OF BILE DUCT: Status: ACTIVE | Noted: 2019-10-11

## 2019-10-16 ENCOUNTER — HOSPITAL ENCOUNTER (OUTPATIENT)
Facility: HOSPITAL | Age: 84
Setting detail: HOSPITAL OUTPATIENT SURGERY
Discharge: HOME | End: 2019-10-16
Attending: INTERNAL MEDICINE | Admitting: INTERNAL MEDICINE

## 2019-10-16 ENCOUNTER — ANESTHESIA EVENT (OUTPATIENT)
Dept: GASTROENTEROLOGY | Facility: HOSPITAL | Age: 84
End: 2019-10-16

## 2019-10-16 ENCOUNTER — ANESTHESIA (OUTPATIENT)
Dept: GASTROENTEROLOGY | Facility: HOSPITAL | Age: 84
End: 2019-10-16

## 2019-10-16 ENCOUNTER — APPOINTMENT (OUTPATIENT)
Dept: GENERAL RADIOLOGY | Facility: HOSPITAL | Age: 84
End: 2019-10-16

## 2019-10-16 VITALS
TEMPERATURE: 97.6 F | HEIGHT: 60 IN | HEART RATE: 63 BPM | BODY MASS INDEX: 34.83 KG/M2 | DIASTOLIC BLOOD PRESSURE: 77 MMHG | WEIGHT: 177.4 LBS | SYSTOLIC BLOOD PRESSURE: 161 MMHG | RESPIRATION RATE: 16 BRPM | OXYGEN SATURATION: 97 %

## 2019-10-16 DIAGNOSIS — K83.1 OBSTRUCTION OF BILE DUCT: ICD-10-CM

## 2019-10-16 PROCEDURE — 25010000002 IOPAMIDOL 61 % SOLUTION: Performed by: INTERNAL MEDICINE

## 2019-10-16 PROCEDURE — 43275 ERCP REMOVE FORGN BODY DUCT: CPT | Performed by: INTERNAL MEDICINE

## 2019-10-16 PROCEDURE — 74330 X-RAY BILE/PANC ENDOSCOPY: CPT

## 2019-10-16 PROCEDURE — C1769 GUIDE WIRE: HCPCS | Performed by: INTERNAL MEDICINE

## 2019-10-16 PROCEDURE — 25010000002 PROPOFOL 10 MG/ML EMULSION: Performed by: NURSE ANESTHETIST, CERTIFIED REGISTERED

## 2019-10-16 RX ORDER — FAMOTIDINE 10 MG/ML
20 INJECTION, SOLUTION INTRAVENOUS ONCE
Status: COMPLETED | OUTPATIENT
Start: 2019-10-16 | End: 2019-10-16

## 2019-10-16 RX ORDER — SODIUM CHLORIDE 0.9 % (FLUSH) 0.9 %
3 SYRINGE (ML) INJECTION EVERY 12 HOURS SCHEDULED
Status: DISCONTINUED | OUTPATIENT
Start: 2019-10-16 | End: 2019-10-16 | Stop reason: HOSPADM

## 2019-10-16 RX ORDER — MEPERIDINE HYDROCHLORIDE 25 MG/ML
12.5 INJECTION INTRAMUSCULAR; INTRAVENOUS; SUBCUTANEOUS
Status: DISCONTINUED | OUTPATIENT
Start: 2019-10-16 | End: 2019-10-16 | Stop reason: HOSPADM

## 2019-10-16 RX ORDER — SODIUM CHLORIDE 0.9 % (FLUSH) 0.9 %
3-10 SYRINGE (ML) INJECTION AS NEEDED
Status: DISCONTINUED | OUTPATIENT
Start: 2019-10-16 | End: 2019-10-16 | Stop reason: HOSPADM

## 2019-10-16 RX ORDER — EPHEDRINE SULFATE 50 MG/ML
5 INJECTION, SOLUTION INTRAVENOUS ONCE AS NEEDED
Status: DISCONTINUED | OUTPATIENT
Start: 2019-10-16 | End: 2019-10-16 | Stop reason: HOSPADM

## 2019-10-16 RX ORDER — FAMOTIDINE 20 MG/1
20 TABLET, FILM COATED ORAL ONCE
Status: DISCONTINUED | OUTPATIENT
Start: 2019-10-16 | End: 2019-10-16 | Stop reason: HOSPADM

## 2019-10-16 RX ORDER — ASPIRIN 81 MG/1
81 TABLET ORAL DAILY
Status: ON HOLD | COMMUNITY
End: 2021-01-01

## 2019-10-16 RX ORDER — SODIUM CHLORIDE, SODIUM LACTATE, POTASSIUM CHLORIDE, CALCIUM CHLORIDE 600; 310; 30; 20 MG/100ML; MG/100ML; MG/100ML; MG/100ML
1000 INJECTION, SOLUTION INTRAVENOUS CONTINUOUS
Status: DISCONTINUED | OUTPATIENT
Start: 2019-10-16 | End: 2019-10-16 | Stop reason: HOSPADM

## 2019-10-16 RX ORDER — ONDANSETRON 2 MG/ML
4 INJECTION INTRAMUSCULAR; INTRAVENOUS ONCE AS NEEDED
Status: DISCONTINUED | OUTPATIENT
Start: 2019-10-16 | End: 2019-10-16 | Stop reason: HOSPADM

## 2019-10-16 RX ORDER — LABETALOL HYDROCHLORIDE 5 MG/ML
5 INJECTION, SOLUTION INTRAVENOUS
Status: DISCONTINUED | OUTPATIENT
Start: 2019-10-16 | End: 2019-10-16 | Stop reason: HOSPADM

## 2019-10-16 RX ORDER — LIDOCAINE HYDROCHLORIDE 10 MG/ML
0.5 INJECTION, SOLUTION EPIDURAL; INFILTRATION; INTRACAUDAL; PERINEURAL ONCE AS NEEDED
Status: DISCONTINUED | OUTPATIENT
Start: 2019-10-16 | End: 2019-10-16 | Stop reason: HOSPADM

## 2019-10-16 RX ORDER — SODIUM CHLORIDE, SODIUM LACTATE, POTASSIUM CHLORIDE, CALCIUM CHLORIDE 600; 310; 30; 20 MG/100ML; MG/100ML; MG/100ML; MG/100ML
9 INJECTION, SOLUTION INTRAVENOUS CONTINUOUS
Status: DISCONTINUED | OUTPATIENT
Start: 2019-10-16 | End: 2019-10-16 | Stop reason: HOSPADM

## 2019-10-16 RX ORDER — SODIUM CHLORIDE 0.9 % (FLUSH) 0.9 %
10 SYRINGE (ML) INJECTION AS NEEDED
Status: DISCONTINUED | OUTPATIENT
Start: 2019-10-16 | End: 2019-10-16 | Stop reason: HOSPADM

## 2019-10-16 RX ORDER — NALOXONE HCL 0.4 MG/ML
0.4 VIAL (ML) INJECTION AS NEEDED
Status: DISCONTINUED | OUTPATIENT
Start: 2019-10-16 | End: 2019-10-16 | Stop reason: HOSPADM

## 2019-10-16 RX ORDER — SODIUM CHLORIDE, SODIUM LACTATE, POTASSIUM CHLORIDE, CALCIUM CHLORIDE 600; 310; 30; 20 MG/100ML; MG/100ML; MG/100ML; MG/100ML
100 INJECTION, SOLUTION INTRAVENOUS CONTINUOUS
Status: DISCONTINUED | OUTPATIENT
Start: 2019-10-16 | End: 2019-10-16 | Stop reason: HOSPADM

## 2019-10-16 RX ADMIN — LABETALOL 20 MG/4 ML (5 MG/ML) INTRAVENOUS SYRINGE 5 MG: at 17:32

## 2019-10-16 RX ADMIN — PROPOFOL 125 MCG/KG/MIN: 10 INJECTION, EMULSION INTRAVENOUS at 16:35

## 2019-10-16 RX ADMIN — SODIUM CHLORIDE, POTASSIUM CHLORIDE, SODIUM LACTATE AND CALCIUM CHLORIDE 1000 ML/HR: 600; 310; 30; 20 INJECTION, SOLUTION INTRAVENOUS at 14:41

## 2019-10-16 RX ADMIN — FAMOTIDINE 20 MG: 10 INJECTION INTRAVENOUS at 14:41

## 2019-10-16 NOTE — OP NOTE
SEE COMPLETE ERCP REPORT  ERCP completed with cholangiogram and balloon sweep of CBD (no stones). PD stent removed  PLAN  Office appt f/u in 6-8 weeks.   Consider gilberto for symptomatic cholelithiasis

## 2019-10-16 NOTE — ANESTHESIA PREPROCEDURE EVALUATION
Anesthesia Evaluation     Patient summary reviewed and Nursing notes reviewed   NPO Solid Status: > 6 hours  NPO Liquid Status: > 6 hours           Airway   Mallampati: I  TM distance: >3 FB  Neck ROM: full  No difficulty expected  Dental      Pulmonary    (-) COPD, asthma, shortness of breath, recent URI, not a smoker  Cardiovascular     (-) pacemaker, past MI, dysrhythmias, angina, cardiac stentsHypertension: no Rx now.      Neuro/Psych  (+) syncope,     (-) seizures, CVA  GI/Hepatic/Renal/Endo    (+)   liver disease (cirrhosis),   (-) no renal disease, diabetes, hypothyroidism    ROS Comment: No current N and V     Musculoskeletal     Abdominal    Substance History      OB/GYN          Other   (+) arthritis     ROS/Med Hx Other: Ate breakfast before 0700       Phys Exam Other: Mac 3 grade 1 view                Anesthesia Plan    ASA 3     MAC     intravenous induction   Anesthetic plan, all risks, benefits, and alternatives have been provided, discussed and informed consent has been obtained with: patient.    Plan discussed with CRNA.

## 2019-10-16 NOTE — ANESTHESIA POSTPROCEDURE EVALUATION
Patient: Janeth Mitchell    Procedure Summary     Date:  10/16/19 Room / Location:  [facility] /  [facility]    Anesthesia Start:  1630 Anesthesia Stop:  1713    Procedure:  ENDOSCOPIC RETROGRADE CHOLANGIOPANCREATOGRAPHY (N/A ) Diagnosis:       Obstruction of bile duct      (Obstruction of bile duct [K83.1])    Surgeon:  Kranthi Hercules MD Provider:  Luís Hernández MD    Anesthesia Type:  MAC ASA Status:  3          Anesthesia Type: MAC  Last vitals  BP   149/93   Temp   97   Pulse   68   Resp   18    SpO2   99     Post Anesthesia Care and Evaluation    Patient location during evaluation: PACU  Patient participation: complete - patient participated  Level of consciousness: awake and alert  Pain score: 0  Pain management: adequate  Airway patency: patent  Anesthetic complications: No anesthetic complications  PONV Status: none  Cardiovascular status: hemodynamically stable and acceptable  Respiratory status: nonlabored ventilation, acceptable and nasal cannula  Hydration status: acceptable

## 2019-10-21 ENCOUNTER — READMISSION MANAGEMENT (OUTPATIENT)
Dept: CALL CENTER | Facility: HOSPITAL | Age: 84
End: 2019-10-21

## 2019-10-21 NOTE — H&P
GASTROENTEROLOGY OFFICE NOTE  Janeth Mitchell  [id number]  1935    CARE TEAM  Patient Care Team:  Alexx Tafoya MD as PCP - General (Internal Medicine)    Kranthi Hercules MD    Chief complaint  Pancreatic stent removal  Common bile duct clearance    HISTORY OF PRESENT ILLNESS:  Patient presents for follow-up ERCP after recent hospitalization for gallstone pancreatitis.  Prior ERCP resulted in cannulation of the pancreatic duct and placement of temporary pancreatic stent with failed cannulation of the common bile duct.  She presents today for completion ERCP in the absence of fevers, chills, jaundice, dark urine or abdominal pain.    PAST MEDICAL HISTORY  Past Medical History:   Diagnosis Date   • Arthritis    • Hypertension         PAST SURGICAL HISTORY  Past Surgical History:   Procedure Laterality Date   • CERVICAL POLYPECTOMY     • ERCP N/A 10/4/2019    Procedure: ENDOSCOPIC RETROGRADE CHOLANGIOPANCREATOGRAPHY;  Surgeon: Kranthi Hercules MD;  Location:  [facility];  Service: Gastroenterology   • ERCP N/A 10/16/2019    Procedure: ENDOSCOPIC RETROGRADE CHOLANGIOPANCREATOGRAPHY;  Surgeon: Kranthi Hercules MD;  Location:  [facility];  Service: Gastroenterology   • SKIN BIOPSY          MEDICATIONS:  No current facility-administered medications for this encounter.     Current Outpatient Medications:   •  aspirin 81 MG EC tablet, Take 81 mg by mouth Daily., Disp: , Rfl:     ALLERGIES  Allergies   Allergen Reactions   • Sulfa Antibiotics Angioedema     States she cant remember   • Sulfa Antibiotics Unknown (See Comments)     Pt unsure but states she is allergic       FAMILY HISTORY:  Family History   Problem Relation Age of Onset   • Diabetes Mother    • Cancer Mother    • Stroke Father        SOCIAL HISTORY  Social History     Socioeconomic History   • Marital status:      Spouse name: Not on file   • Number of children: Not on file   • Years of education:  "Not on file   • Highest education level: Not on file   Tobacco Use   • Smoking status: Never Smoker   • Smokeless tobacco: Never Used   Substance and Sexual Activity   • Alcohol use: No     Frequency: Never   • Drug use: No   • Sexual activity: Defer     Socioeconomic History:        REVIEW OF SYSTEMS  Review of Systems  Unchanged from prior    PHYSICAL EXAM   /77 (BP Location: Right arm, Patient Position: Lying) Comment: 175 SBP at admission. patient asympt.   Pulse 63   Temp 97.6 °F (36.4 °C) (Temporal)   Resp 16   Ht 152.4 cm (60\")   Wt 80.5 kg (177 lb 6.4 oz)   SpO2 97%   BMI 34.65 kg/m²   General: Alert and oriented x 3. In no apparent or acute distress.  and No stigmata of chronic liver disease  HEENT: Anicteric slcera. Normal oropharynx  Neck: Supple. Without lymphadenopathy  CV: Regular rate and rhythm, S1, S2  Lungs: Clear to ausculation. Without rales, robchi and wheezing  Abdomen:  Soft,non-distended without palpable masses or hepatosplenomeagaly, areas of rebound tenderness or guarding.   Extremeties: without clubbing, cyanosis or edema  Neurologic:  Alert and oriented x 3 without focal motor or sensory deficits  Rectal exam: deferred        Results Review:  I reviewed the patient's new clinical results.      ASSESSMENT  1.-  Choledocholithiasis  2.-  Resolved gallstone pancreatitis  3.-  Indwelling pancreatic stent    PLAN  1.-  ERCP for PD stent removal and CBD clearance.  Risks of perforation, cardiorespiratory compromise, missed lesion and most importantly risk of severe pancreatitis and its consequences were reviewed in detail with patient and family who wished to proceed.  Further recommendations are deferred pending findings of ERCP      I discussed the patients findings and my recommendations with patient    Kranthi Hercules MD  10/21/2019   1:26 PM    Much of this note is an electronic transcription of spoken language to printed text. Electronic transcription of spoken " language may permit erroneous, nonsensical word phrases to be inadvertently transcribed.  Although I have reviewed the note for these errors, some may still be present.

## 2019-10-30 ENCOUNTER — READMISSION MANAGEMENT (OUTPATIENT)
Dept: CALL CENTER | Facility: HOSPITAL | Age: 84
End: 2019-10-30

## 2019-10-30 NOTE — OUTREACH NOTE
Medical Week 3 Survey      Responses   Facility patient discharged from?  [facility]   Does the patient have one of the following disease processes/diagnoses(primary or secondary)?  Other   Week 3 attempt successful?  Yes   Call start time  1019   Call end time  1024   Discharge diagnosis  Gallstone pancreatitis, Cirrhosis of liver, pseudocyst of pancrease, essential HTN, arthritis, UTI, syncope, s/p ERCP with stent placement   Meds reviewed with patient/caregiver?  Yes   Is the patient taking all medications as directed (includes completed medication regime)?  Yes   Does the patient have an appointment with their PCP within 7 days of discharge?  Greater than 7 days   Nursing Interventions  Verified appointment date/time/provider   Has the patient kept scheduled appointments due by today?  N/A   Comments  PCP Dr Tafoya Wednesday   What is the patient's perception of their health status since discharge?  Improving   Week 3 Call Completed?  Yes          Analilia Recinos RN

## 2019-11-07 ENCOUNTER — READMISSION MANAGEMENT (OUTPATIENT)
Dept: CALL CENTER | Facility: HOSPITAL | Age: 84
End: 2019-11-07

## 2019-11-07 NOTE — OUTREACH NOTE
Medical Week 4 Survey      Responses   Facility patient discharged from?  [facility]   Does the patient have one of the following disease processes/diagnoses(primary or secondary)?  Other   Week 4 attempt successful?  Yes   Call start time  1525   Call end time  1529   Discharge diagnosis  Gallstone pancreatitis, Cirrhosis of liver, pseudocyst of pancrease, essential HTN, arthritis, UTI, syncope, s/p ERCP with stent placement   Meds reviewed with patient/caregiver?  Yes   Is the patient taking all medications as directed (includes completed medication regime)?  Yes   Medication comments  Dr. Tafoya added BP medication and water pill back on 11/6/19   Has the patient kept scheduled appointments due by today?  Yes   Comments  Appointment with PCP 11/6/19. He put patient on bland diet and wants to recheck blood work 11/26/19.    Is the patient still receiving Home Health Services?  N/A   Psychosocial issues?  No   What is the patient's perception of their health status since discharge?  Improving   If the patient is a current smoker, are they able to teach back resources for cessation?  -- [Pt is not a smoker]   Additional teach back comments  flu vaccine-declined   Week 4 Call Completed?  Yes   Would the patient like one additional call?  No   Graduated  Yes   Did the patient feel the follow up calls were helpful during their recovery period?  Yes   Was the number of calls appropriate?  Yes          Candace Alarcon RN

## 2019-11-27 ENCOUNTER — TELEPHONE (OUTPATIENT)
Dept: GASTROENTEROLOGY | Facility: CLINIC | Age: 84
End: 2019-11-27

## 2019-12-05 ENCOUNTER — TELEPHONE (OUTPATIENT)
Dept: GASTROENTEROLOGY | Facility: CLINIC | Age: 84
End: 2019-12-05

## 2019-12-17 ENCOUNTER — TELEPHONE (OUTPATIENT)
Dept: GASTROENTEROLOGY | Facility: CLINIC | Age: 84
End: 2019-12-17

## 2020-01-01 ENCOUNTER — OFFICE VISIT (OUTPATIENT)
Dept: GASTROENTEROLOGY | Facility: CLINIC | Age: 85
End: 2020-01-01

## 2020-01-01 ENCOUNTER — APPOINTMENT (OUTPATIENT)
Dept: ULTRASOUND IMAGING | Facility: HOSPITAL | Age: 85
End: 2020-01-01

## 2020-01-01 ENCOUNTER — HOSPITAL ENCOUNTER (OUTPATIENT)
Dept: MRI IMAGING | Facility: HOSPITAL | Age: 85
Discharge: HOME | End: 2020-11-06
Admitting: INTERNAL MEDICINE

## 2020-01-01 ENCOUNTER — TELEMEDICINE (OUTPATIENT)
Dept: GASTROENTEROLOGY | Facility: CLINIC | Age: 85
End: 2020-01-01

## 2020-01-01 ENCOUNTER — HOSPITAL ENCOUNTER (OUTPATIENT)
Dept: CARDIOLOGY | Facility: HOSPITAL | Age: 85
Discharge: HOME | End: 2020-07-27
Admitting: INTERNAL MEDICINE

## 2020-01-01 ENCOUNTER — TELEPHONE (OUTPATIENT)
Dept: GASTROENTEROLOGY | Facility: CLINIC | Age: 85
End: 2020-01-01

## 2020-01-01 ENCOUNTER — TRANSCRIBE ORDERS (OUTPATIENT)
Dept: ADMINISTRATIVE | Facility: HOSPITAL | Age: 85
End: 2020-01-01

## 2020-01-01 ENCOUNTER — HOSPITAL ENCOUNTER (OUTPATIENT)
Dept: ULTRASOUND IMAGING | Facility: HOSPITAL | Age: 85
Discharge: HOME | End: 2020-07-27

## 2020-01-01 DIAGNOSIS — D49.0 IPMN (INTRADUCTAL PAPILLARY MUCINOUS NEOPLASM): Primary | ICD-10-CM

## 2020-01-01 DIAGNOSIS — R93.3 ABNORMAL FINDINGS ON DIAGNOSTIC IMAGING OF OTHER PARTS OF DIGESTIVE TRACT: ICD-10-CM

## 2020-01-01 DIAGNOSIS — K74.60 CIRRHOSIS OF LIVER WITHOUT ASCITES, UNSPECIFIED HEPATIC CIRRHOSIS TYPE (HCC): ICD-10-CM

## 2020-01-01 DIAGNOSIS — R74.8 ELEVATED LIVER ENZYMES: ICD-10-CM

## 2020-01-01 DIAGNOSIS — R60.0 MILD PERIPHERAL EDEMA: ICD-10-CM

## 2020-01-01 DIAGNOSIS — M79.605 BILATERAL LEG PAIN: Primary | ICD-10-CM

## 2020-01-01 DIAGNOSIS — M79.604 BILATERAL LEG PAIN: Primary | ICD-10-CM

## 2020-01-01 DIAGNOSIS — M79.605 BILATERAL LEG PAIN: ICD-10-CM

## 2020-01-01 DIAGNOSIS — K80.20 CALCULUS OF GALLBLADDER WITHOUT CHOLECYSTITIS WITHOUT OBSTRUCTION: ICD-10-CM

## 2020-01-01 DIAGNOSIS — D49.0 IPMN (INTRADUCTAL PAPILLARY MUCINOUS NEOPLASM): ICD-10-CM

## 2020-01-01 DIAGNOSIS — K74.60 CIRRHOSIS OF LIVER WITHOUT ASCITES, UNSPECIFIED HEPATIC CIRRHOSIS TYPE (HCC): Primary | ICD-10-CM

## 2020-01-01 DIAGNOSIS — M79.604 BILATERAL LEG PAIN: ICD-10-CM

## 2020-01-01 PROCEDURE — 93970 EXTREMITY STUDY: CPT

## 2020-01-01 PROCEDURE — 76700 US EXAM ABDOM COMPLETE: CPT

## 2020-01-01 PROCEDURE — 74181 MRI ABDOMEN W/O CONTRAST: CPT | Performed by: RADIOLOGY

## 2020-01-01 PROCEDURE — 99441 PR PHYS/QHP TELEPHONE EVALUATION 5-10 MIN: CPT | Performed by: INTERNAL MEDICINE

## 2020-01-01 PROCEDURE — 99213 OFFICE O/P EST LOW 20 MIN: CPT | Performed by: INTERNAL MEDICINE

## 2020-01-01 PROCEDURE — 76700 US EXAM ABDOM COMPLETE: CPT | Performed by: RADIOLOGY

## 2020-01-01 PROCEDURE — 93970 EXTREMITY STUDY: CPT | Performed by: RADIOLOGY

## 2020-01-01 PROCEDURE — 99442 PR PHYS/QHP TELEPHONE EVALUATION 11-20 MIN: CPT | Performed by: INTERNAL MEDICINE

## 2020-01-01 PROCEDURE — 74181 MRI ABDOMEN W/O CONTRAST: CPT

## 2020-01-01 RX ORDER — FUROSEMIDE 20 MG/1
20 TABLET ORAL DAILY
COMMUNITY

## 2020-01-01 RX ORDER — POTASSIUM CHLORIDE 750 MG/1
10 CAPSULE, EXTENDED RELEASE ORAL DAILY
Status: ON HOLD | COMMUNITY
End: 2021-01-01

## 2020-01-23 ENCOUNTER — OFFICE VISIT (OUTPATIENT)
Dept: GASTROENTEROLOGY | Facility: CLINIC | Age: 85
End: 2020-01-23

## 2020-01-23 ENCOUNTER — LAB (OUTPATIENT)
Dept: LAB | Facility: HOSPITAL | Age: 85
End: 2020-01-23

## 2020-01-23 VITALS
HEIGHT: 60 IN | SYSTOLIC BLOOD PRESSURE: 163 MMHG | DIASTOLIC BLOOD PRESSURE: 64 MMHG | BODY MASS INDEX: 33.06 KG/M2 | WEIGHT: 168.4 LBS | HEART RATE: 65 BPM

## 2020-01-23 DIAGNOSIS — K72.10 CHRONIC HEPATIC FAILURE WITHOUT COMA (HCC): ICD-10-CM

## 2020-01-23 DIAGNOSIS — R93.3 ABNORMAL FINDINGS ON DIAGNOSTIC IMAGING OF OTHER PARTS OF DIGESTIVE TRACT: ICD-10-CM

## 2020-01-23 DIAGNOSIS — E78.70 DISORDER OF BILE ACID AND CHOLESTEROL METABOLISM, UNSPECIFIED: ICD-10-CM

## 2020-01-23 DIAGNOSIS — R74.8 ELEVATED LIVER ENZYMES: ICD-10-CM

## 2020-01-23 DIAGNOSIS — K74.60 CIRRHOSIS OF LIVER WITHOUT ASCITES, UNSPECIFIED HEPATIC CIRRHOSIS TYPE (HCC): Primary | ICD-10-CM

## 2020-01-23 DIAGNOSIS — K85.10 GALLSTONE PANCREATITIS: ICD-10-CM

## 2020-01-23 DIAGNOSIS — K80.20 CALCULUS OF GALLBLADDER WITHOUT CHOLECYSTITIS WITHOUT OBSTRUCTION: ICD-10-CM

## 2020-01-23 DIAGNOSIS — K74.60 CIRRHOSIS OF LIVER WITHOUT ASCITES, UNSPECIFIED HEPATIC CIRRHOSIS TYPE (HCC): ICD-10-CM

## 2020-01-23 LAB
FERRITIN SERPL-MCNC: 28.5 NG/ML (ref 13–150)
HCV AB SER DONR QL: NORMAL
INR PPP: 1.22 (ref 0.85–1.16)
IRON 24H UR-MRATE: 165 MCG/DL (ref 37–145)
IRON SATN MFR SERPL: 48 % (ref 20–50)
PROTHROMBIN TIME: 14.8 SECONDS (ref 11.2–14.3)
TIBC SERPL-MCNC: 346 MCG/DL (ref 298–536)
TRANSFERRIN SERPL-MCNC: 232 MG/DL (ref 200–360)

## 2020-01-23 PROCEDURE — 86256 FLUORESCENT ANTIBODY TITER: CPT

## 2020-01-23 PROCEDURE — 82247 BILIRUBIN TOTAL: CPT

## 2020-01-23 PROCEDURE — 82172 ASSAY OF APOLIPOPROTEIN: CPT

## 2020-01-23 PROCEDURE — 83540 ASSAY OF IRON: CPT

## 2020-01-23 PROCEDURE — 86255 FLUORESCENT ANTIBODY SCREEN: CPT

## 2020-01-23 PROCEDURE — 84478 ASSAY OF TRIGLYCERIDES: CPT

## 2020-01-23 PROCEDURE — 82728 ASSAY OF FERRITIN: CPT

## 2020-01-23 PROCEDURE — 36415 COLL VENOUS BLD VENIPUNCTURE: CPT

## 2020-01-23 PROCEDURE — 82977 ASSAY OF GGT: CPT

## 2020-01-23 PROCEDURE — 99214 OFFICE O/P EST MOD 30 MIN: CPT | Performed by: INTERNAL MEDICINE

## 2020-01-23 PROCEDURE — 83883 ASSAY NEPHELOMETRY NOT SPEC: CPT

## 2020-01-23 PROCEDURE — 83516 IMMUNOASSAY NONANTIBODY: CPT

## 2020-01-23 PROCEDURE — 82947 ASSAY GLUCOSE BLOOD QUANT: CPT

## 2020-01-23 PROCEDURE — 82103 ALPHA-1-ANTITRYPSIN TOTAL: CPT

## 2020-01-23 PROCEDURE — 84466 ASSAY OF TRANSFERRIN: CPT

## 2020-01-23 PROCEDURE — 84450 TRANSFERASE (AST) (SGOT): CPT

## 2020-01-23 PROCEDURE — 85610 PROTHROMBIN TIME: CPT

## 2020-01-23 PROCEDURE — 83010 ASSAY OF HAPTOGLOBIN QUANT: CPT

## 2020-01-23 PROCEDURE — 82784 ASSAY IGA/IGD/IGG/IGM EACH: CPT

## 2020-01-23 PROCEDURE — 86803 HEPATITIS C AB TEST: CPT

## 2020-01-23 PROCEDURE — 82465 ASSAY BLD/SERUM CHOLESTEROL: CPT

## 2020-01-23 PROCEDURE — 84460 ALANINE AMINO (ALT) (SGPT): CPT

## 2020-01-23 PROCEDURE — 82104 ALPHA-1-ANTITRYPSIN PHENO: CPT

## 2020-01-23 RX ORDER — ENALAPRIL MALEATE 20 MG/1
TABLET ORAL
COMMUNITY
Start: 2019-01-23

## 2020-01-23 RX ORDER — TRIAMTERENE AND HYDROCHLOROTHIAZIDE 37.5; 25 MG/1; MG/1
TABLET ORAL
COMMUNITY
Start: 2019-01-23 | End: 2021-01-01

## 2020-01-23 NOTE — PROGRESS NOTES
GASTROENTEROLOGY OFFICE NOTE  Janeth Mitchell  [id number]  1935    CARE TEAM  Patient Care Team:  Alexx Tafoya MD as PCP - General (Internal Medicine)    No ref. provider found     Chief Complaint   Patient presents with   • Follow-up     Post ERCP for Bile duct stone   • Pancreatitis     History of acute pancreatitis         HISTORY OF PRESENT ILLNESS:  Patient presents for follow-up after recent hospitalization for gallstone pancreatitis.  She was admitted in October with nausea vomiting and abdominal pain.  She was found to have biliary sludge, pancreatitis and cirrhosis by imaging studies.  She had had a prior episode 6 weeks prior to that that resolved spontaneously.  A 4 mm common duct stone was noted on MRCP.  Small stones were noted in the gallbladder.  Longstanding history of elevated liver enzymes noted.    She underwent ERCP with common duct clearance.  She presents today asymptomatic.  While she does have sludge in her gallbladder with some perhaps minimal cholelithiasis she is asymptomatic without postprandial abdominal pain, bloating, or dyspeptic symptoms.  She is without dysphagia to solids, odynophagia, early satiety, unexplained weight loss, melena or bright red blood per rectum.  She denies dark urine, acholic stools, jaundice.  For the most part she feels that she is doing well.    PAST MEDICAL HISTORY  Past Medical History:   Diagnosis Date   • Arthritis    • Hypertension    • Pancreatitis due to common bile duct stone         PAST SURGICAL HISTORY  Past Surgical History:   Procedure Laterality Date   • CERVICAL POLYPECTOMY     • ERCP N/A 10/4/2019    Procedure: ENDOSCOPIC RETROGRADE CHOLANGIOPANCREATOGRAPHY;  Surgeon: Kranthi Hercules MD;  Location:  [facility];  Service: Gastroenterology   • ERCP N/A 10/16/2019    Procedure: ENDOSCOPIC RETROGRADE CHOLANGIOPANCREATOGRAPHY;  Surgeon: Kranthi Hercules MD;  Location:  [facility];  Service:  Gastroenterology   • SKIN BIOPSY          MEDICATIONS:    Current Outpatient Medications:   •  aspirin 81 MG EC tablet, Take 81 mg by mouth Daily., Disp: , Rfl:   •  enalapril (VASOTEC) 20 MG tablet, TAKE ONE TABLET BY MOUTH EVERY DAY FOR BLOOD PRESSURE, Disp: , Rfl:   •  triamterene-hydrochlorothiazide (MAXZIDE-25) 37.5-25 MG per tablet, TAKE ONE TABLET BY MOUTH ONE TIME DAILY FOR BLOODPRESSURE AND FLUID, Disp: , Rfl:     ALLERGIES  Allergies   Allergen Reactions   • Sulfa Antibiotics Angioedema     States she cant remember   • Sulfa Antibiotics Unknown (See Comments)     Pt unsure but states she is allergic       FAMILY HISTORY:  Family History   Problem Relation Age of Onset   • Diabetes Mother    • Cancer Mother    • Stroke Father    • Colon polyps Neg Hx    • Colon cancer Neg Hx        SOCIAL HISTORY  Social History     Socioeconomic History   • Marital status:      Spouse name: Not on file   • Number of children: Not on file   • Years of education: Not on file   • Highest education level: Not on file   Tobacco Use   • Smoking status: Never Smoker   • Smokeless tobacco: Never Used   Substance and Sexual Activity   • Alcohol use: No     Frequency: Never   • Drug use: No   • Sexual activity: Defer     Socioeconomic History:  She is a .  She is accompanied by her daughters today.  She is a non-smoker/nondrinker.       REVIEW OF SYSTEMS  Review of Systems   Constitutional: Negative for activity change, appetite change, chills, diaphoresis, fatigue, fever, unexpected weight gain and unexpected weight loss.   HENT: Negative for congestion, dental problem, drooling, ear discharge, ear pain, facial swelling, hearing loss, mouth sores, nosebleeds, postnasal drip, rhinorrhea, sinus pressure, sneezing, sore throat, swollen glands, tinnitus, trouble swallowing and voice change.    Respiratory: Negative for apnea, cough, choking, chest tightness, shortness of breath, wheezing and stridor.    Cardiovascular:  "Negative for chest pain, palpitations and leg swelling.   Gastrointestinal: Negative for abdominal distention, abdominal pain, anal bleeding, blood in stool, constipation, diarrhea, nausea, rectal pain, vomiting, GERD and indigestion.   Endocrine: Negative for cold intolerance, heat intolerance, polydipsia, polyphagia and polyuria.   Musculoskeletal: Negative for arthralgias, back pain, gait problem, joint swelling, myalgias, neck pain, neck stiffness and bursitis.   Allergic/Immunologic: Negative for environmental allergies, food allergies and immunocompromised state.   Neurological: Negative for dizziness, tremors, seizures, syncope, facial asymmetry, speech difficulty, weakness, light-headedness, numbness, headache, memory problem and confusion.   Hematological: Negative for adenopathy. Does not bruise/bleed easily.   Psychiatric/Behavioral: Negative for agitation, behavioral problems, decreased concentration, dysphoric mood, hallucinations, self-injury, sleep disturbance, suicidal ideas, negative for hyperactivity, depressed mood and stress. The patient is not nervous/anxious.      I reviewed the above-noted review of systems.    PHYSICAL EXAM   /64 (BP Location: Right arm, Patient Position: Sitting, Cuff Size: Adult)   Pulse 65   Ht 152.4 cm (60\")   Wt 76.4 kg (168 lb 6.4 oz)   BMI 32.89 kg/m²   General: Alert and oriented x 3. In no apparent or acute distress.  and No stigmata of chronic liver disease  HEENT: Anicteric slcera. Normal oropharynx  Neck: Supple. Without lymphadenopathy  CV: Regular rate and rhythm, S1, S2  Lungs: Clear to ausculation. Without rales, robchi and wheezing  Abdomen:  Soft,non-distended without palpable masses or hepatosplenomeagaly, areas of rebound tenderness or guarding.   Extremeties: without clubbing, cyanosis or edema  Neurologic:  Alert and oriented x 3 without focal motor or sensory deficits  Rectal exam: deferred        Results Review:  I reviewed the patient's new " clinical results.      ASSESSMENT  1.-  Status post gallstone pancreatitis currently asymptomatic.  Following biliary sphincterotomy her risk for recurrent gallstone pancreatitis is virtually eliminated.  2.-  Choledocholithiasis.  Status post ERCP with sphincterotomy and common duct clearance  3.-  Asymptomatic cholelithiasis.  Review of records reveal ultrasound did not reveal gallstones and an MRCP revealed perhaps some sludge versus minimal cholelithiasis.  I believe conservative management should suffice as there is some question as to whether she really has cholelithiasis and if she does she is asymptomatic and therefore I think conservative management would suffice with early intervention should symptoms develop that might suggest symptomatic cholelithiasis.  In addition, her underlying cirrhosis makes her a poor operative candidate and elective surgery I think would be best avoided/do not induce decompensation of her liver disease.  4.-  History of elevated serum liver enzymes.  Initiate serologic work-up for exclusion of parenchymal liver disease.  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  As noted above initiate serological work-up for exclusion of viral, storage, autoimmune diseases.    PLAN  1.-  Weight loss is encouraged as a cornerstone of management of nonalcoholic steatohepatitis related cirrhosis.  2.-  Initiate serological work-up for exclusion of parenchymal liver disease  3.-  Ultrasound and alpha-fetoprotein every 6 months is recommended given her increased risk for hepatocellular carcinoma related to underlying cirrhosis.  4.-  Follow-up appointment in 1 year.  Sooner should any new problems develop.  Monitor for signs and symptoms of decompensated liver disease.      I discussed the patients findings and my recommendations with patient    Kranthi Hercules MD  1/23/2020   2:15 PM    Much of this note is an electronic transcription of spoken language to printed  text. Electronic transcription of spoken language may permit erroneous, nonsensical word phrases to be inadvertently transcribed.  Although I have reviewed the note for these errors, some may still be present.

## 2020-01-24 PROBLEM — K80.20 CALCULUS OF GALLBLADDER WITHOUT CHOLECYSTITIS WITHOUT OBSTRUCTION: Status: ACTIVE | Noted: 2020-01-24

## 2020-01-24 PROBLEM — R74.8 ELEVATED LIVER ENZYMES: Status: ACTIVE | Noted: 2020-01-24

## 2020-01-24 PROBLEM — R93.3 ABNORMAL FINDINGS ON DIAGNOSTIC IMAGING OF OTHER PARTS OF DIGESTIVE TRACT: Status: ACTIVE | Noted: 2020-01-24

## 2020-01-25 LAB — ACTIN IGG SERPL-ACNC: 11 UNITS (ref 0–19)

## 2020-01-27 LAB
ENDOMYSIUM IGA SER QL: NEGATIVE
GLIADIN PEPTIDE IGA SER-ACNC: 10 UNITS (ref 0–19)
GLIADIN PEPTIDE IGG SER-ACNC: 1 UNITS (ref 0–19)
IGA SERPL-MCNC: 698 MG/DL (ref 64–422)
TTG IGA SER-ACNC: <2 U/ML (ref 0–3)
TTG IGG SER-ACNC: <2 U/ML (ref 0–5)

## 2020-01-28 LAB
A2 MACROGLOB SERPL-MCNC: 361 MG/DL (ref 110–276)
ALT SERPL W P-5'-P-CCNC: 28 IU/L (ref 0–40)
APO A-I SERPL-MCNC: 82 MG/DL (ref 114–214)
AST SERPL W P-5'-P-CCNC: 71 IU/L (ref 0–40)
BILIRUB SERPL-MCNC: 0.9 MG/DL (ref 0–1.2)
C-ANCA TITR SER IF: NORMAL TITER
CHOLEST SERPL-MCNC: 104 MG/DL (ref 100–199)
FIBROSIS SCORING:: ABNORMAL
FIBROSIS STAGE SERPL QL: ABNORMAL
GGT SERPL-CCNC: 75 IU/L (ref 0–60)
GLUCOSE SERPL-MCNC: 123 MG/DL (ref 65–99)
HAPTOGLOB SERPL-MCNC: 60 MG/DL (ref 41–333)
INTERPRETATIONS: (REFERENCE): ABNORMAL
LABORATORY COMMENT REPORT: ABNORMAL
LIMITATIONS: (REFERENCE): ABNORMAL
LIVER FIBR SCORE SERPL CALC.FIBROSURE: 0.93 (ref 0–0.21)
NASH GRADE (REFERENCE): ABNORMAL
NASH SCORE (REFERENCE): 0.75
NASH SCORING (REFERENCE): ABNORMAL
P-ANCA ATYPICAL TITR SER IF: NORMAL TITER
P-ANCA TITR SER IF: NORMAL TITER
STEATOSIS GRADE (REFERENCE): ABNORMAL
STEATOSIS GRADING (REFERENCE): ABNORMAL
STEATOSIS SCORE (REFERENCE): 0.76 (ref 0–0.3)
TRIGL SERPL-MCNC: 88 MG/DL (ref 0–149)
WEIGHT: (REFERENCE): 168 LBS

## 2020-01-29 NOTE — PROGRESS NOTES
Notify patient that result are normal.  That is to say the elastography did confirm cirrhosis, which was already known, markers for autoimmune hepatitis are negative and I remain convinced that her cirrhosis is due to nonalcoholic steatohepatitis.  Weight loss is encouraged.  Patient to call us should any new problems develop.  I recommend every 6-month alpha-fetoprotein and ultrasound.

## 2020-01-30 LAB
A1AT SERPL-MCNC: 97 MG/DL (ref 101–187)
PHENOTYPE: ABNORMAL

## 2020-02-03 ENCOUNTER — TELEPHONE (OUTPATIENT)
Dept: GASTROENTEROLOGY | Facility: CLINIC | Age: 85
End: 2020-02-03

## 2020-02-03 NOTE — TELEPHONE ENCOUNTER
----- Message from Kranthi Hercules MD sent at 1/30/2020  8:45 AM EST -----  [place]  Last ultrasound = October = repeat in April   ----- Message -----  From: Ananya Gonsales MA  Sent: 1/29/2020  12:02 PM EST  To: Kranthi Hercules MD    When does patient need her next ultrasound and labs for cirrhosis ?

## 2020-06-11 NOTE — TELEPHONE ENCOUNTER
Called patient and notified her we need repeat labs and ultrasound because of her diagnosis of cirrhosis. [facility] scheduling will be in contact with the patient to schedule the ultrasound but she can go to any [facility] facility and have her labs drawn or we can mail her a copy of the lab orders. Patient confirmed she understood and would have this completed.

## 2020-07-27 NOTE — PROGRESS NOTES
Ultrasound done for hepatocellular carcinoma screening.  Incidental pancreatic head cyst noted.  Observe no further recommendations at this time

## 2020-08-04 NOTE — PROGRESS NOTES
GASTROENTEROLOGY OFFICE NOTE  Janeth Mitchell  [id number]  1935    CARE TEAM  Patient Care Team:  Alexx Tafoya MD as PCP - General (Internal Medicine)       You have chosen to receive care through a telehealth visit.  Do you consent to use a video/audio connection for your medical care today? Yes      No ref. provider found     Chief Complaint   Patient presents with   • Follow-up   • Cirrhosis        HISTORY OF PRESENT ILLNESS:  Patient presents for follow-up of cirrhosis.  She is noticing his peripheral edema and some mild abdominal distention.  She is on Lasix 20 mg daily and triamterene/hydrochlorothiazide 37.5/25 mg/tab.    She does admit to eating salty foods such as Ramen noodles, pickles, cured meats etc.    She denies hematemesis, melena, bright red blood per rectum dysphasia, odynophagia or early satiety.    Recent ultrasound of July 27 was negative for hepatocellular carcinoma or ascites there was an incidental pancreatic cyst however noted.  Alpha-fetoprotein was within normal limits.    PAST MEDICAL HISTORY  Past Medical History:   Diagnosis Date   • Arthritis    • Cirrhosis (CMS/HCC)    • Hypertension    • Pancreatitis due to common bile duct stone         PAST SURGICAL HISTORY  Past Surgical History:   Procedure Laterality Date   • CERVICAL POLYPECTOMY     • ERCP N/A 10/4/2019    Procedure: ENDOSCOPIC RETROGRADE CHOLANGIOPANCREATOGRAPHY;  Surgeon: Kranthi Hercules MD;  Location:  [facility];  Service: Gastroenterology   • ERCP N/A 10/16/2019    Procedure: ENDOSCOPIC RETROGRADE CHOLANGIOPANCREATOGRAPHY;  Surgeon: Kranthi Hercules MD;  Location:  [facility];  Service: Gastroenterology   • SKIN BIOPSY          MEDICATIONS:    Current Outpatient Medications:   •  aspirin 81 MG EC tablet, Take 81 mg by mouth Daily., Disp: , Rfl:   •  enalapril (VASOTEC) 20 MG tablet, TAKE ONE TABLET BY MOUTH EVERY DAY FOR BLOOD PRESSURE, Disp: , Rfl:   •  furosemide (LASIX)  20 MG tablet, Take 20 mg by mouth Daily., Disp: , Rfl:   •  potassium chloride (MICRO-K) 10 MEQ CR capsule, Take 10 mEq by mouth Daily., Disp: , Rfl:   •  triamterene-hydrochlorothiazide (MAXZIDE-25) 37.5-25 MG per tablet, TAKE ONE TABLET BY MOUTH ONE TIME DAILY FOR BLOODPRESSURE AND FLUID, Disp: , Rfl:     ALLERGIES  Allergies   Allergen Reactions   • Sulfa Antibiotics Angioedema     States she cant remember   • Sulfa Antibiotics Unknown (See Comments)     Pt unsure but states she is allergic       FAMILY HISTORY:  Family History   Problem Relation Age of Onset   • Diabetes Mother    • Cancer Mother    • Stroke Father    • Colon polyps Neg Hx    • Colon cancer Neg Hx        SOCIAL HISTORY  Social History     Socioeconomic History   • Marital status:      Spouse name: Not on file   • Number of children: Not on file   • Years of education: Not on file   • Highest education level: Not on file   Tobacco Use   • Smoking status: Never Smoker   • Smokeless tobacco: Never Used   Substance and Sexual Activity   • Alcohol use: No     Frequency: Never   • Drug use: No   • Sexual activity: Defer     Socioeconomic History:  .  Non-smoker.  Nondrinker.       REVIEW OF SYSTEMS  Review of Systems   Constitutional: Positive for activity change and fatigue. Negative for appetite change, chills, diaphoresis, fever, unexpected weight gain and unexpected weight loss.   HENT: Positive for voice change. Negative for trouble swallowing.    Gastrointestinal: Positive for abdominal distention, abdominal pain and indigestion. Negative for anal bleeding, blood in stool, constipation, diarrhea, nausea, rectal pain, vomiting and GERD.   I reviewed the above-noted review of systems.    PHYSICAL EXAM   There were no vitals taken for this visit.  General: Alert and oriented in good spirits  HEENT: Anicteric sclera grossly  Neck: Grossly normal neck motion without rigidity  Lungs: Grossly normal respirations without labored breathing,  speaking in full sentences, no audible wheezing  Extremeties: 1-2+ pitting edema demonstrated under my direction  Neurologic: Alert and oriented x3 normal cognition normal affect.        ASSESSMENT  1.-  Cirrhosis presumed secondary to nonalcoholic steatohepatitis with no evidence of gross decompensation outside of some mild peripheral edema perhaps some mild development of ascites just in the last week or so.  Patient clearly has not limited her salt intake and I think that will suffice to take care of this without the need for increasing or adding diuretics.  2.-  History of choledocholithiasis status post ERCP and common duct clearance  3.-  Asymptomatic cholelithiasis..  Questionable.  Managed conservatively.  Poor operative candidate  4.-  History of gallstone pancreatitis    PLAN  1.-  Limited salt intake patient is encouraged in fact to eat only fruits and vegetables and fresh meats without any added salt  2.-  Daily weights  3.-  Avoid addition of diuretics for now  4.-  Follow-up appointment in 2 weeks.        You have chosen to receive care through a telephone visit. Do you consent to use a telephone visit for your medical care today? Yes    Kranthi Hercules MD  8/4/2020   16:01

## 2020-08-06 PROBLEM — R60.0 MILD PERIPHERAL EDEMA: Status: ACTIVE | Noted: 2020-01-01

## 2020-08-21 NOTE — TELEPHONE ENCOUNTER
Called patients jez Guillen and notified her of  recommendations at this time.  Mrs. Guillen confirmed she understood and her sister is going to contact the patients PCP for further recommendations.

## 2020-08-21 NOTE — TELEPHONE ENCOUNTER
----- Message from Kranthi Hercules MD sent at 8/21/2020 11:24 AM EDT -----  Agree.  Limiting salt intake would not cause any skin lesions to develop.  Have PCP evaluate  ----- Message -----  From: Ananya Gonsales MA  Sent: 8/21/2020  10:56 AM EDT  To: Kranthi Hercules MD    Patients daughter Payal Guillen ([phone]) Called because her mother has had skin tenderness (especially in her legs), bumps , and redness that started last weekend. Confirmed the patients feet/leg swelling has gone down since you notified them to limit the patients salt intake and she has no swelling in her abdomen. They are wanting further recommendations from you because the daughter believes the tenderness, bumps, and redness with the patients skin started after they limited the patients salt intake as recommended during her office visit on 08/04/2020.     Notified the patients daughter that we would definitely send you a message for further recommendations as requested but they should contact the patients PCP as well because this may not be related to the patient cirrhosis or salt intake. Confirmed Payal Guillen understood and would contact the patients PCP.     Do you have any further recommendations at this time or should the patient follow up with her PCP?

## 2020-09-08 NOTE — PROGRESS NOTES
"GASTROENTEROLOGY OFFICE NOTE  Janeth Mitchell  [id number]  1935    CARE TEAM  Patient Care Team:  Alexx Tafoya MD as PCP - General (Internal Medicine)     You have chosen to receive care through a telephone visit. Do you consent to use a telephone visit for your medical care today? Yes      No ref. provider found     Chief Complaint   Patient presents with   • Follow-up   • Cirrhosis        HISTORY OF PRESENT ILLNESS:  Patient presents for follow-up of cirrhosis.    She had been noticing some peripheral edema and mild abdominal distention and was counseled to diminish her salt intake and as result has noted resolution of her edema and abdominal distention.  She is on low-dose Lasix at 20 mg daily and triamterene/hydrochlorothiazide and these doses have not been changed.    She is not reporting any localizing GI complaint specifically denying dysphagia, diet aphasia, early satiety, unexplained weight loss, melena or bright red blood per rectum.    She is lost 6 pounds since I last saw her and states that this is due to improvement of her peripheral edema.  Overall she is lacking in energy and feels \"lazy \"and sometimes feels a little wobbly.  Hemoglobin is 11.0 recent bilirubin is elevated 1.4 and albumin is depressed at 2.9.  White count is slightly depressed at 4.7 and platelet count low normal at 183,000.    Recent ultrasound revealed cystic lesion of the pancreas concerning for IPMN.    PAST MEDICAL HISTORY  Past Medical History:   Diagnosis Date   • Arthritis    • Cirrhosis (CMS/HCC)    • Hypertension    • Pancreatitis due to common bile duct stone         PAST SURGICAL HISTORY  Past Surgical History:   Procedure Laterality Date   • CERVICAL POLYPECTOMY     • ERCP N/A 10/4/2019    Procedure: ENDOSCOPIC RETROGRADE CHOLANGIOPANCREATOGRAPHY;  Surgeon: Kranthi Hercules MD;  Location: [facility] ENDOSCOPY;  Service: Gastroenterology   • ERCP N/A 10/16/2019    Procedure: ENDOSCOPIC RETROGRADE " CHOLANGIOPANCREATOGRAPHY;  Surgeon: Kranthi Hercules MD;  Location: [facility] ENDOSCOPY;  Service: Gastroenterology   • SKIN BIOPSY          MEDICATIONS:    Current Outpatient Medications:   •  aspirin 81 MG EC tablet, Take 81 mg by mouth Daily., Disp: , Rfl:   •  enalapril (VASOTEC) 20 MG tablet, TAKE ONE TABLET BY MOUTH EVERY DAY FOR BLOOD PRESSURE, Disp: , Rfl:   •  furosemide (LASIX) 20 MG tablet, Take 20 mg by mouth Daily., Disp: , Rfl:   •  potassium chloride (MICRO-K) 10 MEQ CR capsule, Take 10 mEq by mouth Daily., Disp: , Rfl:   •  triamterene-hydrochlorothiazide (MAXZIDE-25) 37.5-25 MG per tablet, TAKE ONE TABLET BY MOUTH ONE TIME DAILY FOR BLOODPRESSURE AND FLUID, Disp: , Rfl:     ALLERGIES  Allergies   Allergen Reactions   • Sulfa Antibiotics Angioedema     States she cant remember   • Sulfa Antibiotics Unknown (See Comments)     Pt unsure but states she is allergic       FAMILY HISTORY:  Family History   Problem Relation Age of Onset   • Diabetes Mother    • Cancer Mother    • Stroke Father    • Colon polyps Neg Hx    • Colon cancer Neg Hx        SOCIAL HISTORY  Social History     Socioeconomic History   • Marital status:      Spouse name: Not on file   • Number of children: Not on file   • Years of education: Not on file   • Highest education level: Not on file   Tobacco Use   • Smoking status: Never Smoker   • Smokeless tobacco: Never Used   Substance and Sexual Activity   • Alcohol use: No     Frequency: Never   • Drug use: No   • Sexual activity: Defer     Socioeconomic History:  .  Non-smoker.  Nondrinker.       REVIEW OF SYSTEMS  Review of Systems   Constitutional: Positive for fatigue. Negative for activity change, appetite change, chills, diaphoresis, fever, unexpected weight gain and unexpected weight loss.   HENT: Negative for trouble swallowing and voice change.    Gastrointestinal: Positive for abdominal pain. Negative for abdominal distention, anal bleeding, blood in  stool, constipation, diarrhea, nausea, rectal pain, vomiting, GERD and indigestion.     I reviewed the above-noted review of systems    PHYSICAL EXAM   General: Pleasant female in no apparent or acute distress  HEENT: Normal speech  Lungs: Grossly normal respirations without labored breathing or audible wheezing.  Speaking in full sentences  Neurologic: Normal cognition.  Normal affect.  Alert and oriented.  Normal speech.       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  Managed conservatively.  4.-  History of gallstone pancreatitis  5.-  Abnormal imaging study.  Pancreatic head cyst measuring about 2.9 cm identified.  Likely IPMN    PLAN  1.-  MRI to further evaluate IPMN  2.-  Continue current medications including current Lasix dose  3.-  Monitor serum liver enzymes  4.-  Follow-up appointment in 2 months      This visit has been rescheduled as a phone visit to comply with patient safety concerns in accordance with CDC recommendations. Total time of discussion was 12 minutes.      Kranthi Hercules MD  9/8/2020   15:30

## 2020-09-11 PROBLEM — D49.0 IPMN (INTRADUCTAL PAPILLARY MUCINOUS NEOPLASM): Status: ACTIVE | Noted: 2020-01-01

## 2020-10-08 NOTE — TELEPHONE ENCOUNTER
CALLED HAMZAH NANCE BACK IN REF TO FOSTER SOTO NO ANSWER AND NO VOICE MAIL. PHONE NUMBER [phone]

## 2020-11-10 NOTE — PROGRESS NOTES
GASTROENTEROLOGY TELEMEDICINE OFFICE NOTE  Janeth Mitchell  [id number]  1935    CARE TEAM  Patient Care Team:  Alexx Tafoya MD as PCP - General (Internal Medicine)    No ref. provider found     Chief Complaint   Patient presents with   • Follow-up   • Cirrhosis        HISTORY OF PRESENT ILLNESS:  Patient presents for routine follow-up.  Nonalcoholic steatohepatitis related cirrhosis.    When we last caught up with her she is having problems with peripheral edema and some mild abdominal distention.    He is continuing resolution with avoidance of salt in her diet and she presents without any decompensation of her liver disease.    She denies changes in mental status, abdominal distention, worsening peripheral edema, hematemesis melena or bright red blood per rectum.  She has noted a little bit of ankle swelling but this is not unusual for her.    There is been concern for pancreatic mass MRI unfortunately did not visualize the pancreatic duct well.    She denies dysphagia, odynophagia, early satiety, unexplained weight loss, melena or bright red blood per rectum.    PAST MEDICAL HISTORY  Past Medical History:   Diagnosis Date   • Arthritis    • Cirrhosis (CMS/HCC)    • Hypertension    • Pancreatitis due to common bile duct stone         PAST SURGICAL HISTORY  Past Surgical History:   Procedure Laterality Date   • CERVICAL POLYPECTOMY     • ERCP N/A 10/4/2019    Procedure: ENDOSCOPIC RETROGRADE CHOLANGIOPANCREATOGRAPHY;  Surgeon: Kranthi Hercules MD;  Location:  [facility] ENDOSCOPY;  Service: Gastroenterology   • ERCP N/A 10/16/2019    Procedure: ENDOSCOPIC RETROGRADE CHOLANGIOPANCREATOGRAPHY;  Surgeon: Kranthi Hercules MD;  Location:  [facility] ENDOSCOPY;  Service: Gastroenterology   • SKIN BIOPSY          MEDICATIONS:    Current Outpatient Medications:   •  aspirin 81 MG EC tablet, Take 81 mg by mouth Daily., Disp: , Rfl:   •  enalapril (VASOTEC) 20 MG tablet, TAKE ONE TABLET BY MOUTH  EVERY DAY FOR BLOOD PRESSURE, Disp: , Rfl:   •  Ferrous Sulfate (IRON PO), Take 1 tablet by mouth Daily., Disp: , Rfl:   •  furosemide (LASIX) 20 MG tablet, Take 20 mg by mouth Daily., Disp: , Rfl:   •  potassium chloride (MICRO-K) 10 MEQ CR capsule, Take 10 mEq by mouth Daily., Disp: , Rfl:   •  triamterene-hydrochlorothiazide (MAXZIDE-25) 37.5-25 MG per tablet, TAKE ONE TABLET BY MOUTH ONE TIME DAILY FOR BLOODPRESSURE AND FLUID, Disp: , Rfl:     ALLERGIES  Allergies   Allergen Reactions   • Sulfa Antibiotics Angioedema     States she cant remember   • Sulfa Antibiotics Unknown (See Comments)     Pt unsure but states she is allergic       FAMILY HISTORY:  Family History   Problem Relation Age of Onset   • Diabetes Mother    • Cancer Mother    • Stroke Father    • Colon polyps Neg Hx    • Colon cancer Neg Hx        SOCIAL HISTORY  Social History     Socioeconomic History   • Marital status:      Spouse name: Not on file   • Number of children: Not on file   • Years of education: Not on file   • Highest education level: Not on file   Tobacco Use   • Smoking status: Never Smoker   • Smokeless tobacco: Never Used   Substance and Sexual Activity   • Alcohol use: No     Frequency: Never   • Drug use: No   • Sexual activity: Defer     Socioeconomic History:  She is a  and lives alone but she spends nights at her daughter's home.  She is a non-smoker/nondrinker.       REVIEW OF SYSTEMS  Review of Systems   Constitutional: Negative for activity change, appetite change, chills, diaphoresis, fatigue, fever, unexpected weight gain and unexpected weight loss.   HENT: Negative for trouble swallowing and voice change.    Gastrointestinal: Negative for abdominal distention, abdominal pain, anal bleeding, blood in stool, constipation, diarrhea, nausea, rectal pain, vomiting, GERD and indigestion.       PHYSICAL EXAM   General: Pleasant female in no apparent or acute distress  HEENT: Normal speech  Lungs: Grossly normal  respirations without labored breathing or audible wheezing.  Speaking in full sentences  Neurologic: Normal cognition.  Normal affect.  Alert and oriented.  Normal speech.       ASSESSMENT  1.-Cirrhosis.  Stable without evidence of decompensation  2.-Questionable IPMN of the pancreatic head not discerned on recent MRI although there were some technical limitations of the study.  Repeat MRI in 6 months  3.-Asymptomatic cholelithiasis.  4.-History of gallstone pancreatitis  5.-Status post ERCP with sphincterotomy and common duct clearance of common duct stones    PLAN  1.-Recheck MRCP in 6 months  2.-Follow-up appointment in 6 months  3.-Continue low-sodium diet      Kranthi Hercules MD  11/11/2020   12:48 EST    6-minute telephone visit completed

## 2020-11-11 NOTE — PROGRESS NOTES
GASTROENTEROLOGY TELEMEDICINE OFFICE NOTE  Janeth Mitchell  [id number]  1935    CARE TEAM  Patient Care Team:  Alexx Tafoya MD as PCP - General (Internal Medicine)    No ref. provider found     Chief Complaint   Patient presents with   • Follow-up   • Cirrhosis        HISTORY OF PRESENT ILLNESS:  Patient presents for routine follow-up.  Nonalcoholic steatohepatitis related cirrhosis.    When we last caught up with her she is having problems with peripheral edema and some mild abdominal distention.    He is continuing resolution with avoidance of salt in her diet and she presents without any decompensation of her liver disease.    She denies changes in mental status, abdominal distention, worsening peripheral edema, hematemesis melena or bright red blood per rectum.  She has noted a little bit of ankle swelling but this is not unusual for her.    There is been concern for pancreatic mass MRI unfortunately did not visualize the pancreatic duct well.    She denies dysphagia, odynophagia, early satiety, unexplained weight loss, melena or bright red blood per rectum.    PAST MEDICAL HISTORY  Past Medical History:   Diagnosis Date   • Arthritis    • Cirrhosis (CMS/HCC)    • Hypertension    • Pancreatitis due to common bile duct stone         PAST SURGICAL HISTORY  Past Surgical History:   Procedure Laterality Date   • CERVICAL POLYPECTOMY     • ERCP N/A 10/4/2019    Procedure: ENDOSCOPIC RETROGRADE CHOLANGIOPANCREATOGRAPHY;  Surgeon: Kranthi Hercules MD;  Location:  [facility] ENDOSCOPY;  Service: Gastroenterology   • ERCP N/A 10/16/2019    Procedure: ENDOSCOPIC RETROGRADE CHOLANGIOPANCREATOGRAPHY;  Surgeon: Kranthi Hercules MD;  Location:  [facility] ENDOSCOPY;  Service: Gastroenterology   • SKIN BIOPSY          MEDICATIONS:    Current Outpatient Medications:   •  aspirin 81 MG EC tablet, Take 81 mg by mouth Daily., Disp: , Rfl:   •  enalapril (VASOTEC) 20 MG tablet, TAKE ONE TABLET BY MOUTH  EVERY DAY FOR BLOOD PRESSURE, Disp: , Rfl:   •  Ferrous Sulfate (IRON PO), Take 1 tablet by mouth Daily., Disp: , Rfl:   •  furosemide (LASIX) 20 MG tablet, Take 20 mg by mouth Daily., Disp: , Rfl:   •  potassium chloride (MICRO-K) 10 MEQ CR capsule, Take 10 mEq by mouth Daily., Disp: , Rfl:   •  triamterene-hydrochlorothiazide (MAXZIDE-25) 37.5-25 MG per tablet, TAKE ONE TABLET BY MOUTH ONE TIME DAILY FOR BLOODPRESSURE AND FLUID, Disp: , Rfl:     ALLERGIES  Allergies   Allergen Reactions   • Sulfa Antibiotics Angioedema     States she cant remember   • Sulfa Antibiotics Unknown (See Comments)     Pt unsure but states she is allergic       FAMILY HISTORY:  Family History   Problem Relation Age of Onset   • Diabetes Mother    • Cancer Mother    • Stroke Father    • Colon polyps Neg Hx    • Colon cancer Neg Hx        SOCIAL HISTORY  Social History     Socioeconomic History   • Marital status:      Spouse name: Not on file   • Number of children: Not on file   • Years of education: Not on file   • Highest education level: Not on file   Tobacco Use   • Smoking status: Never Smoker   • Smokeless tobacco: Never Used   Substance and Sexual Activity   • Alcohol use: No     Frequency: Never   • Drug use: No   • Sexual activity: Defer     Socioeconomic History:  She is a  and lives alone but she spends nights at her daughter's home.  She is a non-smoker/nondrinker.       REVIEW OF SYSTEMS  Review of Systems   Constitutional: Negative for activity change, appetite change, chills, diaphoresis, fatigue, fever, unexpected weight gain and unexpected weight loss.   HENT: Negative for trouble swallowing and voice change.    Gastrointestinal: Negative for abdominal distention, abdominal pain, anal bleeding, blood in stool, constipation, diarrhea, nausea, rectal pain, vomiting, GERD and indigestion.       PHYSICAL EXAM   General: Pleasant female in no apparent or acute distress  HEENT: Normal speech  Lungs: Grossly normal  respirations without labored breathing or audible wheezing.  Speaking in full sentences  Neurologic: Normal cognition.  Normal affect.  Alert and oriented.  Normal speech.       ASSESSMENT  1.-Cirrhosis.  Stable without evidence of decompensation  2.-Questionable IPMN of the pancreatic head not discerned on recent MRI although there were some technical limitations of the study.  Repeat MRI in 6 months  3.-Asymptomatic cholelithiasis.  4.-History of gallstone pancreatitis  5.-Status post ERCP with sphincterotomy and common duct clearance of common duct stones    PLAN  1.-Recheck MRCP in 6 months  2.-Follow-up appointment in 6 months  3.-Continue low-sodium diet      Kranthi Hercules MD  11/11/2020   12:48 EST    6-minute telephone visit completed

## 2021-01-01 ENCOUNTER — APPOINTMENT (OUTPATIENT)
Dept: ULTRASOUND IMAGING | Facility: HOSPITAL | Age: 86
End: 2021-01-01

## 2021-01-01 ENCOUNTER — HOSPITAL ENCOUNTER (INPATIENT)
Facility: HOSPITAL | Age: 86
LOS: 2 days | Discharge: LEFT BEFORE DISCHARGE | End: 2021-03-10
Attending: STUDENT IN AN ORGANIZED HEALTH CARE EDUCATION/TRAINING PROGRAM | Admitting: INTERNAL MEDICINE

## 2021-01-01 ENCOUNTER — HOSPITAL ENCOUNTER (INPATIENT)
Facility: HOSPITAL | Age: 86
LOS: 2 days | End: 2021-03-14
Attending: FAMILY MEDICINE | Admitting: INTERNAL MEDICINE

## 2021-01-01 ENCOUNTER — HOSPITAL ENCOUNTER (OUTPATIENT)
Dept: CT IMAGING | Facility: HOSPITAL | Age: 86
Discharge: HOME | End: 2021-02-25
Admitting: INTERNAL MEDICINE

## 2021-01-01 ENCOUNTER — TELEMEDICINE (OUTPATIENT)
Dept: GASTROENTEROLOGY | Facility: CLINIC | Age: 86
End: 2021-01-01

## 2021-01-01 ENCOUNTER — APPOINTMENT (OUTPATIENT)
Dept: CT IMAGING | Facility: HOSPITAL | Age: 86
End: 2021-01-01

## 2021-01-01 ENCOUNTER — TRANSCRIBE ORDERS (OUTPATIENT)
Dept: ADMINISTRATIVE | Facility: HOSPITAL | Age: 86
End: 2021-01-01

## 2021-01-01 ENCOUNTER — APPOINTMENT (OUTPATIENT)
Dept: CARDIOLOGY | Facility: HOSPITAL | Age: 86
End: 2021-01-01

## 2021-01-01 ENCOUNTER — APPOINTMENT (OUTPATIENT)
Dept: GENERAL RADIOLOGY | Facility: HOSPITAL | Age: 86
End: 2021-01-01

## 2021-01-01 ENCOUNTER — APPOINTMENT (OUTPATIENT)
Dept: NUCLEAR MEDICINE | Facility: HOSPITAL | Age: 86
End: 2021-01-01

## 2021-01-01 ENCOUNTER — ANESTHESIA (OUTPATIENT)
Dept: ICU | Facility: HOSPITAL | Age: 86
End: 2021-01-01

## 2021-01-01 ENCOUNTER — ANESTHESIA EVENT (OUTPATIENT)
Dept: ICU | Facility: HOSPITAL | Age: 86
End: 2021-01-01

## 2021-01-01 VITALS
RESPIRATION RATE: 30 BRPM | SYSTOLIC BLOOD PRESSURE: 49 MMHG | TEMPERATURE: 97.5 F | DIASTOLIC BLOOD PRESSURE: 24 MMHG | HEIGHT: 60 IN | OXYGEN SATURATION: 97 % | WEIGHT: 194.3 LBS | HEART RATE: 107 BPM | BODY MASS INDEX: 38.14 KG/M2

## 2021-01-01 VITALS
BODY MASS INDEX: 36.24 KG/M2 | OXYGEN SATURATION: 96 % | TEMPERATURE: 98.3 F | WEIGHT: 184.6 LBS | RESPIRATION RATE: 16 BRPM | DIASTOLIC BLOOD PRESSURE: 45 MMHG | HEART RATE: 106 BPM | SYSTOLIC BLOOD PRESSURE: 113 MMHG | HEIGHT: 60 IN

## 2021-01-01 DIAGNOSIS — K76.9 LIVER LESION: ICD-10-CM

## 2021-01-01 DIAGNOSIS — K80.20 CALCULUS OF GALLBLADDER WITHOUT CHOLECYSTITIS WITHOUT OBSTRUCTION: ICD-10-CM

## 2021-01-01 DIAGNOSIS — D49.0 IPMN (INTRADUCTAL PAPILLARY MUCINOUS NEOPLASM): ICD-10-CM

## 2021-01-01 DIAGNOSIS — A41.9 SEPSIS, DUE TO UNSPECIFIED ORGANISM, UNSPECIFIED WHETHER ACUTE ORGAN DYSFUNCTION PRESENT (HCC): ICD-10-CM

## 2021-01-01 DIAGNOSIS — N39.0 ACUTE UTI: ICD-10-CM

## 2021-01-01 DIAGNOSIS — R13.10 DYSPHAGIA, UNSPECIFIED TYPE: ICD-10-CM

## 2021-01-01 DIAGNOSIS — N17.9 AKI (ACUTE KIDNEY INJURY) (HCC): Primary | ICD-10-CM

## 2021-01-01 DIAGNOSIS — R18.8 OTHER ASCITES: ICD-10-CM

## 2021-01-01 DIAGNOSIS — D49.0 PANCREATIC NEOPLASM: Primary | ICD-10-CM

## 2021-01-01 DIAGNOSIS — D49.0 PANCREATIC NEOPLASM: ICD-10-CM

## 2021-01-01 DIAGNOSIS — J18.9 PNEUMONIA DUE TO INFECTIOUS ORGANISM, UNSPECIFIED LATERALITY, UNSPECIFIED PART OF LUNG: ICD-10-CM

## 2021-01-01 DIAGNOSIS — A41.9 SEPSIS, DUE TO UNSPECIFIED ORGANISM, UNSPECIFIED WHETHER ACUTE ORGAN DYSFUNCTION PRESENT (HCC): Primary | ICD-10-CM

## 2021-01-01 DIAGNOSIS — U07.1 COVID-19: ICD-10-CM

## 2021-01-01 DIAGNOSIS — K74.60 CIRRHOSIS OF LIVER WITHOUT ASCITES, UNSPECIFIED HEPATIC CIRRHOSIS TYPE (HCC): ICD-10-CM

## 2021-01-01 DIAGNOSIS — J90 BILATERAL PLEURAL EFFUSION: ICD-10-CM

## 2021-01-01 DIAGNOSIS — R79.89 ELEVATED LIVER FUNCTION TESTS: ICD-10-CM

## 2021-01-01 DIAGNOSIS — R74.8 ELEVATED LIVER ENZYMES: Primary | ICD-10-CM

## 2021-01-01 LAB
A-A DO2: 135.2 MMHG (ref 0–300)
A-A DO2: 147.3 MMHG (ref 0–300)
A-A DO2: 156.5 MMHG (ref 0–300)
A-A DO2: 164.8 MMHG (ref 0–300)
A-A DO2: 240.8 MMHG (ref 0–300)
A-A DO2: 264.4 MMHG (ref 0–300)
A-A DO2: 267.6 MMHG (ref 0–300)
A-A DO2: 36 MMHG (ref 0–300)
A-A DO2: 366 MMHG (ref 0–300)
A-A DO2: 45.9 MMHG (ref 0–300)
A-A DO2: 81.6 MMHG (ref 0–300)
ABO GROUP BLD: NORMAL
ABO GROUP BLD: NORMAL
ALBUMIN FLD-MCNC: <0.4 G/DL
ALBUMIN SERPL-MCNC: 1.73 G/DL (ref 3.5–5.2)
ALBUMIN SERPL-MCNC: 1.77 G/DL (ref 3.5–5.2)
ALBUMIN SERPL-MCNC: 1.93 G/DL (ref 3.5–5.2)
ALBUMIN SERPL-MCNC: 2.11 G/DL (ref 3.5–5.2)
ALBUMIN SERPL-MCNC: 2.36 G/DL (ref 3.5–5.2)
ALBUMIN SERPL-MCNC: 2.45 G/DL (ref 3.5–5.2)
ALBUMIN/GLOB SERPL: 0.5 G/DL
ALBUMIN/GLOB SERPL: 0.5 G/DL
ALBUMIN/GLOB SERPL: 0.6 G/DL
ALBUMIN/GLOB SERPL: 0.8 G/DL
ALP SERPL-CCNC: 111 U/L (ref 39–117)
ALP SERPL-CCNC: 118 U/L (ref 39–117)
ALP SERPL-CCNC: 125 U/L (ref 39–117)
ALP SERPL-CCNC: 148 U/L (ref 39–117)
ALP SERPL-CCNC: 156 U/L (ref 39–117)
ALP SERPL-CCNC: 160 U/L (ref 39–117)
ALPHA-FETOPROTEIN: 5.2 NG/ML (ref 0–8.3)
ALT SERPL W P-5'-P-CCNC: 161 U/L (ref 1–33)
ALT SERPL W P-5'-P-CCNC: 26 U/L (ref 1–33)
ALT SERPL W P-5'-P-CCNC: 27 U/L (ref 1–33)
ALT SERPL W P-5'-P-CCNC: 28 U/L (ref 1–33)
ALT SERPL W P-5'-P-CCNC: 36 U/L (ref 1–33)
ALT SERPL W P-5'-P-CCNC: 89 U/L (ref 1–33)
AMMONIA BLD-SCNC: 54 UMOL/L (ref 11–51)
AMMONIA BLD-SCNC: 67 UMOL/L (ref 11–51)
AMMONIA BLD-SCNC: 80 UMOL/L (ref 11–51)
AMORPH URATE CRY URNS QL MICRO: ABNORMAL /HPF
AMYLASE FLD-CCNC: 6 U/L
ANION GAP SERPL CALCULATED.3IONS-SCNC: 10.4 MMOL/L (ref 5–15)
ANION GAP SERPL CALCULATED.3IONS-SCNC: 11.1 MMOL/L (ref 5–15)
ANION GAP SERPL CALCULATED.3IONS-SCNC: 14.2 MMOL/L (ref 5–15)
ANION GAP SERPL CALCULATED.3IONS-SCNC: 17.8 MMOL/L (ref 5–15)
ANION GAP SERPL CALCULATED.3IONS-SCNC: 20.7 MMOL/L (ref 5–15)
ANION GAP SERPL CALCULATED.3IONS-SCNC: 23.5 MMOL/L (ref 5–15)
ANION GAP SERPL CALCULATED.3IONS-SCNC: 25.3 MMOL/L (ref 5–15)
ANION GAP SERPL CALCULATED.3IONS-SCNC: 27.6 MMOL/L (ref 5–15)
ANION GAP SERPL CALCULATED.3IONS-SCNC: 27.7 MMOL/L (ref 5–15)
ANION GAP SERPL CALCULATED.3IONS-SCNC: 28.2 MMOL/L (ref 5–15)
ANION GAP SERPL CALCULATED.3IONS-SCNC: 29.1 MMOL/L (ref 5–15)
ANION GAP SERPL CALCULATED.3IONS-SCNC: 7.7 MMOL/L (ref 5–15)
ANION GAP SERPL CALCULATED.3IONS-SCNC: 8.3 MMOL/L (ref 5–15)
ANISOCYTOSIS BLD QL: ABNORMAL
APPEARANCE FLD: ABNORMAL
APTT PPP: 34.4 SECONDS (ref 25.6–35.3)
APTT PPP: 41.3 SECONDS (ref 25.6–35.3)
APTT PPP: 59.8 SECONDS (ref 25.6–35.3)
APTT PPP: 73.2 SECONDS (ref 25.6–35.3)
APTT PPP: 96.1 SECONDS (ref 25.6–35.3)
ARTERIAL PATENCY WRIST A: ABNORMAL
ARTERIAL PATENCY WRIST A: POSITIVE
AST SERPL-CCNC: 290 U/L (ref 1–32)
AST SERPL-CCNC: 606 U/L (ref 1–32)
AST SERPL-CCNC: 62 U/L (ref 1–32)
AST SERPL-CCNC: 72 U/L (ref 1–32)
AST SERPL-CCNC: 74 U/L (ref 1–32)
AST SERPL-CCNC: 80 U/L (ref 1–32)
ATMOSPHERIC PRESS: 732 MMHG
ATMOSPHERIC PRESS: 732 MMHG
ATMOSPHERIC PRESS: 733 MMHG
ATMOSPHERIC PRESS: 734 MMHG
ATMOSPHERIC PRESS: 734 MMHG
ATMOSPHERIC PRESS: 735 MMHG
ATMOSPHERIC PRESS: 735 MMHG
ATMOSPHERIC PRESS: 736 MMHG
ATMOSPHERIC PRESS: 737 MMHG
B PARAPERT DNA SPEC QL NAA+PROBE: NOT DETECTED
B PERT DNA SPEC QL NAA+PROBE: NOT DETECTED
BACTERIA SPEC AEROBE CULT: NO GROWTH
BACTERIA SPEC AEROBE CULT: NORMAL
BACTERIA SPEC AEROBE CULT: NORMAL
BACTERIA UR QL AUTO: ABNORMAL /HPF
BACTERIA UR QL AUTO: ABNORMAL /HPF
BASE EXCESS BLDA CALC-SCNC: -10.9 MMOL/L (ref 0–2)
BASE EXCESS BLDA CALC-SCNC: -11.8 MMOL/L (ref 0–2)
BASE EXCESS BLDA CALC-SCNC: -12.6 MMOL/L (ref 0–2)
BASE EXCESS BLDA CALC-SCNC: -12.7 MMOL/L (ref 0–2)
BASE EXCESS BLDA CALC-SCNC: -14.3 MMOL/L (ref 0–2)
BASE EXCESS BLDA CALC-SCNC: -15.6 MMOL/L (ref 0–2)
BASE EXCESS BLDA CALC-SCNC: -16.2 MMOL/L (ref 0–2)
BASE EXCESS BLDA CALC-SCNC: -16.6 MMOL/L (ref 0–2)
BASE EXCESS BLDA CALC-SCNC: -16.9 MMOL/L (ref 0–2)
BASE EXCESS BLDA CALC-SCNC: -3.2 MMOL/L (ref 0–2)
BASE EXCESS BLDA CALC-SCNC: 0.3 MMOL/L (ref 0–2)
BASOPHILS # BLD AUTO: 0.04 10*3/MM3 (ref 0–0.2)
BASOPHILS # BLD AUTO: 0.06 10*3/MM3 (ref 0–0.2)
BASOPHILS # BLD AUTO: 0.07 10*3/MM3 (ref 0–0.2)
BASOPHILS # BLD AUTO: 0.09 10*3/MM3 (ref 0–0.2)
BASOPHILS NFR BLD AUTO: 0.2 % (ref 0–1.5)
BASOPHILS NFR BLD AUTO: 0.4 % (ref 0–1.5)
BASOPHILS NFR BLD AUTO: 0.6 % (ref 0–1.5)
BASOPHILS NFR BLD AUTO: 0.7 % (ref 0–1.5)
BDY SITE: ABNORMAL
BH BB BLOOD EXPIRATION DATE: NORMAL
BH BB BLOOD TYPE BARCODE: 6200
BH BB BLOOD TYPE BARCODE: 7300
BH BB BLOOD TYPE BARCODE: 8400
BH BB DISPENSE STATUS: NORMAL
BH BB PRODUCT CODE: NORMAL
BH BB UNIT NUMBER: NORMAL
BH CV ECHO MEAS - % IVS THICK: 41.9 %
BH CV ECHO MEAS - % LVPW THICK: 28.5 %
BH CV ECHO MEAS - ACS: 1.4 CM
BH CV ECHO MEAS - AO MAX PG: 8.1 MMHG
BH CV ECHO MEAS - AO MEAN PG: 3 MMHG
BH CV ECHO MEAS - AO ROOT AREA (BSA CORRECTED): 1.4
BH CV ECHO MEAS - AO ROOT AREA: 5.1 CM^2
BH CV ECHO MEAS - AO ROOT DIAM: 2.6 CM
BH CV ECHO MEAS - AO V2 MAX: 142 CM/SEC
BH CV ECHO MEAS - AO V2 MEAN: 86.2 CM/SEC
BH CV ECHO MEAS - AO V2 VTI: 21.5 CM
BH CV ECHO MEAS - BSA(HAYCOCK): 2 M^2
BH CV ECHO MEAS - BSA: 1.8 M^2
BH CV ECHO MEAS - BZI_BMI: 37.9 KILOGRAMS/M^2
BH CV ECHO MEAS - BZI_METRIC_HEIGHT: 152.4 CM
BH CV ECHO MEAS - BZI_METRIC_WEIGHT: 88 KG
BH CV ECHO MEAS - EDV(CUBED): 80.6 ML
BH CV ECHO MEAS - EDV(MOD-SP4): 45.9 ML
BH CV ECHO MEAS - EDV(TEICH): 84 ML
BH CV ECHO MEAS - EF(CUBED): 85.4 %
BH CV ECHO MEAS - EF(MOD-SP4): 74.5 %
BH CV ECHO MEAS - EF(TEICH): 79 %
BH CV ECHO MEAS - ESV(CUBED): 11.8 ML
BH CV ECHO MEAS - ESV(MOD-SP4): 11.7 ML
BH CV ECHO MEAS - ESV(TEICH): 17.6 ML
BH CV ECHO MEAS - FS: 47.3 %
BH CV ECHO MEAS - IVS/LVPW: 0.83
BH CV ECHO MEAS - IVSD: 0.81 CM
BH CV ECHO MEAS - IVSS: 1.2 CM
BH CV ECHO MEAS - LA DIMENSION: 2.7 CM
BH CV ECHO MEAS - LA/AO: 1.1
BH CV ECHO MEAS - LV DIASTOLIC VOL/BSA (35-75): 24.9 ML/M^2
BH CV ECHO MEAS - LV MASS(C)D: 122.7 GRAMS
BH CV ECHO MEAS - LV MASS(C)DI: 66.6 GRAMS/M^2
BH CV ECHO MEAS - LV MASS(C)S: 75.8 GRAMS
BH CV ECHO MEAS - LV MASS(C)SI: 41.1 GRAMS/M^2
BH CV ECHO MEAS - LV SYSTOLIC VOL/BSA (12-30): 6.3 ML/M^2
BH CV ECHO MEAS - LVIDD: 4.3 CM
BH CV ECHO MEAS - LVIDS: 2.3 CM
BH CV ECHO MEAS - LVLD AP4: 5.6 CM
BH CV ECHO MEAS - LVLS AP4: 4.8 CM
BH CV ECHO MEAS - LVOT AREA (M): 2.5 CM^2
BH CV ECHO MEAS - LVOT AREA: 2.5 CM^2
BH CV ECHO MEAS - LVOT DIAM: 1.8 CM
BH CV ECHO MEAS - LVPWD: 0.97 CM
BH CV ECHO MEAS - LVPWS: 1.3 CM
BH CV ECHO MEAS - MV A MAX VEL: 44 CM/SEC
BH CV ECHO MEAS - MV E MAX VEL: 120 CM/SEC
BH CV ECHO MEAS - MV E/A: 2.7
BH CV ECHO MEAS - PA ACC TIME: 0.08 SEC
BH CV ECHO MEAS - PA PR(ACCEL): 44.4 MMHG
BH CV ECHO MEAS - RAP SYSTOLE: 10 MMHG
BH CV ECHO MEAS - RVSP: 38.9 MMHG
BH CV ECHO MEAS - SI(AO): 59.6 ML/M^2
BH CV ECHO MEAS - SI(CUBED): 37.4 ML/M^2
BH CV ECHO MEAS - SI(MOD-SP4): 18.6 ML/M^2
BH CV ECHO MEAS - SI(TEICH): 36 ML/M^2
BH CV ECHO MEAS - SV(AO): 109.8 ML
BH CV ECHO MEAS - SV(CUBED): 68.8 ML
BH CV ECHO MEAS - SV(MOD-SP4): 34.2 ML
BH CV ECHO MEAS - SV(TEICH): 66.4 ML
BH CV ECHO MEAS - TR MAX VEL: 269 CM/SEC
BILIRUB SERPL-MCNC: 2 MG/DL (ref 0–1.2)
BILIRUB SERPL-MCNC: 2.1 MG/DL (ref 0–1.2)
BILIRUB SERPL-MCNC: 2.2 MG/DL (ref 0–1.2)
BILIRUB SERPL-MCNC: 2.2 MG/DL (ref 0–1.2)
BILIRUB SERPL-MCNC: 2.4 MG/DL (ref 0–1.2)
BILIRUB SERPL-MCNC: 2.7 MG/DL (ref 0–1.2)
BILIRUB UR QL STRIP: ABNORMAL
BILIRUB UR QL STRIP: ABNORMAL
BLD GP AB SCN SERPL QL: NEGATIVE
BODY TEMPERATURE: 0 C
BUN SERPL-MCNC: 27 MG/DL (ref 8–23)
BUN SERPL-MCNC: 34 MG/DL (ref 8–23)
BUN SERPL-MCNC: 50 MG/DL (ref 8–23)
BUN SERPL-MCNC: 69 MG/DL (ref 8–23)
BUN SERPL-MCNC: 71 MG/DL (ref 8–23)
BUN SERPL-MCNC: 72 MG/DL (ref 8–23)
BUN SERPL-MCNC: 74 MG/DL (ref 8–23)
BUN SERPL-MCNC: 75 MG/DL (ref 8–23)
BUN SERPL-MCNC: 76 MG/DL (ref 8–23)
BUN SERPL-MCNC: 78 MG/DL (ref 8–23)
BUN SERPL-MCNC: 83 MG/DL (ref 8–23)
BUN SERPL-MCNC: 84 MG/DL (ref 8–23)
BUN SERPL-MCNC: 85 MG/DL (ref 8–23)
BUN/CREAT SERPL: 20.8 (ref 7–25)
BUN/CREAT SERPL: 22.1 (ref 7–25)
BUN/CREAT SERPL: 22.3 (ref 7–25)
BUN/CREAT SERPL: 22.9 (ref 7–25)
BUN/CREAT SERPL: 23.1 (ref 7–25)
BUN/CREAT SERPL: 23.1 (ref 7–25)
BUN/CREAT SERPL: 23.8 (ref 7–25)
BUN/CREAT SERPL: 24.1 (ref 7–25)
BUN/CREAT SERPL: 24.1 (ref 7–25)
BUN/CREAT SERPL: 24.6 (ref 7–25)
BUN/CREAT SERPL: 26.7 (ref 7–25)
BUN/CREAT SERPL: 32.1 (ref 7–25)
BUN/CREAT SERPL: 32.7 (ref 7–25)
BURR CELLS BLD QL SMEAR: ABNORMAL
C PNEUM DNA NPH QL NAA+NON-PROBE: NOT DETECTED
CA-I SERPL ISE-MCNC: 1 MMOL/L (ref 1.12–1.32)
CALCIUM SPEC-SCNC: 6.8 MG/DL (ref 8.6–10.5)
CALCIUM SPEC-SCNC: 6.8 MG/DL (ref 8.6–10.5)
CALCIUM SPEC-SCNC: 7.2 MG/DL (ref 8.6–10.5)
CALCIUM SPEC-SCNC: 7.5 MG/DL (ref 8.6–10.5)
CALCIUM SPEC-SCNC: 7.6 MG/DL (ref 8.6–10.5)
CALCIUM SPEC-SCNC: 7.7 MG/DL (ref 8.6–10.5)
CALCIUM SPEC-SCNC: 7.9 MG/DL (ref 8.6–10.5)
CALCIUM SPEC-SCNC: 8 MG/DL (ref 8.6–10.5)
CALCIUM SPEC-SCNC: 8 MG/DL (ref 8.6–10.5)
CALCIUM SPEC-SCNC: 8.6 MG/DL (ref 8.6–10.5)
CALCIUM SPEC-SCNC: 9 MG/DL (ref 8.6–10.5)
CALCIUM SPEC-SCNC: 9.6 MG/DL (ref 8.6–10.5)
CALCIUM SPEC-SCNC: 9.9 MG/DL (ref 8.6–10.5)
CANCER AG19-9 SERPL-ACNC: 18.2 U/ML
CEA SERPL-MCNC: 5.7 NG/ML
CHLORIDE SERPL-SCNC: 105 MMOL/L (ref 98–107)
CHLORIDE SERPL-SCNC: 107 MMOL/L (ref 98–107)
CHLORIDE SERPL-SCNC: 107 MMOL/L (ref 98–107)
CHLORIDE SERPL-SCNC: 108 MMOL/L (ref 98–107)
CHLORIDE SERPL-SCNC: 109 MMOL/L (ref 98–107)
CLARITY UR: ABNORMAL
CLARITY UR: ABNORMAL
CO2 BLDA-SCNC: 11.3 MMOL/L (ref 22–33)
CO2 BLDA-SCNC: 11.3 MMOL/L (ref 22–33)
CO2 BLDA-SCNC: 12.8 MMOL/L (ref 22–33)
CO2 BLDA-SCNC: 13.4 MMOL/L (ref 22–33)
CO2 BLDA-SCNC: 13.7 MMOL/L (ref 22–33)
CO2 BLDA-SCNC: 14.5 MMOL/L (ref 22–33)
CO2 BLDA-SCNC: 14.6 MMOL/L (ref 22–33)
CO2 BLDA-SCNC: 14.7 MMOL/L (ref 22–33)
CO2 BLDA-SCNC: 15.1 MMOL/L (ref 22–33)
CO2 BLDA-SCNC: 22.3 MMOL/L (ref 22–33)
CO2 BLDA-SCNC: 24.1 MMOL/L (ref 22–33)
CO2 SERPL-SCNC: 10.3 MMOL/L (ref 22–29)
CO2 SERPL-SCNC: 10.9 MMOL/L (ref 22–29)
CO2 SERPL-SCNC: 11.4 MMOL/L (ref 22–29)
CO2 SERPL-SCNC: 12.2 MMOL/L (ref 22–29)
CO2 SERPL-SCNC: 12.3 MMOL/L (ref 22–29)
CO2 SERPL-SCNC: 13.5 MMOL/L (ref 22–29)
CO2 SERPL-SCNC: 13.8 MMOL/L (ref 22–29)
CO2 SERPL-SCNC: 14.8 MMOL/L (ref 22–29)
CO2 SERPL-SCNC: 16.7 MMOL/L (ref 22–29)
CO2 SERPL-SCNC: 19.3 MMOL/L (ref 22–29)
CO2 SERPL-SCNC: 19.9 MMOL/L (ref 22–29)
CO2 SERPL-SCNC: 20.6 MMOL/L (ref 22–29)
CO2 SERPL-SCNC: 22.7 MMOL/L (ref 22–29)
COHGB MFR BLD: 1 % (ref 0–5)
COHGB MFR BLD: 1.1 % (ref 0–5)
COHGB MFR BLD: 1.2 % (ref 0–5)
COHGB MFR BLD: 1.2 % (ref 0–5)
COHGB MFR BLD: <1 % (ref 0–5)
COLOR FLD: YELLOW
COLOR UR: ABNORMAL
COLOR UR: ABNORMAL
CREAT BLDA-MCNC: 0.7 MG/DL (ref 0.6–1.3)
CREAT SERPL-MCNC: 1.01 MG/DL (ref 0.57–1)
CREAT SERPL-MCNC: 1.06 MG/DL (ref 0.57–1)
CREAT SERPL-MCNC: 1.53 MG/DL (ref 0.57–1)
CREAT SERPL-MCNC: 3.19 MG/DL (ref 0.57–1)
CREAT SERPL-MCNC: 3.2 MG/DL (ref 0.57–1)
CREAT SERPL-MCNC: 3.21 MG/DL (ref 0.57–1)
CREAT SERPL-MCNC: 3.23 MG/DL (ref 0.57–1)
CREAT SERPL-MCNC: 3.24 MG/DL (ref 0.57–1)
CREAT SERPL-MCNC: 3.32 MG/DL (ref 0.57–1)
CREAT SERPL-MCNC: 3.41 MG/DL (ref 0.57–1)
CREAT SERPL-MCNC: 3.44 MG/DL (ref 0.57–1)
CREAT SERPL-MCNC: 3.46 MG/DL (ref 0.57–1)
CREAT SERPL-MCNC: 3.48 MG/DL (ref 0.57–1)
CROSSMATCH INTERPRETATION: NORMAL
CROSSMATCH INTERPRETATION: NORMAL
CRP SERPL-MCNC: 10.32 MG/DL (ref 0–0.5)
CRP SERPL-MCNC: 3.61 MG/DL (ref 0–0.5)
CRP SERPL-MCNC: 6.34 MG/DL (ref 0–0.5)
CRP SERPL-MCNC: 6.56 MG/DL (ref 0–0.5)
CRP SERPL-MCNC: 6.69 MG/DL (ref 0–0.5)
D DIMER PPP FEU-MCNC: >20 MCGFEU/ML (ref 0–0.5)
D DIMER PPP FEU-MCNC: >20 MCGFEU/ML (ref 0–0.5)
D-LACTATE SERPL-SCNC: 10.7 MMOL/L (ref 0.5–2)
D-LACTATE SERPL-SCNC: 13.5 MMOL/L (ref 0.5–2)
D-LACTATE SERPL-SCNC: 13.6 MMOL/L (ref 0.5–2)
D-LACTATE SERPL-SCNC: 14 MMOL/L (ref 0.5–2)
D-LACTATE SERPL-SCNC: 14.1 MMOL/L (ref 0.5–2)
D-LACTATE SERPL-SCNC: 2.3 MMOL/L (ref 0.5–2)
D-LACTATE SERPL-SCNC: 3.1 MMOL/L (ref 0.5–2)
D-LACTATE SERPL-SCNC: 3.2 MMOL/L (ref 0.5–2)
D-LACTATE SERPL-SCNC: 3.2 MMOL/L (ref 0.5–2)
D-LACTATE SERPL-SCNC: 3.3 MMOL/L (ref 0.5–2)
D-LACTATE SERPL-SCNC: 3.6 MMOL/L (ref 0.5–2)
D-LACTATE SERPL-SCNC: 7.3 MMOL/L (ref 0.5–2)
D-LACTATE SERPL-SCNC: 8.8 MMOL/L (ref 0.5–2)
D-LACTATE SERPL-SCNC: 9.5 MMOL/L (ref 0.5–2)
DEPRECATED RDW RBC AUTO: 62.4 FL (ref 37–54)
DEPRECATED RDW RBC AUTO: 67.2 FL (ref 37–54)
DEPRECATED RDW RBC AUTO: 68.9 FL (ref 37–54)
DEPRECATED RDW RBC AUTO: 69.7 FL (ref 37–54)
DEPRECATED RDW RBC AUTO: 70.1 FL (ref 37–54)
DEPRECATED RDW RBC AUTO: 70.2 FL (ref 37–54)
DEPRECATED RDW RBC AUTO: 72.8 FL (ref 37–54)
DEPRECATED RDW RBC AUTO: 76.2 FL (ref 37–54)
EOSINOPHIL # BLD AUTO: 0.09 10*3/MM3 (ref 0–0.4)
EOSINOPHIL # BLD AUTO: 0.1 10*3/MM3 (ref 0–0.4)
EOSINOPHIL # BLD AUTO: 0.21 10*3/MM3 (ref 0–0.4)
EOSINOPHIL # BLD AUTO: 0.25 10*3/MM3 (ref 0–0.4)
EOSINOPHIL # BLD MANUAL: 0.43 10*3/MM3 (ref 0–0.4)
EOSINOPHIL NFR BLD AUTO: 0.5 % (ref 0.3–6.2)
EOSINOPHIL NFR BLD AUTO: 0.8 % (ref 0.3–6.2)
EOSINOPHIL NFR BLD AUTO: 1.6 % (ref 0.3–6.2)
EOSINOPHIL NFR BLD AUTO: 2 % (ref 0.3–6.2)
EOSINOPHIL NFR BLD MANUAL: 3 % (ref 0.3–6.2)
ERYTHROCYTE [DISTWIDTH] IN BLOOD BY AUTOMATED COUNT: 18.6 % (ref 12.3–15.4)
ERYTHROCYTE [DISTWIDTH] IN BLOOD BY AUTOMATED COUNT: 18.8 % (ref 12.3–15.4)
ERYTHROCYTE [DISTWIDTH] IN BLOOD BY AUTOMATED COUNT: 18.8 % (ref 12.3–15.4)
ERYTHROCYTE [DISTWIDTH] IN BLOOD BY AUTOMATED COUNT: 19.4 % (ref 12.3–15.4)
ERYTHROCYTE [DISTWIDTH] IN BLOOD BY AUTOMATED COUNT: 19.4 % (ref 12.3–15.4)
ERYTHROCYTE [DISTWIDTH] IN BLOOD BY AUTOMATED COUNT: 19.6 % (ref 12.3–15.4)
ERYTHROCYTE [DISTWIDTH] IN BLOOD BY AUTOMATED COUNT: 19.6 % (ref 12.3–15.4)
ERYTHROCYTE [DISTWIDTH] IN BLOOD BY AUTOMATED COUNT: 19.7 % (ref 12.3–15.4)
FERRITIN SERPL-MCNC: 1173 NG/ML (ref 13–150)
FERRITIN SERPL-MCNC: 280.3 NG/ML (ref 13–150)
FIBRINOGEN PPP-MCNC: 103 MG/DL (ref 173–524)
FIBRINOGEN PPP-MCNC: 145 MG/DL (ref 173–524)
FLUAV RNA RESP QL NAA+PROBE: NOT DETECTED
FLUAV SUBTYP SPEC NAA+PROBE: NOT DETECTED
FLUBV RNA ISLT QL NAA+PROBE: NOT DETECTED
FLUBV RNA RESP QL NAA+PROBE: NOT DETECTED
FOLATE SERPL-MCNC: 5.73 NG/ML (ref 4.78–24.2)
GAS FLOW AIRWAY: 2 LPM
GAS FLOW AIRWAY: 4 LPM
GFR SERPL CREATININE-BSD FRML MDRD: 13 ML/MIN/1.73
GFR SERPL CREATININE-BSD FRML MDRD: 14 ML/MIN/1.73
GFR SERPL CREATININE-BSD FRML MDRD: 32 ML/MIN/1.73
GFR SERPL CREATININE-BSD FRML MDRD: 49 ML/MIN/1.73
GFR SERPL CREATININE-BSD FRML MDRD: 52 ML/MIN/1.73
GFR SERPL CREATININE-BSD FRML MDRD: ABNORMAL ML/MIN/{1.73_M2}
GLOBULIN UR ELPH-MCNC: 3.2 GM/DL
GLOBULIN UR ELPH-MCNC: 3.2 GM/DL
GLOBULIN UR ELPH-MCNC: 3.4 GM/DL
GLOBULIN UR ELPH-MCNC: 3.5 GM/DL
GLOBULIN UR ELPH-MCNC: 3.6 GM/DL
GLOBULIN UR ELPH-MCNC: 3.7 GM/DL
GLUCOSE BLDC GLUCOMTR-MCNC: 104 MG/DL (ref 70–130)
GLUCOSE BLDC GLUCOMTR-MCNC: 116 MG/DL (ref 70–130)
GLUCOSE BLDC GLUCOMTR-MCNC: 127 MG/DL (ref 70–130)
GLUCOSE BLDC GLUCOMTR-MCNC: 48 MG/DL (ref 70–130)
GLUCOSE BLDC GLUCOMTR-MCNC: 59 MG/DL (ref 70–130)
GLUCOSE BLDC GLUCOMTR-MCNC: 74 MG/DL (ref 70–130)
GLUCOSE BLDC GLUCOMTR-MCNC: 86 MG/DL (ref 70–130)
GLUCOSE BLDC GLUCOMTR-MCNC: 88 MG/DL (ref 70–130)
GLUCOSE FLD-MCNC: 80 MG/DL
GLUCOSE SERPL-MCNC: 108 MG/DL (ref 65–99)
GLUCOSE SERPL-MCNC: 109 MG/DL (ref 65–99)
GLUCOSE SERPL-MCNC: 131 MG/DL (ref 65–99)
GLUCOSE SERPL-MCNC: 132 MG/DL (ref 65–99)
GLUCOSE SERPL-MCNC: 140 MG/DL (ref 65–99)
GLUCOSE SERPL-MCNC: 158 MG/DL (ref 65–99)
GLUCOSE SERPL-MCNC: 204 MG/DL (ref 65–99)
GLUCOSE SERPL-MCNC: 277 MG/DL (ref 65–99)
GLUCOSE SERPL-MCNC: 47 MG/DL (ref 65–99)
GLUCOSE SERPL-MCNC: 57 MG/DL (ref 65–99)
GLUCOSE SERPL-MCNC: 79 MG/DL (ref 65–99)
GLUCOSE SERPL-MCNC: 87 MG/DL (ref 65–99)
GLUCOSE SERPL-MCNC: 90 MG/DL (ref 65–99)
GLUCOSE UR STRIP-MCNC: ABNORMAL MG/DL
GLUCOSE UR STRIP-MCNC: NEGATIVE MG/DL
HADV DNA SPEC NAA+PROBE: NOT DETECTED
HCO3 BLDA-SCNC: 10.5 MMOL/L (ref 20–26)
HCO3 BLDA-SCNC: 10.5 MMOL/L (ref 20–26)
HCO3 BLDA-SCNC: 11.8 MMOL/L (ref 20–26)
HCO3 BLDA-SCNC: 12.2 MMOL/L (ref 20–26)
HCO3 BLDA-SCNC: 12.7 MMOL/L (ref 20–26)
HCO3 BLDA-SCNC: 13.5 MMOL/L (ref 20–26)
HCO3 BLDA-SCNC: 13.7 MMOL/L (ref 20–26)
HCO3 BLDA-SCNC: 13.8 MMOL/L (ref 20–26)
HCO3 BLDA-SCNC: 14 MMOL/L (ref 20–26)
HCO3 BLDA-SCNC: 21.2 MMOL/L (ref 20–26)
HCO3 BLDA-SCNC: 23.2 MMOL/L (ref 20–26)
HCOV 229E RNA SPEC QL NAA+PROBE: NOT DETECTED
HCOV HKU1 RNA SPEC QL NAA+PROBE: NOT DETECTED
HCOV NL63 RNA SPEC QL NAA+PROBE: NOT DETECTED
HCOV OC43 RNA SPEC QL NAA+PROBE: NOT DETECTED
HCT VFR BLD AUTO: 34.8 % (ref 34–46.6)
HCT VFR BLD AUTO: 36.5 % (ref 34–46.6)
HCT VFR BLD AUTO: 36.5 % (ref 34–46.6)
HCT VFR BLD AUTO: 37.4 % (ref 34–46.6)
HCT VFR BLD AUTO: 37.6 % (ref 34–46.6)
HCT VFR BLD AUTO: 37.7 % (ref 34–46.6)
HCT VFR BLD AUTO: 37.9 % (ref 34–46.6)
HCT VFR BLD AUTO: 39.5 % (ref 34–46.6)
HCT VFR BLD AUTO: 39.6 % (ref 34–46.6)
HCT VFR BLD AUTO: 39.9 % (ref 34–46.6)
HCT VFR BLD AUTO: 40.1 % (ref 34–46.6)
HCT VFR BLD AUTO: 40.8 % (ref 34–46.6)
HCT VFR BLD AUTO: 41.5 % (ref 34–46.6)
HCT VFR BLD AUTO: 50.9 % (ref 34–46.6)
HCT VFR BLD CALC: 34.1 % (ref 38–51)
HCT VFR BLD CALC: 35.7 % (ref 38–51)
HCT VFR BLD CALC: 37.8 % (ref 38–51)
HCT VFR BLD CALC: 38 % (ref 38–51)
HCT VFR BLD CALC: 38.7 % (ref 38–51)
HCT VFR BLD CALC: 39.9 % (ref 38–51)
HCT VFR BLD CALC: 40.9 % (ref 38–51)
HCT VFR BLD CALC: 42 % (ref 38–51)
HCT VFR BLD CALC: 42.4 % (ref 38–51)
HCT VFR BLD CALC: 43.2 % (ref 38–51)
HCT VFR BLD CALC: 43.5 % (ref 38–51)
HGB BLD-MCNC: 11.3 G/DL (ref 12–15.9)
HGB BLD-MCNC: 11.9 G/DL (ref 12–15.9)
HGB BLD-MCNC: 12 G/DL (ref 12–15.9)
HGB BLD-MCNC: 12.1 G/DL (ref 12–15.9)
HGB BLD-MCNC: 12.2 G/DL (ref 12–15.9)
HGB BLD-MCNC: 12.2 G/DL (ref 12–15.9)
HGB BLD-MCNC: 12.3 G/DL (ref 12–15.9)
HGB BLD-MCNC: 12.3 G/DL (ref 12–15.9)
HGB BLD-MCNC: 12.9 G/DL (ref 12–15.9)
HGB BLD-MCNC: 13 G/DL (ref 12–15.9)
HGB BLD-MCNC: 13 G/DL (ref 12–15.9)
HGB BLD-MCNC: 13.5 G/DL (ref 12–15.9)
HGB BLD-MCNC: 14 G/DL (ref 12–15.9)
HGB BLD-MCNC: 16.5 G/DL (ref 12–15.9)
HGB BLDA-MCNC: 11.1 G/DL (ref 13.5–17.5)
HGB BLDA-MCNC: 11.6 G/DL (ref 13.5–17.5)
HGB BLDA-MCNC: 12.3 G/DL (ref 13.5–17.5)
HGB BLDA-MCNC: 12.4 G/DL (ref 13.5–17.5)
HGB BLDA-MCNC: 12.6 G/DL (ref 13.5–17.5)
HGB BLDA-MCNC: 13 G/DL (ref 13.5–17.5)
HGB BLDA-MCNC: 13.4 G/DL (ref 13.5–17.5)
HGB BLDA-MCNC: 13.7 G/DL (ref 13.5–17.5)
HGB BLDA-MCNC: 13.8 G/DL (ref 13.5–17.5)
HGB BLDA-MCNC: 14.1 G/DL (ref 13.5–17.5)
HGB BLDA-MCNC: 14.2 G/DL (ref 13.5–17.5)
HGB UR QL STRIP.AUTO: ABNORMAL
HGB UR QL STRIP.AUTO: ABNORMAL
HMPV RNA NPH QL NAA+NON-PROBE: NOT DETECTED
HOLD SPECIMEN: NORMAL
HOLD SPECIMEN: NORMAL
HPIV1 RNA SPEC QL NAA+PROBE: NOT DETECTED
HPIV2 RNA SPEC QL NAA+PROBE: NOT DETECTED
HPIV3 RNA NPH QL NAA+PROBE: NOT DETECTED
HPIV4 P GENE NPH QL NAA+PROBE: NOT DETECTED
HYALINE CASTS UR QL AUTO: ABNORMAL /LPF
HYALINE CASTS UR QL AUTO: ABNORMAL /LPF
IMM GRANULOCYTES # BLD AUTO: 0.12 10*3/MM3 (ref 0–0.05)
IMM GRANULOCYTES # BLD AUTO: 0.23 10*3/MM3 (ref 0–0.05)
IMM GRANULOCYTES # BLD AUTO: 0.31 10*3/MM3 (ref 0–0.05)
IMM GRANULOCYTES # BLD AUTO: 0.49 10*3/MM3 (ref 0–0.05)
IMM GRANULOCYTES NFR BLD AUTO: 0.6 % (ref 0–0.5)
IMM GRANULOCYTES NFR BLD AUTO: 1.7 % (ref 0–0.5)
IMM GRANULOCYTES NFR BLD AUTO: 2.5 % (ref 0–0.5)
IMM GRANULOCYTES NFR BLD AUTO: 3.9 % (ref 0–0.5)
INHALED O2 CONCENTRATION: 100 %
INHALED O2 CONCENTRATION: 21 %
INHALED O2 CONCENTRATION: 21 %
INHALED O2 CONCENTRATION: 28 %
INHALED O2 CONCENTRATION: 36 %
INHALED O2 CONCENTRATION: 40 %
INHALED O2 CONCENTRATION: 60 %
INR PPP: 1.62 (ref 0.9–1.1)
INR PPP: 2.67 (ref 0.9–1.1)
INR PPP: 4.28 (ref 0.9–1.1)
IRON 24H UR-MRATE: 71 MCG/DL (ref 37–145)
IRON SATN MFR SERPL: 54 % (ref 20–50)
KETONES UR QL STRIP: ABNORMAL
KETONES UR QL STRIP: NEGATIVE
LARGE PLATELETS: ABNORMAL
LDH FLD-CCNC: 292 U/L
LDH SERPL-CCNC: 332 U/L (ref 135–214)
LDH SERPL-CCNC: 770 U/L (ref 135–214)
LEUKOCYTE ESTERASE UR QL STRIP.AUTO: ABNORMAL
LEUKOCYTE ESTERASE UR QL STRIP.AUTO: ABNORMAL
LIPASE SERPL-CCNC: 16 U/L (ref 13–60)
LYMPHOCYTES # BLD AUTO: 0.88 10*3/MM3 (ref 0.7–3.1)
LYMPHOCYTES # BLD AUTO: 0.9 10*3/MM3 (ref 0.7–3.1)
LYMPHOCYTES # BLD AUTO: 1.04 10*3/MM3 (ref 0.7–3.1)
LYMPHOCYTES # BLD AUTO: 1.12 10*3/MM3 (ref 0.7–3.1)
LYMPHOCYTES # BLD MANUAL: 0.83 10*3/MM3 (ref 0.7–3.1)
LYMPHOCYTES # BLD MANUAL: 1.29 10*3/MM3 (ref 0.7–3.1)
LYMPHOCYTES # BLD MANUAL: 1.34 10*3/MM3 (ref 0.7–3.1)
LYMPHOCYTES NFR BLD AUTO: 4.6 % (ref 19.6–45.3)
LYMPHOCYTES NFR BLD AUTO: 7.1 % (ref 19.6–45.3)
LYMPHOCYTES NFR BLD AUTO: 8.3 % (ref 19.6–45.3)
LYMPHOCYTES NFR BLD AUTO: 8.5 % (ref 19.6–45.3)
LYMPHOCYTES NFR BLD MANUAL: 10 % (ref 19.6–45.3)
LYMPHOCYTES NFR BLD MANUAL: 10 % (ref 5–12)
LYMPHOCYTES NFR BLD MANUAL: 11 % (ref 5–12)
LYMPHOCYTES NFR BLD MANUAL: 3 % (ref 5–12)
LYMPHOCYTES NFR BLD MANUAL: 5 % (ref 19.6–45.3)
LYMPHOCYTES NFR BLD MANUAL: 9 % (ref 19.6–45.3)
LYMPHOCYTES NFR FLD MANUAL: 13 %
Lab: ABNORMAL
M PNEUMO IGG SER IA-ACNC: NOT DETECTED
MACROCYTES BLD QL SMEAR: ABNORMAL
MACROCYTES BLD QL SMEAR: ABNORMAL
MAGNESIUM SERPL-MCNC: 1.8 MG/DL (ref 1.6–2.4)
MAGNESIUM SERPL-MCNC: 1.8 MG/DL (ref 1.6–2.4)
MAGNESIUM SERPL-MCNC: 1.9 MG/DL (ref 1.6–2.4)
MAXIMAL PREDICTED HEART RATE: 136 BPM
MCH RBC QN AUTO: 31.5 PG (ref 26.6–33)
MCH RBC QN AUTO: 33.7 PG (ref 26.6–33)
MCH RBC QN AUTO: 33.7 PG (ref 26.6–33)
MCH RBC QN AUTO: 33.9 PG (ref 26.6–33)
MCH RBC QN AUTO: 33.9 PG (ref 26.6–33)
MCH RBC QN AUTO: 34.4 PG (ref 26.6–33)
MCH RBC QN AUTO: 34.5 PG (ref 26.6–33)
MCH RBC QN AUTO: 34.5 PG (ref 26.6–33)
MCHC RBC AUTO-ENTMCNC: 32.2 G/DL (ref 31.5–35.7)
MCHC RBC AUTO-ENTMCNC: 32.4 G/DL (ref 31.5–35.7)
MCHC RBC AUTO-ENTMCNC: 32.8 G/DL (ref 31.5–35.7)
MCHC RBC AUTO-ENTMCNC: 33.2 G/DL (ref 31.5–35.7)
MCHC RBC AUTO-ENTMCNC: 34.2 G/DL (ref 31.5–35.7)
MCHC RBC AUTO-ENTMCNC: 34.3 G/DL (ref 31.5–35.7)
MCV RBC AUTO: 100.5 FL (ref 79–97)
MCV RBC AUTO: 100.6 FL (ref 79–97)
MCV RBC AUTO: 102.2 FL (ref 79–97)
MCV RBC AUTO: 103.4 FL (ref 79–97)
MCV RBC AUTO: 103.9 FL (ref 79–97)
MCV RBC AUTO: 104.1 FL (ref 79–97)
MCV RBC AUTO: 107.2 FL (ref 79–97)
MCV RBC AUTO: 97.3 FL (ref 79–97)
METHGB BLD QL: 0 % (ref 0–3)
METHGB BLD QL: 0.1 % (ref 0–3)
METHGB BLD QL: <1 % (ref 0–3)
MODALITY: ABNORMAL
MONOCYTES # BLD AUTO: 0.5 10*3/MM3 (ref 0.1–0.9)
MONOCYTES # BLD AUTO: 1.1 10*3/MM3 (ref 0.1–0.9)
MONOCYTES # BLD AUTO: 1.34 10*3/MM3 (ref 0.1–0.9)
MONOCYTES # BLD AUTO: 1.37 10*3/MM3 (ref 0.1–0.9)
MONOCYTES # BLD AUTO: 1.57 10*3/MM3 (ref 0.1–0.9)
MONOCYTES # BLD AUTO: 1.66 10*3/MM3 (ref 0.1–0.9)
MONOCYTES # BLD AUTO: 1.83 10*3/MM3 (ref 0.1–0.9)
MONOCYTES NFR BLD AUTO: 11.1 % (ref 5–12)
MONOCYTES NFR BLD AUTO: 12.3 % (ref 5–12)
MONOCYTES NFR BLD AUTO: 8.7 % (ref 5–12)
MONOCYTES NFR BLD AUTO: 9.6 % (ref 5–12)
MONOS+MACROS NFR FLD: 12 %
MYOGLOBIN SERPL-MCNC: 1597 NG/ML (ref 25–58)
NEUTROPHILS # BLD AUTO: 10.69 10*3/MM3 (ref 1.7–7)
NEUTROPHILS # BLD AUTO: 11 10*3/MM3 (ref 1.7–7)
NEUTROPHILS # BLD AUTO: 15.34 10*3/MM3 (ref 1.7–7)
NEUTROPHILS NFR BLD AUTO: 10.2 10*3/MM3 (ref 1.7–7)
NEUTROPHILS NFR BLD AUTO: 16.09 10*3/MM3 (ref 1.7–7)
NEUTROPHILS NFR BLD AUTO: 75.7 % (ref 42.7–76)
NEUTROPHILS NFR BLD AUTO: 76.5 % (ref 42.7–76)
NEUTROPHILS NFR BLD AUTO: 77.6 % (ref 42.7–76)
NEUTROPHILS NFR BLD AUTO: 84.5 % (ref 42.7–76)
NEUTROPHILS NFR BLD AUTO: 9.4 10*3/MM3 (ref 1.7–7)
NEUTROPHILS NFR BLD AUTO: 9.78 10*3/MM3 (ref 1.7–7)
NEUTROPHILS NFR BLD MANUAL: 73 % (ref 42.7–76)
NEUTROPHILS NFR BLD MANUAL: 79 % (ref 42.7–76)
NEUTROPHILS NFR BLD MANUAL: 82 % (ref 42.7–76)
NEUTROPHILS NFR FLD MANUAL: 76 %
NEUTS BAND NFR BLD MANUAL: 1 % (ref 0–5)
NEUTS BAND NFR BLD MANUAL: 10 % (ref 0–5)
NEUTS BAND NFR BLD MANUAL: 4 % (ref 0–5)
NITRITE UR QL STRIP: POSITIVE
NITRITE UR QL STRIP: POSITIVE
NOTE: ABNORMAL
NOTIFIED BY: ABNORMAL
NOTIFIED WHO: ABNORMAL
NRBC BLD AUTO-RTO: 0 /100 WBC (ref 0–0.2)
NRBC BLD AUTO-RTO: 0.3 /100 WBC (ref 0–0.2)
NRBC BLD AUTO-RTO: 0.4 /100 WBC (ref 0–0.2)
NRBC BLD AUTO-RTO: 0.6 /100 WBC (ref 0–0.2)
NRBC SPEC MANUAL: 1 /100 WBC (ref 0–0.2)
NT-PROBNP SERPL-MCNC: 286.1 PG/ML (ref 0–1800)
NT-PROBNP SERPL-MCNC: 375.2 PG/ML (ref 0–1800)
NUC CELL # FLD: 3035 /MM3
OXYHGB MFR BLDV: 89.2 % (ref 94–99)
OXYHGB MFR BLDV: 91.2 % (ref 94–99)
OXYHGB MFR BLDV: 92.1 % (ref 94–99)
OXYHGB MFR BLDV: 93 % (ref 94–99)
OXYHGB MFR BLDV: 94.7 % (ref 94–99)
OXYHGB MFR BLDV: 95.2 % (ref 94–99)
OXYHGB MFR BLDV: 95.5 % (ref 94–99)
OXYHGB MFR BLDV: 97.1 % (ref 94–99)
OXYHGB MFR BLDV: 97.1 % (ref 94–99)
OXYHGB MFR BLDV: 97.8 % (ref 94–99)
OXYHGB MFR BLDV: >98.5 % (ref 94–99)
PCO2 BLDA: 27.3 MM HG (ref 35–45)
PCO2 BLDA: 27.7 MM HG (ref 35–45)
PCO2 BLDA: 28.5 MM HG (ref 35–45)
PCO2 BLDA: 29.7 MM HG (ref 35–45)
PCO2 BLDA: 31.5 MM HG (ref 35–45)
PCO2 BLDA: 31.5 MM HG (ref 35–45)
PCO2 BLDA: 32.5 MM HG (ref 35–45)
PCO2 BLDA: 32.6 MM HG (ref 35–45)
PCO2 BLDA: 34.2 MM HG (ref 35–45)
PCO2 BLDA: 35 MM HG (ref 35–45)
PCO2 BLDA: 39.4 MM HG (ref 35–45)
PCO2 TEMP ADJ BLD: ABNORMAL MM[HG]
PEEP RESPIRATORY: 5 CM[H2O]
PEEP RESPIRATORY: 7 CM[H2O]
PH BLDA: 7.1 PH UNITS (ref 7.35–7.45)
PH BLDA: 7.17 PH UNITS (ref 7.35–7.45)
PH BLDA: 7.17 PH UNITS (ref 7.35–7.45)
PH BLDA: 7.19 PH UNITS (ref 7.35–7.45)
PH BLDA: 7.2 PH UNITS (ref 7.35–7.45)
PH BLDA: 7.22 PH UNITS (ref 7.35–7.45)
PH BLDA: 7.24 PH UNITS (ref 7.35–7.45)
PH BLDA: 7.27 PH UNITS (ref 7.35–7.45)
PH BLDA: 7.31 PH UNITS (ref 7.35–7.45)
PH BLDA: 7.39 PH UNITS (ref 7.35–7.45)
PH BLDA: 7.47 PH UNITS (ref 7.35–7.45)
PH UR STRIP.AUTO: <=5 [PH] (ref 5–8)
PH UR STRIP.AUTO: <=5 [PH] (ref 5–8)
PH, TEMP CORRECTED: ABNORMAL
PHOSPHATE SERPL-MCNC: 5.4 MG/DL (ref 2.5–4.5)
PHOSPHATE SERPL-MCNC: 6.2 MG/DL (ref 2.5–4.5)
PLAT MORPH BLD: NORMAL
PLAT MORPH BLD: NORMAL
PLATELET # BLD AUTO: 132 10*3/MM3 (ref 140–450)
PLATELET # BLD AUTO: 158 10*3/MM3 (ref 140–450)
PLATELET # BLD AUTO: 173 10*3/MM3 (ref 140–450)
PLATELET # BLD AUTO: 175 10*3/MM3 (ref 140–450)
PLATELET # BLD AUTO: 185 10*3/MM3 (ref 140–450)
PLATELET # BLD AUTO: 186 10*3/MM3 (ref 140–450)
PLATELET # BLD AUTO: 191 10*3/MM3 (ref 140–450)
PLATELET # BLD AUTO: 212 10*3/MM3 (ref 140–450)
PMV BLD AUTO: 10 FL (ref 6–12)
PMV BLD AUTO: 10 FL (ref 6–12)
PMV BLD AUTO: 10.1 FL (ref 6–12)
PMV BLD AUTO: 10.2 FL (ref 6–12)
PMV BLD AUTO: 10.3 FL (ref 6–12)
PMV BLD AUTO: 10.4 FL (ref 6–12)
PMV BLD AUTO: 10.6 FL (ref 6–12)
PMV BLD AUTO: 9.9 FL (ref 6–12)
PO2 BLDA: 114 MM HG (ref 83–108)
PO2 BLDA: 116 MM HG (ref 83–108)
PO2 BLDA: 136 MM HG (ref 83–108)
PO2 BLDA: 291 MM HG (ref 83–108)
PO2 BLDA: 59.1 MM HG (ref 83–108)
PO2 BLDA: 73.3 MM HG (ref 83–108)
PO2 BLDA: 74.2 MM HG (ref 83–108)
PO2 BLDA: 74.6 MM HG (ref 83–108)
PO2 BLDA: 79.3 MM HG (ref 83–108)
PO2 BLDA: 83.7 MM HG (ref 83–108)
PO2 BLDA: 84.9 MM HG (ref 83–108)
PO2 TEMP ADJ BLD: ABNORMAL MM[HG]
POIKILOCYTOSIS BLD QL SMEAR: ABNORMAL
POLYCHROMASIA BLD QL SMEAR: ABNORMAL
POLYCHROMASIA BLD QL SMEAR: ABNORMAL
POTASSIUM SERPL-SCNC: 4.6 MMOL/L (ref 3.5–5.2)
POTASSIUM SERPL-SCNC: 4.7 MMOL/L (ref 3.5–5.2)
POTASSIUM SERPL-SCNC: 4.8 MMOL/L (ref 3.5–5.2)
POTASSIUM SERPL-SCNC: 5 MMOL/L (ref 3.5–5.2)
POTASSIUM SERPL-SCNC: 5.1 MMOL/L (ref 3.5–5.2)
POTASSIUM SERPL-SCNC: 5.2 MMOL/L (ref 3.5–5.2)
POTASSIUM SERPL-SCNC: 5.3 MMOL/L (ref 3.5–5.2)
PROCALCITONIN SERPL-MCNC: 0.47 NG/ML (ref 0–0.25)
PROCALCITONIN SERPL-MCNC: 0.82 NG/ML (ref 0–0.25)
PROCALCITONIN SERPL-MCNC: 0.87 NG/ML (ref 0–0.25)
PROT FLD-MCNC: <1 G/DL
PROT SERPL-MCNC: 5 G/DL (ref 6–8.5)
PROT SERPL-MCNC: 5.2 G/DL (ref 6–8.5)
PROT SERPL-MCNC: 5.3 G/DL (ref 6–8.5)
PROT SERPL-MCNC: 5.6 G/DL (ref 6–8.5)
PROT SERPL-MCNC: 5.7 G/DL (ref 6–8.5)
PROT SERPL-MCNC: 6.1 G/DL (ref 6–8.5)
PROT UR QL STRIP: ABNORMAL
PROT UR QL STRIP: ABNORMAL
PROTHROMBIN TIME: 19.1 SECONDS (ref 11.9–14.1)
PROTHROMBIN TIME: 28.4 SECONDS (ref 11.9–14.1)
PROTHROMBIN TIME: 41.2 SECONDS (ref 11.9–14.1)
QT INTERVAL: 306 MS
QT INTERVAL: 324 MS
QT INTERVAL: 324 MS
QT INTERVAL: 354 MS
QTC INTERVAL: 398 MS
QTC INTERVAL: 417 MS
QTC INTERVAL: 420 MS
QTC INTERVAL: 518 MS
RBC # BLD AUTO: 3.46 10*6/MM3 (ref 3.77–5.28)
RBC # BLD AUTO: 3.57 10*6/MM3 (ref 3.77–5.28)
RBC # BLD AUTO: 3.62 10*6/MM3 (ref 3.77–5.28)
RBC # BLD AUTO: 3.62 10*6/MM3 (ref 3.77–5.28)
RBC # BLD AUTO: 3.74 10*6/MM3 (ref 3.77–5.28)
RBC # BLD AUTO: 3.83 10*6/MM3 (ref 3.77–5.28)
RBC # BLD AUTO: 4.06 10*6/MM3 (ref 3.77–5.28)
RBC # BLD AUTO: 5.23 10*6/MM3 (ref 3.77–5.28)
RBC # FLD AUTO: ABNORMAL 10*3/UL
RBC # UR: ABNORMAL /HPF
RBC # UR: ABNORMAL /HPF
REF LAB TEST METHOD: ABNORMAL
REF LAB TEST METHOD: ABNORMAL
RH BLD: POSITIVE
RH BLD: POSITIVE
RHINOVIRUS RNA SPEC NAA+PROBE: NOT DETECTED
RSV RNA NPH QL NAA+NON-PROBE: NOT DETECTED
SAO2 % BLDCOA: 90.4 % (ref 94–99)
SAO2 % BLDCOA: 91.6 % (ref 94–99)
SAO2 % BLDCOA: 93.1 % (ref 94–99)
SAO2 % BLDCOA: 93.9 % (ref 94–99)
SAO2 % BLDCOA: 95.9 % (ref 94–99)
SAO2 % BLDCOA: 96.2 % (ref 94–99)
SAO2 % BLDCOA: 96.3 % (ref 94–99)
SAO2 % BLDCOA: 98 % (ref 94–99)
SAO2 % BLDCOA: 98.2 % (ref 94–99)
SAO2 % BLDCOA: 98.6 % (ref 94–99)
SAO2 % BLDCOA: >99.2 % (ref 94–99)
SARS-COV-2 RNA RESP QL NAA+PROBE: DETECTED
SCAN SLIDE: NORMAL
SET MECH RESP RATE: 15
SET MECH RESP RATE: 15
SET MECH RESP RATE: 22
SET MECH RESP RATE: 30
SET MECH RESP RATE: 30
SODIUM SERPL-SCNC: 135 MMOL/L (ref 136–145)
SODIUM SERPL-SCNC: 136 MMOL/L (ref 136–145)
SODIUM SERPL-SCNC: 137 MMOL/L (ref 136–145)
SODIUM SERPL-SCNC: 138 MMOL/L (ref 136–145)
SODIUM SERPL-SCNC: 141 MMOL/L (ref 136–145)
SODIUM SERPL-SCNC: 146 MMOL/L (ref 136–145)
SODIUM SERPL-SCNC: 146 MMOL/L (ref 136–145)
SODIUM SERPL-SCNC: 148 MMOL/L (ref 136–145)
SODIUM SERPL-SCNC: 148 MMOL/L (ref 136–145)
SODIUM SERPL-SCNC: 149 MMOL/L (ref 136–145)
SODIUM SERPL-SCNC: 150 MMOL/L (ref 136–145)
SP GR UR STRIP: 1.02 (ref 1–1.03)
SP GR UR STRIP: 1.02 (ref 1–1.03)
SQUAMOUS #/AREA URNS HPF: ABNORMAL /HPF
SQUAMOUS #/AREA URNS HPF: ABNORMAL /HPF
STRESS TARGET HR: 116 BPM
T&S EXPIRATION DATE: NORMAL
TARGETS BLD QL SMEAR: ABNORMAL
TIBC SERPL-MCNC: 131 MCG/DL (ref 298–536)
TRANSFERRIN SERPL-MCNC: 88 MG/DL (ref 200–360)
TROPONIN T SERPL-MCNC: 0.05 NG/ML (ref 0–0.03)
TROPONIN T SERPL-MCNC: <0.01 NG/ML (ref 0–0.03)
UNIT  ABO: NORMAL
UNIT  RH: NORMAL
UROBILINOGEN UR QL STRIP: ABNORMAL
UROBILINOGEN UR QL STRIP: ABNORMAL
VENTILATOR MODE: ABNORMAL
VIT B12 BLD-MCNC: 904 PG/ML (ref 211–946)
VT ON VENT VENT: 400 ML
VT ON VENT VENT: 500 ML
WBC # BLD AUTO: 12.29 10*3/MM3 (ref 3.4–10.8)
WBC # BLD AUTO: 12.61 10*3/MM3 (ref 3.4–10.8)
WBC # BLD AUTO: 13.31 10*3/MM3 (ref 3.4–10.8)
WBC # BLD AUTO: 13.36 10*3/MM3 (ref 3.4–10.8)
WBC # BLD AUTO: 13.48 10*3/MM3 (ref 3.4–10.8)
WBC # BLD AUTO: 14.28 10*3/MM3 (ref 3.4–10.8)
WBC # BLD AUTO: 16.67 10*3/MM3 (ref 3.4–10.8)
WBC # BLD AUTO: 19.05 10*3/MM3 (ref 3.4–10.8)
WBC UR QL AUTO: ABNORMAL /HPF
WBC UR QL AUTO: ABNORMAL /HPF
WHOLE BLOOD HOLD SPECIMEN: NORMAL
WHOLE BLOOD HOLD SPECIMEN: NORMAL

## 2021-01-01 PROCEDURE — 86140 C-REACTIVE PROTEIN: CPT | Performed by: PHYSICIAN ASSISTANT

## 2021-01-01 PROCEDURE — 84145 PROCALCITONIN (PCT): CPT | Performed by: INTERNAL MEDICINE

## 2021-01-01 PROCEDURE — 87205 SMEAR GRAM STAIN: CPT | Performed by: INTERNAL MEDICINE

## 2021-01-01 PROCEDURE — 82375 ASSAY CARBOXYHB QUANT: CPT

## 2021-01-01 PROCEDURE — 82962 GLUCOSE BLOOD TEST: CPT

## 2021-01-01 PROCEDURE — 25010000002 MORPHINE PER 10 MG

## 2021-01-01 PROCEDURE — 85018 HEMOGLOBIN: CPT | Performed by: INTERNAL MEDICINE

## 2021-01-01 PROCEDURE — 87070 CULTURE OTHR SPECIMN AEROBIC: CPT | Performed by: INTERNAL MEDICINE

## 2021-01-01 PROCEDURE — 83615 LACTATE (LD) (LDH) ENZYME: CPT | Performed by: NURSE PRACTITIONER

## 2021-01-01 PROCEDURE — 83605 ASSAY OF LACTIC ACID: CPT | Performed by: PHYSICIAN ASSISTANT

## 2021-01-01 PROCEDURE — 25010000002 CALCIUM GLUCONATE-NACL 1-0.675 GM/50ML-% SOLUTION: Performed by: INTERNAL MEDICINE

## 2021-01-01 PROCEDURE — 80053 COMPREHEN METABOLIC PANEL: CPT | Performed by: PHYSICIAN ASSISTANT

## 2021-01-01 PROCEDURE — 82805 BLOOD GASES W/O2 SATURATION: CPT

## 2021-01-01 PROCEDURE — 85610 PROTHROMBIN TIME: CPT | Performed by: INTERNAL MEDICINE

## 2021-01-01 PROCEDURE — 02HV33Z INSERTION OF INFUSION DEVICE INTO SUPERIOR VENA CAVA, PERCUTANEOUS APPROACH: ICD-10-PCS | Performed by: FAMILY MEDICINE

## 2021-01-01 PROCEDURE — 94799 UNLISTED PULMONARY SVC/PX: CPT

## 2021-01-01 PROCEDURE — 25010000002 PIPERACILLIN SOD-TAZOBACTAM PER 1 G: Performed by: INTERNAL MEDICINE

## 2021-01-01 PROCEDURE — P9612 CATHETERIZE FOR URINE SPEC: HCPCS

## 2021-01-01 PROCEDURE — 85730 THROMBOPLASTIN TIME PARTIAL: CPT | Performed by: INTERNAL MEDICINE

## 2021-01-01 PROCEDURE — 71045 X-RAY EXAM CHEST 1 VIEW: CPT | Performed by: RADIOLOGY

## 2021-01-01 PROCEDURE — 25010000002 MORPHINE PER 10 MG: Performed by: INTERNAL MEDICINE

## 2021-01-01 PROCEDURE — 85025 COMPLETE CBC W/AUTO DIFF WBC: CPT | Performed by: PHYSICIAN ASSISTANT

## 2021-01-01 PROCEDURE — 74178 CT ABD&PLV WO CNTR FLWD CNTR: CPT | Performed by: RADIOLOGY

## 2021-01-01 PROCEDURE — 36600 WITHDRAWAL OF ARTERIAL BLOOD: CPT

## 2021-01-01 PROCEDURE — 99291 CRITICAL CARE FIRST HOUR: CPT | Performed by: INTERNAL MEDICINE

## 2021-01-01 PROCEDURE — 71250 CT THORAX DX C-: CPT

## 2021-01-01 PROCEDURE — 78580 LUNG PERFUSION IMAGING: CPT

## 2021-01-01 PROCEDURE — 03HY32Z INSERTION OF MONITORING DEVICE INTO UPPER ARTERY, PERCUTANEOUS APPROACH: ICD-10-PCS | Performed by: INTERNAL MEDICINE

## 2021-01-01 PROCEDURE — 31500 INSERT EMERGENCY AIRWAY: CPT | Performed by: ANESTHESIOLOGY

## 2021-01-01 PROCEDURE — 85379 FIBRIN DEGRADATION QUANT: CPT | Performed by: NURSE PRACTITIONER

## 2021-01-01 PROCEDURE — 86900 BLOOD TYPING SEROLOGIC ABO: CPT

## 2021-01-01 PROCEDURE — A9540 TC99M MAA: HCPCS | Performed by: FAMILY MEDICINE

## 2021-01-01 PROCEDURE — 83735 ASSAY OF MAGNESIUM: CPT | Performed by: INTERNAL MEDICINE

## 2021-01-01 PROCEDURE — 74176 CT ABD & PELVIS W/O CONTRAST: CPT

## 2021-01-01 PROCEDURE — 25010000002 PHENYLEPHRINE 10 MG/ML SOLUTION: Performed by: INTERNAL MEDICINE

## 2021-01-01 PROCEDURE — 83690 ASSAY OF LIPASE: CPT | Performed by: PHYSICIAN ASSISTANT

## 2021-01-01 PROCEDURE — 93010 ELECTROCARDIOGRAM REPORT: CPT | Performed by: INTERNAL MEDICINE

## 2021-01-01 PROCEDURE — 71045 X-RAY EXAM CHEST 1 VIEW: CPT

## 2021-01-01 PROCEDURE — 83874 ASSAY OF MYOGLOBIN: CPT | Performed by: INTERNAL MEDICINE

## 2021-01-01 PROCEDURE — 86923 COMPATIBILITY TEST ELECTRIC: CPT

## 2021-01-01 PROCEDURE — 49083 ABD PARACENTESIS W/IMAGING: CPT | Performed by: INTERNAL MEDICINE

## 2021-01-01 PROCEDURE — P9041 ALBUMIN (HUMAN),5%, 50ML: HCPCS | Performed by: INTERNAL MEDICINE

## 2021-01-01 PROCEDURE — 85384 FIBRINOGEN ACTIVITY: CPT | Performed by: INTERNAL MEDICINE

## 2021-01-01 PROCEDURE — 84484 ASSAY OF TROPONIN QUANT: CPT | Performed by: PHYSICIAN ASSISTANT

## 2021-01-01 PROCEDURE — 25010000002 HEPARIN (PORCINE) PER 1000 UNITS: Performed by: INTERNAL MEDICINE

## 2021-01-01 PROCEDURE — 82042 OTHER SOURCE ALBUMIN QUAN EA: CPT | Performed by: INTERNAL MEDICINE

## 2021-01-01 PROCEDURE — 76937 US GUIDE VASCULAR ACCESS: CPT | Performed by: INTERNAL MEDICINE

## 2021-01-01 PROCEDURE — 83605 ASSAY OF LACTIC ACID: CPT | Performed by: NURSE PRACTITIONER

## 2021-01-01 PROCEDURE — 80053 COMPREHEN METABOLIC PANEL: CPT | Performed by: INTERNAL MEDICINE

## 2021-01-01 PROCEDURE — 83615 LACTATE (LD) (LDH) ENZYME: CPT | Performed by: PHYSICIAN ASSISTANT

## 2021-01-01 PROCEDURE — 84100 ASSAY OF PHOSPHORUS: CPT | Performed by: INTERNAL MEDICINE

## 2021-01-01 PROCEDURE — 84145 PROCALCITONIN (PCT): CPT | Performed by: NURSE PRACTITIONER

## 2021-01-01 PROCEDURE — 83050 HGB METHEMOGLOBIN QUAN: CPT

## 2021-01-01 PROCEDURE — 36620 INSERTION CATHETER ARTERY: CPT | Performed by: INTERNAL MEDICINE

## 2021-01-01 PROCEDURE — 85362 FIBRIN DEGRADATION PRODUCTS: CPT | Performed by: INTERNAL MEDICINE

## 2021-01-01 PROCEDURE — 85379 FIBRIN DEGRADATION QUANT: CPT | Performed by: PHYSICIAN ASSISTANT

## 2021-01-01 PROCEDURE — 86900 BLOOD TYPING SEROLOGIC ABO: CPT | Performed by: PHYSICIAN ASSISTANT

## 2021-01-01 PROCEDURE — P9016 RBC LEUKOCYTES REDUCED: HCPCS

## 2021-01-01 PROCEDURE — 89051 BODY FLUID CELL COUNT: CPT | Performed by: INTERNAL MEDICINE

## 2021-01-01 PROCEDURE — 82945 GLUCOSE OTHER FLUID: CPT | Performed by: INTERNAL MEDICINE

## 2021-01-01 PROCEDURE — 85730 THROMBOPLASTIN TIME PARTIAL: CPT | Performed by: PHYSICIAN ASSISTANT

## 2021-01-01 PROCEDURE — 25010000002 OCTREOTIDE PER 25 MCG: Performed by: INTERNAL MEDICINE

## 2021-01-01 PROCEDURE — 83735 ASSAY OF MAGNESIUM: CPT | Performed by: PHYSICIAN ASSISTANT

## 2021-01-01 PROCEDURE — 99292 CRITICAL CARE ADDL 30 MIN: CPT | Performed by: INTERNAL MEDICINE

## 2021-01-01 PROCEDURE — 99233 SBSQ HOSP IP/OBS HIGH 50: CPT | Performed by: INTERNAL MEDICINE

## 2021-01-01 PROCEDURE — 84484 ASSAY OF TROPONIN QUANT: CPT | Performed by: INTERNAL MEDICINE

## 2021-01-01 PROCEDURE — 85014 HEMATOCRIT: CPT | Performed by: INTERNAL MEDICINE

## 2021-01-01 PROCEDURE — 82150 ASSAY OF AMYLASE: CPT | Performed by: INTERNAL MEDICINE

## 2021-01-01 PROCEDURE — 82330 ASSAY OF CALCIUM: CPT | Performed by: INTERNAL MEDICINE

## 2021-01-01 PROCEDURE — 85007 BL SMEAR W/DIFF WBC COUNT: CPT | Performed by: PHYSICIAN ASSISTANT

## 2021-01-01 PROCEDURE — 85025 COMPLETE CBC W/AUTO DIFF WBC: CPT | Performed by: INTERNAL MEDICINE

## 2021-01-01 PROCEDURE — 83880 ASSAY OF NATRIURETIC PEPTIDE: CPT | Performed by: INTERNAL MEDICINE

## 2021-01-01 PROCEDURE — 80048 BASIC METABOLIC PNL TOTAL CA: CPT | Performed by: INTERNAL MEDICINE

## 2021-01-01 PROCEDURE — 25010000002 VANCOMYCIN: Performed by: NURSE PRACTITIONER

## 2021-01-01 PROCEDURE — 84484 ASSAY OF TROPONIN QUANT: CPT | Performed by: NURSE PRACTITIONER

## 2021-01-01 PROCEDURE — 82728 ASSAY OF FERRITIN: CPT | Performed by: NURSE PRACTITIONER

## 2021-01-01 PROCEDURE — 25010000002 PIPERACILLIN SOD-TAZOBACTAM PER 1 G: Performed by: NURSE PRACTITIONER

## 2021-01-01 PROCEDURE — 74178 CT ABD&PLV WO CNTR FLWD CNTR: CPT

## 2021-01-01 PROCEDURE — 85610 PROTHROMBIN TIME: CPT | Performed by: PHYSICIAN ASSISTANT

## 2021-01-01 PROCEDURE — 0W9G3ZZ DRAINAGE OF PERITONEAL CAVITY, PERCUTANEOUS APPROACH: ICD-10-PCS | Performed by: INTERNAL MEDICINE

## 2021-01-01 PROCEDURE — 70490 CT SOFT TISSUE NECK W/O DYE: CPT

## 2021-01-01 PROCEDURE — 25010000002 ALBUMIN HUMAN 5% PER 50 ML: Performed by: INTERNAL MEDICINE

## 2021-01-01 PROCEDURE — 0100U HC BIOFIRE FILMARRAY RESP PANEL 2: CPT | Performed by: PHYSICIAN ASSISTANT

## 2021-01-01 PROCEDURE — 87075 CULTR BACTERIA EXCEPT BLOOD: CPT | Performed by: INTERNAL MEDICINE

## 2021-01-01 PROCEDURE — 86901 BLOOD TYPING SEROLOGIC RH(D): CPT | Performed by: PHYSICIAN ASSISTANT

## 2021-01-01 PROCEDURE — P9047 ALBUMIN (HUMAN), 25%, 50ML: HCPCS | Performed by: INTERNAL MEDICINE

## 2021-01-01 PROCEDURE — 82105 ALPHA-FETOPROTEIN SERUM: CPT | Performed by: INTERNAL MEDICINE

## 2021-01-01 PROCEDURE — 82140 ASSAY OF AMMONIA: CPT | Performed by: INTERNAL MEDICINE

## 2021-01-01 PROCEDURE — 86301 IMMUNOASSAY TUMOR CA 19-9: CPT | Performed by: INTERNAL MEDICINE

## 2021-01-01 PROCEDURE — 25010000002 ALBUMIN HUMAN 25% PER 50 ML: Performed by: INTERNAL MEDICINE

## 2021-01-01 PROCEDURE — 86927 PLASMA FRESH FROZEN: CPT

## 2021-01-01 PROCEDURE — P9017 PLASMA 1 DONOR FRZ W/IN 8 HR: HCPCS

## 2021-01-01 PROCEDURE — 87040 BLOOD CULTURE FOR BACTERIA: CPT | Performed by: PHYSICIAN ASSISTANT

## 2021-01-01 PROCEDURE — 25010000002 CEFTRIAXONE PER 250 MG: Performed by: INTERNAL MEDICINE

## 2021-01-01 PROCEDURE — 82746 ASSAY OF FOLIC ACID SERUM: CPT | Performed by: PHYSICIAN ASSISTANT

## 2021-01-01 PROCEDURE — 87040 BLOOD CULTURE FOR BACTERIA: CPT | Performed by: NURSE PRACTITIONER

## 2021-01-01 PROCEDURE — 83605 ASSAY OF LACTIC ACID: CPT | Performed by: INTERNAL MEDICINE

## 2021-01-01 PROCEDURE — 94002 VENT MGMT INPAT INIT DAY: CPT

## 2021-01-01 PROCEDURE — 82607 VITAMIN B-12: CPT | Performed by: PHYSICIAN ASSISTANT

## 2021-01-01 PROCEDURE — 25010000002 PIPERACILLIN SOD-TAZOBACTAM PER 1 G: Performed by: PHYSICIAN ASSISTANT

## 2021-01-01 PROCEDURE — 99223 1ST HOSP IP/OBS HIGH 75: CPT | Performed by: PHYSICIAN ASSISTANT

## 2021-01-01 PROCEDURE — 78580 LUNG PERFUSION IMAGING: CPT | Performed by: RADIOLOGY

## 2021-01-01 PROCEDURE — 82378 CARCINOEMBRYONIC ANTIGEN: CPT | Performed by: INTERNAL MEDICINE

## 2021-01-01 PROCEDURE — 99214 OFFICE O/P EST MOD 30 MIN: CPT | Performed by: INTERNAL MEDICINE

## 2021-01-01 PROCEDURE — 84157 ASSAY OF PROTEIN OTHER: CPT | Performed by: INTERNAL MEDICINE

## 2021-01-01 PROCEDURE — 99285 EMERGENCY DEPT VISIT HI MDM: CPT

## 2021-01-01 PROCEDURE — 87102 FUNGUS ISOLATION CULTURE: CPT | Performed by: INTERNAL MEDICINE

## 2021-01-01 PROCEDURE — 82728 ASSAY OF FERRITIN: CPT | Performed by: PHYSICIAN ASSISTANT

## 2021-01-01 PROCEDURE — 36430 TRANSFUSION BLD/BLD COMPNT: CPT

## 2021-01-01 PROCEDURE — 25010000002 IOPAMIDOL 61 % SOLUTION: Performed by: INTERNAL MEDICINE

## 2021-01-01 PROCEDURE — 99223 1ST HOSP IP/OBS HIGH 75: CPT | Performed by: INTERNAL MEDICINE

## 2021-01-01 PROCEDURE — 86901 BLOOD TYPING SEROLOGIC RH(D): CPT

## 2021-01-01 PROCEDURE — 81001 URINALYSIS AUTO W/SCOPE: CPT | Performed by: PHYSICIAN ASSISTANT

## 2021-01-01 PROCEDURE — 87015 SPECIMEN INFECT AGNT CONCNTJ: CPT | Performed by: INTERNAL MEDICINE

## 2021-01-01 PROCEDURE — 99222 1ST HOSP IP/OBS MODERATE 55: CPT | Performed by: INTERNAL MEDICINE

## 2021-01-01 PROCEDURE — 36415 COLL VENOUS BLD VENIPUNCTURE: CPT

## 2021-01-01 PROCEDURE — 82247 BILIRUBIN TOTAL: CPT | Performed by: INTERNAL MEDICINE

## 2021-01-01 PROCEDURE — 82565 ASSAY OF CREATININE: CPT

## 2021-01-01 PROCEDURE — 25010000002 EPINEPHRINE 30 MG/30ML SOLUTION 30 ML VIAL: Performed by: INTERNAL MEDICINE

## 2021-01-01 PROCEDURE — 85025 COMPLETE CBC W/AUTO DIFF WBC: CPT | Performed by: NURSE PRACTITIONER

## 2021-01-01 PROCEDURE — 74176 CT ABD & PELVIS W/O CONTRAST: CPT | Performed by: RADIOLOGY

## 2021-01-01 PROCEDURE — 83540 ASSAY OF IRON: CPT | Performed by: PHYSICIAN ASSISTANT

## 2021-01-01 PROCEDURE — 93005 ELECTROCARDIOGRAM TRACING: CPT | Performed by: INTERNAL MEDICINE

## 2021-01-01 PROCEDURE — 86920 COMPATIBILITY TEST SPIN: CPT

## 2021-01-01 PROCEDURE — 71250 CT THORAX DX C-: CPT | Performed by: RADIOLOGY

## 2021-01-01 PROCEDURE — 84466 ASSAY OF TRANSFERRIN: CPT | Performed by: PHYSICIAN ASSISTANT

## 2021-01-01 PROCEDURE — XW033E5 INTRODUCTION OF REMDESIVIR ANTI-INFECTIVE INTO PERIPHERAL VEIN, PERCUTANEOUS APPROACH, NEW TECHNOLOGY GROUP 5: ICD-10-PCS | Performed by: INTERNAL MEDICINE

## 2021-01-01 PROCEDURE — 93005 ELECTROCARDIOGRAM TRACING: CPT | Performed by: PHYSICIAN ASSISTANT

## 2021-01-01 PROCEDURE — 81001 URINALYSIS AUTO W/SCOPE: CPT | Performed by: NURSE PRACTITIONER

## 2021-01-01 PROCEDURE — 85027 COMPLETE CBC AUTOMATED: CPT | Performed by: INTERNAL MEDICINE

## 2021-01-01 PROCEDURE — 76705 ECHO EXAM OF ABDOMEN: CPT | Performed by: RADIOLOGY

## 2021-01-01 PROCEDURE — 83615 LACTATE (LD) (LDH) ENZYME: CPT | Performed by: INTERNAL MEDICINE

## 2021-01-01 PROCEDURE — 87086 URINE CULTURE/COLONY COUNT: CPT | Performed by: NURSE PRACTITIONER

## 2021-01-01 PROCEDURE — P9037 PLATE PHERES LEUKOREDU IRRAD: HCPCS

## 2021-01-01 PROCEDURE — 80053 COMPREHEN METABOLIC PANEL: CPT | Performed by: NURSE PRACTITIONER

## 2021-01-01 PROCEDURE — 0 TECHNETIUM ALBUMIN AGGREGATED: Performed by: FAMILY MEDICINE

## 2021-01-01 PROCEDURE — 93970 EXTREMITY STUDY: CPT

## 2021-01-01 PROCEDURE — 87636 SARSCOV2 & INF A&B AMP PRB: CPT | Performed by: EMERGENCY MEDICINE

## 2021-01-01 PROCEDURE — 25010000003 PHYTONADIONE 10 MG/ML SOLUTION: Performed by: INTERNAL MEDICINE

## 2021-01-01 PROCEDURE — 93306 TTE W/DOPPLER COMPLETE: CPT

## 2021-01-01 PROCEDURE — 93306 TTE W/DOPPLER COMPLETE: CPT | Performed by: INTERNAL MEDICINE

## 2021-01-01 PROCEDURE — 86140 C-REACTIVE PROTEIN: CPT | Performed by: NURSE PRACTITIONER

## 2021-01-01 PROCEDURE — 83880 ASSAY OF NATRIURETIC PEPTIDE: CPT | Performed by: NURSE PRACTITIONER

## 2021-01-01 PROCEDURE — 93005 ELECTROCARDIOGRAM TRACING: CPT | Performed by: NURSE PRACTITIONER

## 2021-01-01 PROCEDURE — 94003 VENT MGMT INPAT SUBQ DAY: CPT

## 2021-01-01 PROCEDURE — 86850 RBC ANTIBODY SCREEN: CPT | Performed by: PHYSICIAN ASSISTANT

## 2021-01-01 PROCEDURE — 76705 ECHO EXAM OF ABDOMEN: CPT

## 2021-01-01 RX ORDER — LEVOFLOXACIN 250 MG/1
250 TABLET ORAL DAILY
COMMUNITY

## 2021-01-01 RX ORDER — LACTULOSE 10 G/15ML
20 SOLUTION ORAL EVERY 4 HOURS
Status: DISCONTINUED | OUTPATIENT
Start: 2021-01-01 | End: 2021-01-01

## 2021-01-01 RX ORDER — DEXTROSE MONOHYDRATE 25 G/50ML
INJECTION, SOLUTION INTRAVENOUS
Status: COMPLETED
Start: 2021-01-01 | End: 2021-01-01

## 2021-01-01 RX ORDER — ASPIRIN 81 MG/1
324 TABLET, CHEWABLE ORAL ONCE
Status: COMPLETED | OUTPATIENT
Start: 2021-01-01 | End: 2021-01-01

## 2021-01-01 RX ORDER — LACTULOSE 10 G/15ML
20 SOLUTION ORAL 2 TIMES DAILY
Status: DISCONTINUED | OUTPATIENT
Start: 2021-01-01 | End: 2021-03-15 | Stop reason: HOSPADM

## 2021-01-01 RX ORDER — ASPIRIN 81 MG/1
81 TABLET ORAL DAILY
Status: CANCELLED | OUTPATIENT
Start: 2021-01-01

## 2021-01-01 RX ORDER — OCTREOTIDE ACETATE 500 UG/ML
200 INJECTION, SOLUTION INTRAVENOUS; SUBCUTANEOUS EVERY 4 HOURS
Status: DISCONTINUED | OUTPATIENT
Start: 2021-01-01 | End: 2021-01-01

## 2021-01-01 RX ORDER — SODIUM CHLORIDE 0.9 % (FLUSH) 0.9 %
3-10 SYRINGE (ML) INJECTION AS NEEDED
Status: DISCONTINUED | OUTPATIENT
Start: 2021-01-01 | End: 2021-01-01 | Stop reason: HOSPADM

## 2021-01-01 RX ORDER — SODIUM CHLORIDE 9 MG/ML
100 INJECTION, SOLUTION INTRAVENOUS CONTINUOUS
Status: DISCONTINUED | OUTPATIENT
Start: 2021-01-01 | End: 2021-01-01

## 2021-01-01 RX ORDER — PANTOPRAZOLE SODIUM 40 MG/10ML
40 INJECTION, POWDER, LYOPHILIZED, FOR SOLUTION INTRAVENOUS EVERY 12 HOURS SCHEDULED
Status: DISCONTINUED | OUTPATIENT
Start: 2021-01-01 | End: 2021-03-15 | Stop reason: HOSPADM

## 2021-01-01 RX ORDER — ENALAPRIL MALEATE 10 MG/1
20 TABLET ORAL DAILY
Status: DISCONTINUED | OUTPATIENT
Start: 2021-01-01 | End: 2021-01-01 | Stop reason: HOSPADM

## 2021-01-01 RX ORDER — FUROSEMIDE 20 MG/1
20 TABLET ORAL DAILY
Status: CANCELLED | OUTPATIENT
Start: 2021-01-01

## 2021-01-01 RX ORDER — OCTREOTIDE ACETATE 500 UG/ML
200 INJECTION, SOLUTION INTRAVENOUS; SUBCUTANEOUS ONCE
Status: COMPLETED | OUTPATIENT
Start: 2021-01-01 | End: 2021-01-01

## 2021-01-01 RX ORDER — ALBUMIN, HUMAN INJ 5% 5 %
250 SOLUTION INTRAVENOUS
Status: COMPLETED | OUTPATIENT
Start: 2021-01-01 | End: 2021-01-01

## 2021-01-01 RX ORDER — NITROGLYCERIN 0.4 MG/1
0.4 TABLET SUBLINGUAL
Status: DISCONTINUED | OUTPATIENT
Start: 2021-01-01 | End: 2021-01-01 | Stop reason: HOSPADM

## 2021-01-01 RX ORDER — MORPHINE SULFATE 2 MG/ML
1 INJECTION, SOLUTION INTRAMUSCULAR; INTRAVENOUS
Status: DISCONTINUED | OUTPATIENT
Start: 2021-01-01 | End: 2021-03-15 | Stop reason: HOSPADM

## 2021-01-01 RX ORDER — ALBUMIN (HUMAN) 12.5 G/50ML
50 SOLUTION INTRAVENOUS 2 TIMES DAILY
Status: DISCONTINUED | OUTPATIENT
Start: 2021-01-01 | End: 2021-01-01

## 2021-01-01 RX ORDER — PHYTONADIONE 10 MG/ML
10 INJECTION, EMULSION INTRAMUSCULAR; INTRAVENOUS; SUBCUTANEOUS ONCE
Status: COMPLETED | OUTPATIENT
Start: 2021-01-01 | End: 2021-01-01

## 2021-01-01 RX ORDER — SODIUM CHLORIDE 0.9 % (FLUSH) 0.9 %
10 SYRINGE (ML) INJECTION AS NEEDED
Status: DISCONTINUED | OUTPATIENT
Start: 2021-01-01 | End: 2021-03-15 | Stop reason: HOSPADM

## 2021-01-01 RX ORDER — ALBUMIN (HUMAN) 12.5 G/50ML
12.5 SOLUTION INTRAVENOUS 3 TIMES DAILY
Status: CANCELLED | OUTPATIENT
Start: 2021-01-01

## 2021-01-01 RX ORDER — HEPARIN SODIUM 5000 [USP'U]/ML
5000 INJECTION, SOLUTION INTRAVENOUS; SUBCUTANEOUS EVERY 8 HOURS SCHEDULED
Status: DISCONTINUED | OUTPATIENT
Start: 2021-01-01 | End: 2021-01-01

## 2021-01-01 RX ORDER — NOREPINEPHRINE BIT/0.9 % NACL 8 MG/250ML
.02-.3 INFUSION BOTTLE (ML) INTRAVENOUS
Status: DISCONTINUED | OUTPATIENT
Start: 2021-01-01 | End: 2021-03-15 | Stop reason: HOSPADM

## 2021-01-01 RX ORDER — ENALAPRIL MALEATE 10 MG/1
20 TABLET ORAL DAILY
Status: CANCELLED | OUTPATIENT
Start: 2021-01-01

## 2021-01-01 RX ORDER — SPIRONOLACTONE 25 MG/1
50 TABLET ORAL DAILY
Status: CANCELLED | OUTPATIENT
Start: 2021-01-01

## 2021-01-01 RX ORDER — ALBUTEROL SULFATE 90 UG/1
2 AEROSOL, METERED RESPIRATORY (INHALATION) EVERY 4 HOURS PRN
Status: CANCELLED | OUTPATIENT
Start: 2021-01-01

## 2021-01-01 RX ORDER — HEPARIN SODIUM 5000 [USP'U]/ML
5000 INJECTION, SOLUTION INTRAVENOUS; SUBCUTANEOUS ONCE
Status: COMPLETED | OUTPATIENT
Start: 2021-01-01 | End: 2021-01-01

## 2021-01-01 RX ORDER — DEXTROSE MONOHYDRATE 100 MG/ML
20 INJECTION, SOLUTION INTRAVENOUS CONTINUOUS PRN
Status: DISCONTINUED | OUTPATIENT
Start: 2021-01-01 | End: 2021-03-15 | Stop reason: HOSPADM

## 2021-01-01 RX ORDER — ACETAMINOPHEN 325 MG/1
650 TABLET ORAL EVERY 4 HOURS PRN
Status: DISCONTINUED | OUTPATIENT
Start: 2021-01-01 | End: 2021-01-01

## 2021-01-01 RX ORDER — DIPHENHYDRAMINE, LIDOCAINE, NYSTATIN
5 KIT ORAL 4 TIMES DAILY
COMMUNITY

## 2021-01-01 RX ORDER — SODIUM CHLORIDE 0.9 % (FLUSH) 0.9 %
3 SYRINGE (ML) INJECTION EVERY 12 HOURS SCHEDULED
Status: DISCONTINUED | OUTPATIENT
Start: 2021-01-01 | End: 2021-01-01 | Stop reason: HOSPADM

## 2021-01-01 RX ORDER — DIPHENHYDRAMINE, LIDOCAINE, NYSTATIN
5 KIT ORAL 4 TIMES DAILY
Status: CANCELLED | OUTPATIENT
Start: 2021-01-01

## 2021-01-01 RX ORDER — ALBUMIN (HUMAN) 12.5 G/50ML
25 SOLUTION INTRAVENOUS 2 TIMES DAILY
Status: DISCONTINUED | OUTPATIENT
Start: 2021-01-01 | End: 2021-01-01

## 2021-01-01 RX ORDER — LIDOCAINE HYDROCHLORIDE 10 MG/ML
INJECTION, SOLUTION INFILTRATION; PERINEURAL
Status: DISPENSED
Start: 2021-01-01 | End: 2021-01-01

## 2021-01-01 RX ORDER — DEXTROSE MONOHYDRATE 100 MG/ML
150 INJECTION, SOLUTION INTRAVENOUS CONTINUOUS
Status: DISCONTINUED | OUTPATIENT
Start: 2021-01-01 | End: 2021-03-15 | Stop reason: HOSPADM

## 2021-01-01 RX ORDER — PHENYLEPHRINE HCL IN 0.9% NACL 0.5 MG/5ML
.5-3 SYRINGE (ML) INTRAVENOUS
Status: DISCONTINUED | OUTPATIENT
Start: 2021-01-01 | End: 2021-03-15 | Stop reason: HOSPADM

## 2021-01-01 RX ORDER — ASPIRIN 81 MG/1
81 TABLET ORAL DAILY
COMMUNITY

## 2021-01-01 RX ORDER — SODIUM CHLORIDE 9 MG/ML
200 INJECTION, SOLUTION INTRAVENOUS CONTINUOUS
Status: DISCONTINUED | OUTPATIENT
Start: 2021-01-01 | End: 2021-01-01

## 2021-01-01 RX ORDER — ALBUMIN (HUMAN) 12.5 G/50ML
25 SOLUTION INTRAVENOUS DAILY
Status: DISCONTINUED | OUTPATIENT
Start: 2021-01-01 | End: 2021-03-15 | Stop reason: HOSPADM

## 2021-01-01 RX ORDER — OCTREOTIDE ACETATE 100 UG/ML
50 INJECTION, SOLUTION INTRAVENOUS; SUBCUTANEOUS ONCE
Status: COMPLETED | OUTPATIENT
Start: 2021-01-01 | End: 2021-01-01

## 2021-01-01 RX ORDER — CHLORHEXIDINE GLUCONATE 0.12 MG/ML
15 RINSE ORAL EVERY 12 HOURS SCHEDULED
Status: DISCONTINUED | OUTPATIENT
Start: 2021-01-01 | End: 2021-03-15 | Stop reason: HOSPADM

## 2021-01-01 RX ORDER — METHYLPREDNISOLONE 4 MG/1
1 TABLET ORAL TAKE AS DIRECTED
COMMUNITY

## 2021-01-01 RX ORDER — HEPARIN SODIUM 5000 [USP'U]/ML
5000 INJECTION, SOLUTION INTRAVENOUS; SUBCUTANEOUS AS NEEDED
Status: DISCONTINUED | OUTPATIENT
Start: 2021-01-01 | End: 2021-01-01

## 2021-01-01 RX ORDER — DEXTROSE MONOHYDRATE 25 G/50ML
100 INJECTION, SOLUTION INTRAVENOUS
Status: DISCONTINUED | OUTPATIENT
Start: 2021-01-01 | End: 2021-03-15 | Stop reason: HOSPADM

## 2021-01-01 RX ORDER — MIDAZOLAM IN NACL,ISO-OSMOT/PF 50 MG/50ML
1-10 INFUSION BOTTLE (ML) INTRAVENOUS
Status: DISCONTINUED | OUTPATIENT
Start: 2021-01-01 | End: 2021-03-15 | Stop reason: HOSPADM

## 2021-01-01 RX ORDER — ONDANSETRON 2 MG/ML
4 INJECTION INTRAMUSCULAR; INTRAVENOUS EVERY 6 HOURS PRN
Status: DISCONTINUED | OUTPATIENT
Start: 2021-01-01 | End: 2021-01-01

## 2021-01-01 RX ORDER — ALBUTEROL SULFATE 90 UG/1
2 AEROSOL, METERED RESPIRATORY (INHALATION) EVERY 4 HOURS PRN
COMMUNITY

## 2021-01-01 RX ORDER — SPIRONOLACTONE 50 MG/1
50 TABLET, FILM COATED ORAL DAILY
Qty: 30 TABLET | Refills: 6 | Status: SHIPPED | OUTPATIENT
Start: 2021-01-01 | End: 2021-01-01

## 2021-01-01 RX ORDER — DEXTROMETHORPHAN HYDROBROMIDE AND PROMETHAZINE HYDROCHLORIDE 15; 6.25 MG/5ML; MG/5ML
5 SOLUTION ORAL EVERY 6 HOURS PRN
COMMUNITY

## 2021-01-01 RX ORDER — DEXAMETHASONE SODIUM PHOSPHATE 4 MG/ML
6 INJECTION, SOLUTION INTRA-ARTICULAR; INTRALESIONAL; INTRAMUSCULAR; INTRAVENOUS; SOFT TISSUE DAILY
Status: DISCONTINUED | OUTPATIENT
Start: 2021-01-01 | End: 2021-01-01

## 2021-01-01 RX ORDER — CALCIUM GLUCONATE 20 MG/ML
1 INJECTION, SOLUTION INTRAVENOUS ONCE
Status: COMPLETED | OUTPATIENT
Start: 2021-01-01 | End: 2021-01-01

## 2021-01-01 RX ORDER — SODIUM CHLORIDE 0.9 % (FLUSH) 0.9 %
10 SYRINGE (ML) INJECTION AS NEEDED
Status: DISCONTINUED | OUTPATIENT
Start: 2021-01-01 | End: 2021-01-01 | Stop reason: HOSPADM

## 2021-01-01 RX ORDER — MORPHINE SULFATE 2 MG/ML
INJECTION, SOLUTION INTRAMUSCULAR; INTRAVENOUS
Status: COMPLETED
Start: 2021-01-01 | End: 2021-01-01

## 2021-01-01 RX ORDER — HEPARIN SODIUM 10000 [USP'U]/100ML
11 INJECTION, SOLUTION INTRAVENOUS
Status: DISCONTINUED | OUTPATIENT
Start: 2021-01-01 | End: 2021-01-01

## 2021-01-01 RX ORDER — SODIUM CHLORIDE 0.9 % (FLUSH) 0.9 %
10 SYRINGE (ML) INJECTION EVERY 12 HOURS SCHEDULED
Status: DISCONTINUED | OUTPATIENT
Start: 2021-01-01 | End: 2021-03-15 | Stop reason: HOSPADM

## 2021-01-01 RX ORDER — DEXTROSE MONOHYDRATE 100 MG/ML
50 INJECTION, SOLUTION INTRAVENOUS CONTINUOUS
Status: DISCONTINUED | OUTPATIENT
Start: 2021-01-01 | End: 2021-01-01

## 2021-01-01 RX ORDER — LIDOCAINE HYDROCHLORIDE 10 MG/ML
5 INJECTION, SOLUTION EPIDURAL; INFILTRATION; INTRACAUDAL; PERINEURAL ONCE
Status: DISCONTINUED | OUTPATIENT
Start: 2021-01-01 | End: 2021-03-15 | Stop reason: HOSPADM

## 2021-01-01 RX ADMIN — SODIUM CHLORIDE, PRESERVATIVE FREE 3 ML: 5 INJECTION INTRAVENOUS at 08:28

## 2021-01-01 RX ADMIN — SODIUM CHLORIDE, PRESERVATIVE FREE 3 ML: 5 INJECTION INTRAVENOUS at 08:10

## 2021-01-01 RX ADMIN — IOPAMIDOL 80 ML: 612 INJECTION, SOLUTION INTRAVENOUS at 13:21

## 2021-01-01 RX ADMIN — SODIUM CHLORIDE 500 ML: 9 INJECTION, SOLUTION INTRAVENOUS at 13:37

## 2021-01-01 RX ADMIN — ALBUMIN HUMAN 250 ML: 0.05 INJECTION, SOLUTION INTRAVENOUS at 15:41

## 2021-01-01 RX ADMIN — PHENYLEPHRINE HYDROCHLORIDE 3 MCG/KG/MIN: 10 INJECTION INTRAVENOUS at 23:41

## 2021-01-01 RX ADMIN — PANTOPRAZOLE SODIUM 40 MG: 40 INJECTION, POWDER, FOR SOLUTION INTRAVENOUS at 19:40

## 2021-01-01 RX ADMIN — DOXYCYCLINE 100 MG: 100 INJECTION, POWDER, LYOPHILIZED, FOR SOLUTION INTRAVENOUS at 17:51

## 2021-01-01 RX ADMIN — NOREPINEPHRINE BITARTRATE 0.3 MCG/KG/MIN: 1 INJECTION INTRAVENOUS at 23:41

## 2021-01-01 RX ADMIN — VASOPRESSIN 0.04 UNITS/MIN: 20 INJECTION INTRAVENOUS at 15:48

## 2021-01-01 RX ADMIN — EPINEPHRINE 0.3 MCG/KG/MIN: 1 INJECTION INTRAMUSCULAR; INTRAVENOUS; SUBCUTANEOUS at 05:16

## 2021-01-01 RX ADMIN — DOXYCYCLINE 100 MG: 100 INJECTION, POWDER, LYOPHILIZED, FOR SOLUTION INTRAVENOUS at 19:39

## 2021-01-01 RX ADMIN — NOREPINEPHRINE BITARTRATE 0.3 MCG/KG/MIN: 1 INJECTION INTRAVENOUS at 11:25

## 2021-01-01 RX ADMIN — SODIUM BICARBONATE 200 MEQ: 84 INJECTION INTRAVENOUS at 11:40

## 2021-01-01 RX ADMIN — DEXTROSE MONOHYDRATE 50 ML: 25 INJECTION, SOLUTION INTRAVENOUS at 18:33

## 2021-01-01 RX ADMIN — NOREPINEPHRINE BITARTRATE 0.3 MCG/KG/MIN: 1 INJECTION INTRAVENOUS at 16:21

## 2021-01-01 RX ADMIN — SODIUM CHLORIDE 500 ML: 9 INJECTION, SOLUTION INTRAVENOUS at 14:59

## 2021-01-01 RX ADMIN — Medication 1 MG/HR: at 10:55

## 2021-01-01 RX ADMIN — SODIUM CHLORIDE, PRESERVATIVE FREE 10 ML: 5 INJECTION INTRAVENOUS at 20:32

## 2021-01-01 RX ADMIN — VASOPRESSIN 0.04 UNITS/MIN: 20 INJECTION INTRAVENOUS at 11:25

## 2021-01-01 RX ADMIN — ALBUMIN HUMAN 25 G: 0.25 SOLUTION INTRAVENOUS at 10:30

## 2021-01-01 RX ADMIN — DOXYCYCLINE 100 MG: 100 INJECTION, POWDER, LYOPHILIZED, FOR SOLUTION INTRAVENOUS at 08:07

## 2021-01-01 RX ADMIN — METRONIDAZOLE 500 MG: 500 INJECTION, SOLUTION INTRAVENOUS at 13:19

## 2021-01-01 RX ADMIN — PANTOPRAZOLE SODIUM 40 MG: 40 INJECTION, POWDER, FOR SOLUTION INTRAVENOUS at 10:18

## 2021-01-01 RX ADMIN — METRONIDAZOLE 500 MG: 500 INJECTION, SOLUTION INTRAVENOUS at 05:08

## 2021-01-01 RX ADMIN — DEXTROSE MONOHYDRATE: 25 INJECTION, SOLUTION INTRAVENOUS at 05:45

## 2021-01-01 RX ADMIN — Medication: at 06:39

## 2021-01-01 RX ADMIN — PHYTONADIONE 10 MG: 10 INJECTION, EMULSION INTRAMUSCULAR; INTRAVENOUS; SUBCUTANEOUS at 05:00

## 2021-01-01 RX ADMIN — PHENYLEPHRINE HYDROCHLORIDE 3 MCG/KG/MIN: 10 INJECTION INTRAVENOUS at 08:10

## 2021-01-01 RX ADMIN — CHLORHEXIDINE GLUCONATE 15 ML: 1.2 RINSE ORAL at 19:41

## 2021-01-01 RX ADMIN — DEXTROSE MONOHYDRATE 50 ML/HR: 100 INJECTION, SOLUTION INTRAVENOUS at 12:13

## 2021-01-01 RX ADMIN — NYSTATIN 500000 UNITS: 100000 SUSPENSION ORAL at 17:51

## 2021-01-01 RX ADMIN — CEFTRIAXONE 1 G: 1 INJECTION, POWDER, FOR SOLUTION INTRAMUSCULAR; INTRAVENOUS at 20:28

## 2021-01-01 RX ADMIN — SODIUM BICARBONATE 100 MEQ: 84 INJECTION INTRAVENOUS at 15:34

## 2021-01-01 RX ADMIN — VASOPRESSIN 0.04 UNITS/MIN: 20 INJECTION INTRAVENOUS at 03:29

## 2021-01-01 RX ADMIN — DEXTROSE MONOHYDRATE 100 ML: 25 INJECTION, SOLUTION INTRAVENOUS at 13:26

## 2021-01-01 RX ADMIN — EPINEPHRINE 0.3 MCG/KG/MIN: 1 INJECTION INTRAMUSCULAR; INTRAVENOUS; SUBCUTANEOUS at 12:35

## 2021-01-01 RX ADMIN — MORPHINE SULFATE 1 MG: 2 INJECTION, SOLUTION INTRAMUSCULAR; INTRAVENOUS at 17:47

## 2021-01-01 RX ADMIN — PHENYLEPHRINE HYDROCHLORIDE 3 MCG/KG/MIN: 10 INJECTION INTRAVENOUS at 08:05

## 2021-01-01 RX ADMIN — ALBUMIN HUMAN 25 G: 0.25 SOLUTION INTRAVENOUS at 20:28

## 2021-01-01 RX ADMIN — PIPERACILLIN SODIUM AND TAZOBACTAM SODIUM 3.38 G: 3; .375 INJECTION, POWDER, LYOPHILIZED, FOR SOLUTION INTRAVENOUS at 19:40

## 2021-01-01 RX ADMIN — SODIUM BICARBONATE: 84 INJECTION, SOLUTION INTRAVENOUS at 06:49

## 2021-01-01 RX ADMIN — SODIUM CHLORIDE, PRESERVATIVE FREE 3 ML: 5 INJECTION INTRAVENOUS at 01:20

## 2021-01-01 RX ADMIN — NOREPINEPHRINE BITARTRATE 0.3 MCG/KG/MIN: 1 INJECTION INTRAVENOUS at 18:47

## 2021-01-01 RX ADMIN — PIPERACILLIN SODIUM AND TAZOBACTAM SODIUM 3.38 G: 3; .375 INJECTION, POWDER, LYOPHILIZED, FOR SOLUTION INTRAVENOUS at 06:20

## 2021-01-01 RX ADMIN — NOREPINEPHRINE BITARTRATE 0.3 MCG/KG/MIN: 1 INJECTION INTRAVENOUS at 14:05

## 2021-01-01 RX ADMIN — NYSTATIN 500000 UNITS: 100000 SUSPENSION ORAL at 11:29

## 2021-01-01 RX ADMIN — PHENYLEPHRINE HYDROCHLORIDE 3 MCG/KG/MIN: 10 INJECTION INTRAVENOUS at 17:15

## 2021-01-01 RX ADMIN — OCTREOTIDE ACETATE 200 MCG: 500 INJECTION, SOLUTION INTRAVENOUS; SUBCUTANEOUS at 07:44

## 2021-01-01 RX ADMIN — ALBUMIN HUMAN 25 G: 0.25 SOLUTION INTRAVENOUS at 09:29

## 2021-01-01 RX ADMIN — NOREPINEPHRINE BITARTRATE 0.02 MCG/KG/MIN: 1 INJECTION INTRAVENOUS at 22:29

## 2021-01-01 RX ADMIN — NYSTATIN 500000 UNITS: 100000 SUSPENSION ORAL at 11:20

## 2021-01-01 RX ADMIN — EPINEPHRINE 0.26 MCG/KG/MIN: 1 INJECTION INTRAMUSCULAR; INTRAVENOUS; SUBCUTANEOUS at 15:05

## 2021-01-01 RX ADMIN — SODIUM CHLORIDE 500 ML: 9 INJECTION, SOLUTION INTRAVENOUS at 21:59

## 2021-01-01 RX ADMIN — METRONIDAZOLE 500 MG: 500 INJECTION, SOLUTION INTRAVENOUS at 14:15

## 2021-01-01 RX ADMIN — PIPERACILLIN SODIUM AND TAZOBACTAM SODIUM 3.38 G: 3; .375 INJECTION, POWDER, LYOPHILIZED, FOR SOLUTION INTRAVENOUS at 15:47

## 2021-01-01 RX ADMIN — SODIUM CHLORIDE 2000 ML: 9 INJECTION, SOLUTION INTRAVENOUS at 04:30

## 2021-01-01 RX ADMIN — EPINEPHRINE 0.3 MCG/KG/MIN: 1 INJECTION INTRAMUSCULAR; INTRAVENOUS; SUBCUTANEOUS at 18:47

## 2021-01-01 RX ADMIN — LACTULOSE 20 G: 10 SOLUTION ORAL at 12:13

## 2021-01-01 RX ADMIN — METRONIDAZOLE 500 MG: 500 INJECTION, SOLUTION INTRAVENOUS at 05:58

## 2021-01-01 RX ADMIN — CALCIUM GLUCONATE 1 G: 20 INJECTION, SOLUTION INTRAVENOUS at 12:12

## 2021-01-01 RX ADMIN — SODIUM CHLORIDE, POTASSIUM CHLORIDE, SODIUM LACTATE AND CALCIUM CHLORIDE 1000 ML: 600; 310; 30; 20 INJECTION, SOLUTION INTRAVENOUS at 17:27

## 2021-01-01 RX ADMIN — SODIUM BICARBONATE 150 MEQ: 84 INJECTION, SOLUTION INTRAVENOUS at 06:35

## 2021-01-01 RX ADMIN — KIT FOR THE PREPARATION OF TECHNETIUM TC 99M ALBUMIN AGGREGATED 1 DOSE: 2.5 INJECTION, POWDER, FOR SOLUTION INTRAVENOUS at 16:10

## 2021-01-01 RX ADMIN — ALBUMIN HUMAN 25 G: 0.25 SOLUTION INTRAVENOUS at 10:18

## 2021-01-01 RX ADMIN — PHENYLEPHRINE HYDROCHLORIDE 3 MCG/KG/MIN: 10 INJECTION INTRAVENOUS at 03:29

## 2021-01-01 RX ADMIN — SODIUM CHLORIDE 1000 ML: 9 INJECTION, SOLUTION INTRAVENOUS at 21:32

## 2021-01-01 RX ADMIN — DEXTROSE MONOHYDRATE 50 ML: 25 INJECTION, SOLUTION INTRAVENOUS at 03:41

## 2021-01-01 RX ADMIN — NOREPINEPHRINE BITARTRATE 0.3 MCG/KG/MIN: 1 INJECTION INTRAVENOUS at 11:01

## 2021-01-01 RX ADMIN — NOREPINEPHRINE BITARTRATE 0.3 MCG/KG/MIN: 1 INJECTION INTRAVENOUS at 05:16

## 2021-01-01 RX ADMIN — VANCOMYCIN HYDROCHLORIDE 1750 MG: 1 INJECTION, POWDER, LYOPHILIZED, FOR SOLUTION INTRAVENOUS at 16:47

## 2021-01-01 RX ADMIN — DOXYCYCLINE 100 MG: 100 INJECTION, POWDER, LYOPHILIZED, FOR SOLUTION INTRAVENOUS at 17:45

## 2021-01-01 RX ADMIN — OCTREOTIDE ACETATE 50 MCG: 100 INJECTION, SOLUTION INTRAVENOUS; SUBCUTANEOUS at 04:45

## 2021-01-01 RX ADMIN — HEPARIN SODIUM 11 UNITS/KG/HR: 10000 INJECTION, SOLUTION INTRAVENOUS at 00:22

## 2021-01-01 RX ADMIN — HEPARIN SODIUM 5000 UNITS: 5000 INJECTION INTRAVENOUS; SUBCUTANEOUS at 00:25

## 2021-01-01 RX ADMIN — SODIUM CHLORIDE 500 ML: 9 INJECTION, SOLUTION INTRAVENOUS at 18:39

## 2021-01-01 RX ADMIN — DOXYCYCLINE 100 MG: 100 INJECTION, POWDER, LYOPHILIZED, FOR SOLUTION INTRAVENOUS at 05:59

## 2021-01-01 RX ADMIN — EPINEPHRINE 0.02 MCG/KG/MIN: 1 INJECTION INTRAMUSCULAR; INTRAVENOUS; SUBCUTANEOUS at 07:45

## 2021-01-01 RX ADMIN — PANTOPRAZOLE SODIUM 40 MG: 40 INJECTION, POWDER, FOR SOLUTION INTRAVENOUS at 12:13

## 2021-01-01 RX ADMIN — EPINEPHRINE 0.3 MCG/KG/MIN: 1 INJECTION INTRAMUSCULAR; INTRAVENOUS; SUBCUTANEOUS at 09:09

## 2021-01-01 RX ADMIN — DOXYCYCLINE 100 MG: 100 INJECTION, POWDER, LYOPHILIZED, FOR SOLUTION INTRAVENOUS at 08:05

## 2021-01-01 RX ADMIN — PIPERACILLIN SODIUM AND TAZOBACTAM SODIUM 3.38 G: 3; .375 INJECTION, POWDER, LYOPHILIZED, FOR SOLUTION INTRAVENOUS at 19:50

## 2021-01-01 RX ADMIN — SODIUM CHLORIDE, PRESERVATIVE FREE 10 ML: 5 INJECTION INTRAVENOUS at 22:34

## 2021-01-01 RX ADMIN — SODIUM CHLORIDE, PRESERVATIVE FREE 3 ML: 5 INJECTION INTRAVENOUS at 20:20

## 2021-01-01 RX ADMIN — VASOPRESSIN 0.04 UNITS/MIN: 20 INJECTION INTRAVENOUS at 19:08

## 2021-01-01 RX ADMIN — SODIUM BICARBONATE 100 MEQ: 84 INJECTION INTRAVENOUS at 17:25

## 2021-01-01 RX ADMIN — DEXTROSE MONOHYDRATE 50 ML: 25 INJECTION, SOLUTION INTRAVENOUS at 18:39

## 2021-01-01 RX ADMIN — DEXTROSE MONOHYDRATE 20 ML/HR: 100 INJECTION, SOLUTION INTRAVENOUS at 06:38

## 2021-01-01 RX ADMIN — ALBUMIN HUMAN 25 G: 0.25 SOLUTION INTRAVENOUS at 08:46

## 2021-01-01 RX ADMIN — ALBUMIN HUMAN 25 G: 0.25 SOLUTION INTRAVENOUS at 21:22

## 2021-01-01 RX ADMIN — NYSTATIN 500000 UNITS: 100000 SUSPENSION ORAL at 22:00

## 2021-01-01 RX ADMIN — NYSTATIN 500000 UNITS: 100000 SUSPENSION ORAL at 08:28

## 2021-01-01 RX ADMIN — NYSTATIN 500000 UNITS: 100000 SUSPENSION ORAL at 08:10

## 2021-01-01 RX ADMIN — SODIUM CHLORIDE 1000 ML: 9 INJECTION, SOLUTION INTRAVENOUS at 20:52

## 2021-01-01 RX ADMIN — ALBUMIN HUMAN 25 G: 0.25 SOLUTION INTRAVENOUS at 10:00

## 2021-01-01 RX ADMIN — EPINEPHRINE 0.3 MCG/KG/MIN: 1 INJECTION INTRAMUSCULAR; INTRAVENOUS; SUBCUTANEOUS at 21:26

## 2021-01-01 RX ADMIN — ASPIRIN 324 MG: 81 TABLET, CHEWABLE ORAL at 18:40

## 2021-01-01 RX ADMIN — Medication 4 MG/HR: at 08:50

## 2021-01-01 RX ADMIN — DOXYCYCLINE 100 MG: 100 INJECTION, POWDER, LYOPHILIZED, FOR SOLUTION INTRAVENOUS at 05:52

## 2021-01-01 RX ADMIN — PHENYLEPHRINE HYDROCHLORIDE 3 MCG/KG/MIN: 10 INJECTION INTRAVENOUS at 11:23

## 2021-01-01 RX ADMIN — NYSTATIN 500000 UNITS: 100000 SUSPENSION ORAL at 20:20

## 2021-01-01 RX ADMIN — ALBUMIN HUMAN 25 G: 0.25 SOLUTION INTRAVENOUS at 09:24

## 2021-01-01 RX ADMIN — CHLORHEXIDINE GLUCONATE 15 ML: 1.2 RINSE ORAL at 12:04

## 2021-01-01 RX ADMIN — EPINEPHRINE 0.3 MCG/KG/MIN: 1 INJECTION INTRAMUSCULAR; INTRAVENOUS; SUBCUTANEOUS at 16:22

## 2021-01-01 RX ADMIN — ALBUMIN HUMAN 25 G: 0.25 SOLUTION INTRAVENOUS at 08:05

## 2021-01-01 RX ADMIN — OCTREOTIDE ACETATE 50 MCG/HR: 500 INJECTION, SOLUTION INTRAVENOUS; SUBCUTANEOUS at 18:47

## 2021-01-01 RX ADMIN — SODIUM CHLORIDE 100 ML/HR: 9 INJECTION, SOLUTION INTRAVENOUS at 00:26

## 2021-01-01 RX ADMIN — METRONIDAZOLE 500 MG: 500 INJECTION, SOLUTION INTRAVENOUS at 23:00

## 2021-01-01 RX ADMIN — PHENYLEPHRINE HYDROCHLORIDE 3 MCG/KG/MIN: 10 INJECTION INTRAVENOUS at 12:33

## 2021-01-01 RX ADMIN — PHENYLEPHRINE HYDROCHLORIDE 3 MCG/KG/MIN: 10 INJECTION INTRAVENOUS at 06:34

## 2021-01-01 RX ADMIN — MORPHINE SULFATE 1 MG: 2 INJECTION, SOLUTION INTRAMUSCULAR; INTRAVENOUS at 18:28

## 2021-01-01 RX ADMIN — REMDESIVIR 200 MG: 100 INJECTION, POWDER, LYOPHILIZED, FOR SOLUTION INTRAVENOUS at 06:48

## 2021-01-01 RX ADMIN — ALBUMIN HUMAN 250 ML: 0.05 INJECTION, SOLUTION INTRAVENOUS at 17:19

## 2021-01-01 RX ADMIN — NYSTATIN 500000 UNITS: 100000 SUSPENSION ORAL at 17:45

## 2021-01-01 RX ADMIN — DOXYCYCLINE 100 MG: 100 INJECTION, POWDER, LYOPHILIZED, FOR SOLUTION INTRAVENOUS at 20:28

## 2021-01-01 RX ADMIN — PHENYLEPHRINE HYDROCHLORIDE 3 MCG/KG/MIN: 10 INJECTION INTRAVENOUS at 16:20

## 2021-01-01 RX ADMIN — PHENYLEPHRINE HYDROCHLORIDE 3 MCG/KG/MIN: 10 INJECTION INTRAVENOUS at 14:06

## 2021-01-01 RX ADMIN — SODIUM BICARBONATE: 84 INJECTION, SOLUTION INTRAVENOUS at 06:48

## 2021-01-01 RX ADMIN — CHLORHEXIDINE GLUCONATE 15 ML: 1.2 RINSE ORAL at 08:07

## 2021-01-01 RX ADMIN — SODIUM BICARBONATE 100 MEQ: 84 INJECTION, SOLUTION INTRAVENOUS at 18:26

## 2021-01-01 RX ADMIN — PIPERACILLIN SODIUM AND TAZOBACTAM SODIUM 3.38 G: 3; .375 INJECTION, POWDER, LYOPHILIZED, FOR SOLUTION INTRAVENOUS at 06:47

## 2021-01-01 RX ADMIN — LACTULOSE 20 G: 10 SOLUTION ORAL at 19:40

## 2021-01-01 RX ADMIN — SODIUM CHLORIDE, PRESERVATIVE FREE 10 ML: 5 INJECTION INTRAVENOUS at 08:06

## 2021-01-01 RX ADMIN — PHENYLEPHRINE HYDROCHLORIDE 3 MCG/KG/MIN: 10 INJECTION INTRAVENOUS at 20:29

## 2021-01-01 RX ADMIN — ALBUMIN HUMAN 25 G: 0.25 SOLUTION INTRAVENOUS at 08:03

## 2021-03-04 NOTE — PROGRESS NOTES
"GASTROENTEROLOGY OFFICE NOTE  Janeth Mitchell  [id number]  1935    CARE TEAM  Patient Care Team:  Alexx Tafoya MD as PCP - General (Internal Medicine)     You have chosen to receive care through a telehealth visit.  Do you consent to use a video/audio connection for your medical care today? Yes        No ref. provider found     Chief Complaint   Patient presents with   • Follow-up   • Cirrhosis        HISTORY OF PRESENT ILLNESS:  Patient scheduled today to see me at the request of her primary care physician with concerns of elevated liver enzymes and worsening ascites.    Patient known to me with history of cirrhosis.  Status post ERCP in 2019.    She is noticing increasing abdominal distention and states that over the past 2 months her weight has increased by perhaps 30 pounds without any other localizing GI complaints.  Specifically she has not noted any dysphagia to solids or odynophagia but she does have some early satiety and feels full with very small meals.  She denies melena, bright red blood per rectum, constipation or diarrhea.    She is currently taking Lasix 20 mg daily and has normal renal function.  Specifically her creatinine is 0.85 on labs drawn on February 23.  BUN was 20.    Serum liver enzymes are noted to be elevated AST is elevated 76 units/L ALT is 28 units/L and total bilirubin is 3.3 (not fractionated).  Alkaline phosphatase is normal at 155 units/L.    CAT scan of February 25, 2021 revealed \"fairly large volume of ascites is noted \"small amount of pleural fluid in the left pleural space and the liver was nodular consistent with known cirrhosis.  Pancreatic mass was not identified.  There is no evidence of biliary ductal dilation.    She is referred to us for further work-up and evaluation of worsening liver enzymes and ascites/abdominal distention.        PAST MEDICAL HISTORY  Past Medical History:   Diagnosis Date   • Arthritis    • Cirrhosis (CMS/HCC)    • Hypertension    • " Pancreatitis due to common bile duct stone         PAST SURGICAL HISTORY  Past Surgical History:   Procedure Laterality Date   • CERVICAL POLYPECTOMY     • ERCP N/A 10/4/2019    Procedure: ENDOSCOPIC RETROGRADE CHOLANGIOPANCREATOGRAPHY;  Surgeon: Kranthi Hercules MD;  Location: [facility] ENDOSCOPY;  Service: Gastroenterology   • ERCP N/A 10/16/2019    Procedure: ENDOSCOPIC RETROGRADE CHOLANGIOPANCREATOGRAPHY;  Surgeon: Kranthi Hercules MD;  Location: [facility] ENDOSCOPY;  Service: Gastroenterology   • SKIN BIOPSY          MEDICATIONS:    Current Outpatient Medications:   •  aspirin 81 MG EC tablet, Take 81 mg by mouth Daily., Disp: , Rfl:   •  enalapril (VASOTEC) 20 MG tablet, TAKE ONE TABLET BY MOUTH EVERY DAY FOR BLOOD PRESSURE, Disp: , Rfl:   •  Ferrous Sulfate (IRON PO), Take 1 tablet by mouth Daily., Disp: , Rfl:   •  furosemide (LASIX) 20 MG tablet, Take 20 mg by mouth Daily., Disp: , Rfl:   •  potassium chloride (MICRO-K) 10 MEQ CR capsule, Take 10 mEq by mouth Daily., Disp: , Rfl:   •  triamterene-hydrochlorothiazide (MAXZIDE-25) 37.5-25 MG per tablet, TAKE ONE TABLET BY MOUTH ONE TIME DAILY FOR BLOODPRESSURE AND FLUID, Disp: , Rfl:     ALLERGIES  Allergies   Allergen Reactions   • Sulfa Antibiotics Angioedema     States she cant remember   • Sulfa Antibiotics Unknown (See Comments)     Pt unsure but states she is allergic       FAMILY HISTORY:  Family History   Problem Relation Age of Onset   • Diabetes Mother    • Cancer Mother    • Stroke Father    • Colon polyps Neg Hx    • Colon cancer Neg Hx        SOCIAL HISTORY  Social History     Socioeconomic History   • Marital status:      Spouse name: Not on file   • Number of children: Not on file   • Years of education: Not on file   • Highest education level: Not on file   Tobacco Use   • Smoking status: Never Smoker   • Smokeless tobacco: Never Used   Substance and Sexual Activity   • Alcohol use: No     Frequency: Never   • Drug  use: No   • Sexual activity: Defer     Socioeconomic History:  .  Lives alone.  Spends nights at her daughter's home.  Non-smoker.  Nondrinker.       REVIEW OF SYSTEMS  Review of Systems   Constitutional: Positive for appetite change, chills and fatigue. Negative for activity change, diaphoresis, fever, unexpected weight gain and unexpected weight loss.   HENT: Positive for trouble swallowing. Negative for voice change.    Gastrointestinal: Positive for abdominal distention, constipation and nausea. Negative for abdominal pain, anal bleeding, blood in stool, diarrhea, rectal pain, vomiting, GERD and indigestion.     I reviewed the above-noted review of systems pertinent/active issues are noted in today's HPI    PHYSICAL EXAM   There were no vitals taken for this visit.  General: Elderly frail female in no acute distress accompanied by her daughter.  HEENT: Scleral icterus is apparent  Neck: No obvious rigidity.  Lungs: Speaking in full sentences without obvious labored breathing or audible wheezing  Abdomen: Obviously distended abdomen was visualized and was consistent with ascites  Extremeties: Trace pitting edema was demonstrated  Neurologic:  Alert and oriented x 3 without focal motor or sensory deficits  Rectal exam: deferred     Results for orders placed or performed during the hospital encounter of 02/25/21   POC Creatinine    Specimen: Blood   Result Value Ref Range    Creatinine 0.70 0.60 - 1.30 mg/dL      Results Review:  I reviewed the patient's new clinical results.      ASSESSMENT  1.-Cirrhosis.  DHILLON related.  Evidence of decompensation with worsening ascites  2.-Questionable IPMN of the pancreatic head.  Most recent CAT scan did not reveal any pancreatic head abnormalities  3.-Asymptomatic cholelithiasis without evidence of biliary ductal dilation..  She is denying fevers, chills, and is not felt to have choledocholithiasis clinically or on CAT scan  4.-History of gallstone pancreatitis.  She is  status post ERCP with biliary sphincterotomy which also would minimize the chance of choledocholithiasis    PLAN  1.-Add Aldactone 50 mg/day with weekly BMP and daily weights.  Patient's daughter will catch up with us weekly to give us an update on her weight and in 4 weeks we will catch up formally.  2.-Consider paracentesis for worsening ascites refractory to diuretic therapy.  Her ascites did not appear tense and she is not short of breath but, again, consider if worsening  3.-Overall poor prognosis elderly female with decompensated cirrhosis at age 86.      I discussed the patient's findings and my recommendations with patient    Kranthi Hercules MD  3/4/2021   16:03 EST    Much of this note is an electronic transcription of spoken language to printed text. Electronic transcription of spoken language may permit erroneous, nonsensical word phrases to be inadvertently transcribed.  Although I have reviewed the note for these errors, some may still be present.

## 2021-03-05 PROBLEM — R18.8 OTHER ASCITES: Status: ACTIVE | Noted: 2021-01-01

## 2021-03-08 PROBLEM — A41.9 SEPSIS (HCC): Status: ACTIVE | Noted: 2021-01-01

## 2021-03-08 PROBLEM — R60.0 MILD PERIPHERAL EDEMA: Status: RESOLVED | Noted: 2020-01-01 | Resolved: 2021-01-01

## 2021-03-08 PROBLEM — A49.9 UTI (URINARY TRACT INFECTION), BACTERIAL: Status: RESOLVED | Noted: 2019-10-04 | Resolved: 2021-01-01

## 2021-03-08 PROBLEM — R55 SYNCOPE: Status: RESOLVED | Noted: 2019-10-04 | Resolved: 2021-01-01

## 2021-03-08 PROBLEM — N39.0 UTI (URINARY TRACT INFECTION), BACTERIAL: Status: RESOLVED | Noted: 2019-10-04 | Resolved: 2021-01-01

## 2021-03-08 NOTE — ED PROVIDER NOTES
Subjective   86-year-old female with past medical history of cirrhosis, hypertension, and pancreatitis secondary to CBD stone presents to the emergency room for nausea, vomiting, and abdominal pain which began yesterday.  Patient states her PCP, Dr. Tafoya sent her here to have fluid drained from her abdomen.  She denies fever.  She does state that she has had a little bit of chest pain with mild shortness of breath.  Daughter is present at bedside and states that patient has also had a difficult time swallowing and has had early satiety recently, but also feels that she is if her food is getting stuck in her throat.  Daughter also states that she has noticed a significant increase in abdominal girth over the past 1 week.  Denies being around any sick contacts or travel.  Denies any other complaints or concerns at this time.  Patient states she follows with Dr. Bejarano's, gastroenterologist.      History provided by:  Patient      Review of Systems   Constitutional: Positive for appetite change and unexpected weight change. Negative for fever.   HENT: Negative.    Respiratory: Negative.    Cardiovascular: Negative.  Negative for chest pain.   Gastrointestinal: Positive for abdominal pain, nausea and vomiting.   Endocrine: Negative.    Genitourinary: Negative.  Negative for dysuria.   Skin: Negative.    Neurological: Negative.    Psychiatric/Behavioral: Negative.    All other systems reviewed and are negative.      Past Medical History:   Diagnosis Date   • Arthritis    • Cirrhosis (CMS/HCC)    • Gallstone pancreatitis 10/4/2019   • Hypertension    • IPMN (intraductal papillary mucinous neoplasm) 9/11/2020   • Pancreatitis due to common bile duct stone    • Pseudocyst of pancreas 10/4/2019       Allergies   Allergen Reactions   • Sulfa Antibiotics Angioedema     States she cant remember   • Sulfa Antibiotics Unknown (See Comments)     Pt unsure but states she is allergic       Past Surgical History:   Procedure  Laterality Date   • CERVICAL POLYPECTOMY     • ERCP N/A 10/4/2019    Procedure: ENDOSCOPIC RETROGRADE CHOLANGIOPANCREATOGRAPHY;  Surgeon: Kranthi Hercules MD;  Location:  [facility];  Service: Gastroenterology   • ERCP N/A 10/16/2019    Procedure: ENDOSCOPIC RETROGRADE CHOLANGIOPANCREATOGRAPHY;  Surgeon: Kranthi Hercules MD;  Location:  [facility];  Service: Gastroenterology   • SKIN BIOPSY         Family History   Problem Relation Age of Onset   • Diabetes Mother    • Cancer Mother    • Stroke Father    • Colon polyps Neg Hx    • Colon cancer Neg Hx        Social History     Socioeconomic History   • Marital status:      Spouse name: Not on file   • Number of children: Not on file   • Years of education: Not on file   • Highest education level: Not on file   Tobacco Use   • Smoking status: Never Smoker   • Smokeless tobacco: Never Used   Vaping Use   • Vaping Use: Never used   Substance and Sexual Activity   • Alcohol use: No   • Drug use: No   • Sexual activity: Defer           Objective   Physical Exam  Vitals and nursing note reviewed.   Constitutional:       General: She is not in acute distress.     Appearance: She is well-developed. She is not diaphoretic.      Comments: Appears chronically ill   HENT:      Head: Normocephalic and atraumatic.      Right Ear: External ear normal.      Left Ear: External ear normal.      Nose: Nose normal.   Eyes:      Conjunctiva/sclera: Conjunctivae normal.      Pupils: Pupils are equal, round, and reactive to light.   Neck:      Vascular: No JVD.      Trachea: No tracheal deviation.   Cardiovascular:      Rate and Rhythm: Normal rate and regular rhythm.      Heart sounds: Normal heart sounds. No murmur.   Pulmonary:      Effort: Pulmonary effort is normal. No respiratory distress.      Breath sounds: Normal breath sounds. No wheezing.   Abdominal:      General: Bowel sounds are normal. There is distension.      Palpations: Abdomen  is soft.      Tenderness: There is abdominal tenderness.   Musculoskeletal:         General: No deformity. Normal range of motion.      Cervical back: Normal range of motion and neck supple.      Right lower leg: 3+ Edema present.      Left lower leg: 3+ Edema present.   Skin:     General: Skin is warm and dry.      Coloration: Skin is not pale.      Findings: No erythema or rash.   Neurological:      Mental Status: She is alert and oriented to person, place, and time.      Cranial Nerves: No cranial nerve deficit.   Psychiatric:         Behavior: Behavior normal.         Thought Content: Thought content normal.         Procedures           ED Course  ED Course as of Mar 08 2255   Mon Mar 08, 2021   1914 IMPRESSION:   Stable chest. No acute cardiopulmonary findings.    This report was finalized on 3/8/2021 7:11 PM by Dr. Go Vann MD.    XR Chest 1 View [TK]   2219 IMPRESSION:     1. Cirrhosis with stigmata of portal hypertension. Large volume of ascites in the abdomen and pelvis.  2. Indeterminate, but suspicious masses within the liver. Hepatocellular carcinoma could present similarly. Similar findings on prior study 2/25/2021. Correlation with alpha-fetoprotein levels recommended.  3. The gallbladder is dilated and there is cholelithiasis. No distinct evidence of acute cholecystitis. Laboratory data would be complementary.  4. Hiatal hernia.  5. Diverticulosis.  6. Appendix not visualized or assessed.          Signer Name: Yanick Silva MD  Signed: 3/8/2021 10:09 PM  Workstation Name: [id number]   [facility]    CT Abdomen Pelvis Without Contrast [TK]   2220 IMPRESSION:   No definite, pharyngeal, laryngeal or esophageal abnormality is definitively identified. No definite etiology for the patient's dysphagia is identified.      Signer Name: Tequila Maria MD  Signed: 3/8/2021 10:04 PM  Workstation Name: [id number]   [facility]    CT Soft Tissue Neck Without  Contrast [TK]   2221 IMPRESSION:   1. There is bibasilar consolidation/atelectasis with small bilateral pleural effusions. There may be potential underlying infection; Findings may also be seen with aspiration pneumonia.  2. There is a prominent hiatal hernia containing fluid. It is uncertain if this hernia is paraesophageal.  3. There is ascites with cirrhosis and gallstones.    Signer Name: Tequila Maria MD  Signed: 3/8/2021 10:17 PM  Workstation Name: [id number]   [facility]    CT Chest Without Contrast Diagnostic [TK]   2225 Paged hospitalist     [TK]   8527 Spoke with Dr. Munson, she will admit pt to hospitalist services for further evaluation and treatment.    [TK]      ED Course User Index  [TK] René Perry, MARIELY                                           MDM  Number of Diagnoses or Management Options  Bilateral pleural effusion: new and requires workup  Dysphagia, unspecified type: new and requires workup  Liver lesion: new and requires workup  Other ascites: new and requires workup  Pneumonia due to infectious organism, unspecified laterality, unspecified part of lung: new and requires workup  Sepsis, due to unspecified organism, unspecified whether acute organ dysfunction present (CMS/HCC): new and requires workup     Amount and/or Complexity of Data Reviewed  Clinical lab tests: reviewed and ordered  Tests in the radiology section of CPT®: reviewed and ordered  Tests in the medicine section of CPT®: reviewed and ordered  Discuss the patient with other providers: yes ([place])    Risk of Complications, Morbidity, and/or Mortality  Presenting problems: moderate  Diagnostic procedures: moderate  Management options: moderate    Patient Progress  Patient progress: stable      Final diagnoses:   Sepsis, due to unspecified organism, unspecified whether acute organ dysfunction present (CMS/HCC)   Other ascites   Liver lesion   Bilateral pleural effusion   Pneumonia due to infectious  organism, unspecified laterality, unspecified part of lung   Dysphagia, unspecified type            René Perry PA-C  03/08/21 5061

## 2021-03-09 NOTE — PROGRESS NOTES
[facility] HOSPITALIST PROGRESS NOTE     Patient Identification:  Name:  Janeth Mitchell  Age:  86 y.o.  Sex:  female  :  1935  MRN:  [id number]  Visit Number:  [id number]  Primary Care Provider:  Alexx Tafoya MD    Length of stay:  1    Chief complaint: Throat pain    Subjective:    Patient reports moderate to severe pain when attempting to swallow and states that she has had decreased oral intake because of this.  She denies any fevers or chills.  Did have lengthy discussion with patient regarding findings on CT scan with new liver masses and worsening massive ascites.  Did discuss possibility of pancreatic or liver cancer.  Patient states she would like to proceed with treatment if there is a treatment possible.  Did discuss with patient and her daughter obtain CEA, AFP and CA 19 and making further clinical decisions based on these results.  ----------------------------------------------------------------------------------------------------------------------  Current Hospital Meds:  doxycycline, 100 mg, Intravenous, Q12H  enalapril, 20 mg, Oral, Daily  metroNIDAZOLE, 500 mg, Intravenous, Q8H  nystatin, 5 mL, Oral, 4x Daily  sodium chloride, 3 mL, Intravenous, Q12H         ----------------------------------------------------------------------------------------------------------------------  Vital Signs:  Temp:  [97.7 °F (36.5 °C)-98.3 °F (36.8 °C)] 98.3 °F (36.8 °C)  Heart Rate:  [] 120  Resp:  [18] 18  BP: (117-166)/(50-97) 120/55      21  1810 21  0010   Weight: 81.6 kg (180 lb) 83.7 kg (184 lb 9.6 oz)     Body mass index is 36.05 kg/m².    Intake/Output Summary (Last 24 hours) at 3/9/2021 1731  Last data filed at 3/9/2021 0924  Gross per 24 hour   Intake 1320 ml   Output --   Net 1320 ml     Diet Regular; Cardiac, Consistent  Carbohydrate  ----------------------------------------------------------------------------------------------------------------------  Physical exam:  This physical exam has been personally performed remotely in the unit aided by real-time audio/visual communication tools.  Patient's nurse present at bedside during this exam and assisted during exam. The use of a video visit has been reviewed with the patient and verbal informed consent has been obtained.     Physical Exam:  General: Patient appears awake, alert, and in no acute distress.  Head: Mild temporal wasting is noted, atraumatic  Eyes: EOMI. Conjunctivae and sclerae normal.  Ears: Ears appear intact with no abnormalities noted.   Neck: Trachea midline. No obvious JVD.  Lungs: Respirations appear to be regular, even and unlabored with no signs of respiratory distress. No audible wheezing.  Abdomen: No obvious abdominal distension.  MS: Muscle tone appears normal. No gross deformities.  Extremities: No clubbing, cyanosis or edema noted.  Skin: No visible bleeding, bruising, or rash.  Neurologic: Alert and oriented x3. No gross focal deficits.     ----------------------------------------------------------------------------------------------------------------------  Tele:    ----------------------------------------------------------------------------------------------------------------------  Results from last 7 days   Lab Units 03/08/21 2101 03/08/21  1829   TROPONIN T ng/mL <0.010 <0.010     Results from last 7 days   Lab Units 03/09/21  0537 03/08/21  2252 03/08/21 1942 03/08/21  1829   CRP mg/dL 6.56*  --   --  3.61*   LACTATE mmol/L 3.2* 3.1* 3.2*  --    WBC 10*3/mm3 16.67*  --   --  19.05*   HEMOGLOBIN g/dL 13.0  --   --  14.0   HEMATOCRIT % 39.6  --   --  40.8   MCV fL 103.4*  --   --  100.5*   MCHC g/dL 32.8  --   --  34.3   PLATELETS 10*3/mm3 175  --   --  212   INR   --   --   --  1.62*     Results from last 7 days   Lab Units 03/08/21  1838   PH,  ARTERIAL pH units 7.475*   PO2 ART mm Hg 73.3*   PCO2, ARTERIAL mm Hg 31.5*   HCO3 ART mmol/L 23.2     Results from last 7 days   Lab Units 03/09/21  0537 03/08/21  1829   SODIUM mmol/L 136 136   POTASSIUM mmol/L 5.2 5.2   CHLORIDE mmol/L 105 105   CO2 mmol/L 22.7 20.6*   BUN mg/dL 34* 27*   CREATININE mg/dL 1.06* 1.01*   EGFR IF NONAFRICN AM mL/min/1.73 49* 52*   CALCIUM mg/dL 9.6 9.9   GLUCOSE mg/dL 131* 140*   ALBUMIN g/dL 2.11* 2.36*   BILIRUBIN mg/dL 2.2* 2.4*   ALK PHOS U/L 125* 148*   AST (SGOT) U/L 72* 74*   ALT (SGPT) U/L 27 28   Estimated Creatinine Clearance: 37.9 mL/min (A) (by C-G formula based on SCr of 1.06 mg/dL (H)).    No results found for: AMMONIA      No results found for: BLOODCX  No results found for: URINECX  No results found for: WOUNDCX  No results found for: STOOLCX    I have personally looked at the labs and they are summarized above.  ----------------------------------------------------------------------------------------------------------------------  Imaging Results (Last 24 Hours)     ** No results found for the last 24 hours. **        ----------------------------------------------------------------------------------------------------------------------  Assessment and Plan:    1.  Severe sepsis -present on admission, slowly improving.  Likely secondary to aspiration pneumonia.  We will continue doxycycline and Flagyl.    2.  Possible GI malignancy -patient with history of pancreatic intraductal papillary neoplasm, there is some concern of possible metastatic disease.  Alpha-fetoprotein currently pending.  I did discuss this case with patient's primary gastroenterologist.  Will await further testing and make plan of care decisions based upon this.    3.  Suspected Candida esophagitis -continue nystatin swish and swallow    4.  Massive ascites -will defer paracentesis at this time.  Pending patient's clinical course, may benefit from therapeutic paracentesis prior to discharge.    5.   Acute kidney injury -serum creatinine mildly elevated compared to baseline.  Patient may be entering into hepatorenal syndrome.  We will continue to monitor closely.    6.  Suspect severe protein/calorie malnutrition -we will consult nutrition    7.  Essential hypertension -currently well controlled        Rivas Salazar, DO   03/09/21   17:34 EST         Rivas Salazar, DO  03/09/21  17:34 EST

## 2021-03-09 NOTE — NURSING NOTE
Attempted multiple times to return a call to the patient's daughter Mavis Trujillo, per the patient's request to relay password and answer questions, however  no answer. The day shift nurse will try again later.

## 2021-03-09 NOTE — H&P
"    [facility] Medicine Services  History & Physical    Patient Identification:  Name:  Janeth Mitchell  Age:  86 y.o.  Sex:  female  :  1935  MRN:  [id number]   Visit Number:  [id number]  Primary Care Physician:  Alexx Tafoya MD    Subjective     3/8/2021   Chief complaint: vomiting    History of presenting illness:      Janeth Mitchell is a 86 y.o. female with past medical history significant for essential hypertension and arthritis.     Patient presents to [facility] Emergency Department with complaints of vomiting that started yesterday. Patient reports that she has been having chest and neck discomfort for the past two days that comes and goes. She reports that the pain is mostly \"burning.\" She states that the nystatin suspension that she received in the ED helped a lot. She reports that she has also had abdominal swelling and her abdomen feels very distended. She states that she has generalized abdominal discomfort and nausea. She follows with Dr. Hercules, Gastroenterology, outpatient who monitors her liver cirrhosis. Patient has never required a paracentesis in the past. Patient states that she also had a cough that developed a couple days ago that is nonproductive. She has been short of breath with ambulation. She tested positive for COVID-19 during this admission and states that this is the first time she has tested positive. She states that she has been around someone who tested positive, but they were released from quarantine days prior to being around her. Patient has not received the COVID vaccine.     Upon arrival to the ED, vital signs were temperature 97.7, pulse 98, respirations 18, /97, SpO2 92% on room air. ABG shows pH 7.475, pCO2 31.5, pO2 73.3, HCO3 23.2, with oxygen saturation of 95.9 on room air. Troponin T negative x 2. CMP shows glucose 140, CO2 20.6, creatinine 1.01, BUN 27, BUN/Cr 26.7, eGFR 52, alkaline phosphatase 148, AST " 74, total bilirubin 2.4, albumin 2.36, otherwise within normal limits. C-RP 3.61. Lactate 3.2 with reflex 3.1. Lipase 16. CBC shows WBC 19.05, .5, MCH 34.5, otherwise within normal limits. UA shows trace ketones, 3+ blood, positive nitrites, 1+ leukocytes, trace protein, 1+ bilirubin, 21-30 RBC, 6-12 WBC, trace bacteria, 3-6 squamous epithelium. Pending blood cultures x 2.  CXR shows stable chest with no acute cardiopulmonary findings. CT soft tissue of neck shows no definite pharyngeal, laryngeal, or esophageal abnormality.  CT chest shows bibasilar consolidation/atelectasis with small bilateral pleural effusions with possible aspiration pneumonia; prominent hiatal hernia with fluid; ascites with cirrhosis and gallstones. CT abdomen/pelvis shows cirrhosis with stigmata of portal hypertension and large volume of ascites in abdomen/pelvis; indeterminite but suspicious masses within the liver with possible hepatocellular carcinoma and similar findings on prior study from 02/25/2021; gallbladder dilation with chloelithiasis. COVID-19 positive.     Known Emergency Department medications received prior to my evaluation included aspirin 324 mg, nystatin, IV Zosyn 3.375, 2 L fluid bolus. Room location at the time of my evaluation was 342 A.     ---------------------------------------------------------------------------------------------------------------------   Review of Systems   Constitutional: Negative for activity change, chills, fatigue and fever.   HENT: Negative for congestion, sinus pain and sore throat.    Eyes: Negative for discharge and redness.   Respiratory: Positive for cough (nonproductive) and shortness of breath (with ambulation). Negative for apnea and wheezing.    Cardiovascular: Positive for chest pain (burning). Negative for palpitations.   Gastrointestinal: Positive for abdominal distention, abdominal pain (generalized), nausea and vomiting. Negative for diarrhea.   Endocrine: Negative for  polydipsia, polyphagia and polyuria.   Genitourinary: Negative for difficulty urinating, dysuria, frequency, hematuria and urgency.   Musculoskeletal: Negative for arthralgias, gait problem and joint swelling.   Skin: Negative for color change, rash and wound.   Neurological: Negative for dizziness, syncope, weakness and headaches.   Psychiatric/Behavioral: Negative for agitation, behavioral problems and confusion.        ---------------------------------------------------------------------------------------------------------------------   Past Medical History:   Diagnosis Date   • Arthritis    • Cirrhosis (CMS/HCC)    • Gallstone pancreatitis 10/4/2019   • Hypertension    • IPMN (intraductal papillary mucinous neoplasm) 9/11/2020   • Pancreatitis due to common bile duct stone    • Pseudocyst of pancreas 10/4/2019     Past Surgical History:   Procedure Laterality Date   • CERVICAL POLYPECTOMY     • ERCP N/A 10/4/2019    Procedure: ENDOSCOPIC RETROGRADE CHOLANGIOPANCREATOGRAPHY;  Surgeon: Kranthi Hercules MD;  Location:  [facility] ENDOSCOPY;  Service: Gastroenterology   • ERCP N/A 10/16/2019    Procedure: ENDOSCOPIC RETROGRADE CHOLANGIOPANCREATOGRAPHY;  Surgeon: Kranthi Hercules MD;  Location:  [facility] ENDOSCOPY;  Service: Gastroenterology   • SKIN BIOPSY       Family History   Problem Relation Age of Onset   • Diabetes Mother    • Cancer Mother    • Stroke Father    • Colon polyps Neg Hx    • Colon cancer Neg Hx      Social History     Socioeconomic History   • Marital status:      Spouse name: Not on file   • Number of children: Not on file   • Years of education: Not on file   • Highest education level: Not on file   Tobacco Use   • Smoking status: Never Smoker   • Smokeless tobacco: Never Used   Vaping Use   • Vaping Use: Never used   Substance and Sexual Activity   • Alcohol use: No   • Drug use: No   • Sexual activity: Defer          ---------------------------------------------------------------------------------------------------------------------   Allergies:  Sulfa antibiotics and Sulfa antibiotics  ---------------------------------------------------------------------------------------------------------------------   Home medications:    Medications below are reported home medications pulling from within the system; at this time, these medications have not been reconciled unless otherwise specified and are in the verification process for further verifcation as current home medications.  Medications Prior to Admission   Medication Sig Dispense Refill Last Dose   • enalapril (VASOTEC) 20 MG tablet TAKE ONE TABLET BY MOUTH EVERY DAY FOR BLOOD PRESSURE   3/8/2021 at noon   • furosemide (LASIX) 20 MG tablet Take 20 mg by mouth Daily.   3/8/2021 at noon   • spironolactone (Aldactone) 50 MG tablet Take 1 tablet by mouth Daily. 30 tablet 6 3/8/2021 at [facility] Scheduled Meds:  sodium chloride, 3 mL, Intravenous, Q12H           Current listed hospital scheduled medications may not yet reflect those currently placed in orders that are signed and held awaiting patient's arrival to floor.   ---------------------------------------------------------------------------------------------------------------------     Objective     Vital Signs:  Temp:  [97.7 °F (36.5 °C)-98 °F (36.7 °C)] 98 °F (36.7 °C)  Heart Rate:  [94-99] 97  Resp:  [18] 18  BP: (118-166)/(57-97) 118/57      03/08/21  1810 03/09/21  0010   Weight: 81.6 kg (180 lb) 83.7 kg (184 lb 9.6 oz)     Body mass index is 36.05 kg/m².  ---------------------------------------------------------------------------------------------------------------------       Physical Exam  Constitutional:       Appearance: Normal appearance. She is obese.   HENT:      Head: Normocephalic and atraumatic.      Nose: Nose normal.      Mouth/Throat:      Mouth: Mucous membranes are moist.      Pharynx:  Oropharynx is clear.   Eyes:      Extraocular Movements: Extraocular movements intact.      Conjunctiva/sclera: Conjunctivae normal.      Pupils: Pupils are equal, round, and reactive to light.   Cardiovascular:      Rate and Rhythm: Normal rate and regular rhythm.      Pulses: Normal pulses.           Dorsalis pedis pulses are 2+ on the right side and 2+ on the left side.        Posterior tibial pulses are 2+ on the right side and 2+ on the left side.      Heart sounds: Normal heart sounds. No murmur.   Pulmonary:      Effort: Pulmonary effort is normal. No respiratory distress.      Breath sounds: Normal breath sounds. No wheezing or rhonchi.   Abdominal:      General: Bowel sounds are decreased. There is distension.      Palpations: There is hepatomegaly.      Tenderness: There is abdominal tenderness.   Musculoskeletal:         General: Swelling present. Normal range of motion.      Cervical back: Normal range of motion and neck supple.      Right lower leg: Edema (chronic) present.      Left lower leg: Edema (chronic) present.   Skin:     General: Skin is warm and dry.   Neurological:      General: No focal deficit present.      Mental Status: She is alert and oriented to person, place, and time. Mental status is at baseline.   Psychiatric:         Mood and Affect: Mood normal.         Behavior: Behavior normal.         Thought Content: Thought content normal.         ---------------------------------------------------------------------------------------------------------------------  EKG:    Pending cardiologists interpretation. Per my interpretation, EKG shows normal sinus rhythm with nonspecific ST/T wave abnormality and  and QTc 417 ms.     Telemetry:   Telemetry shows normal sinus rhythm with  bpm and SpO2 92% on 1 lpm nasal cannula.     I have personally looked at both the EKG and the telemetry  strips.  ---------------------------------------------------------------------------------------------------------------------   Results from last 7 days   Lab Units 03/08/21 2252 03/08/21 1942 03/08/21  1829   CRP mg/dL  --   --  3.61*   LACTATE mmol/L 3.1* 3.2*  --    WBC 10*3/mm3  --   --  19.05*   HEMOGLOBIN g/dL  --   --  14.0   HEMATOCRIT %  --   --  40.8   MCV fL  --   --  100.5*   MCHC g/dL  --   --  34.3   PLATELETS 10*3/mm3  --   --  212   INR   --   --  1.62*     Results from last 7 days   Lab Units 03/08/21  1838   PH, ARTERIAL pH units 7.475*   PO2 ART mm Hg 73.3*   PCO2, ARTERIAL mm Hg 31.5*   HCO3 ART mmol/L 23.2     Results from last 7 days   Lab Units 03/08/21  1829   SODIUM mmol/L 136   POTASSIUM mmol/L 5.2   CHLORIDE mmol/L 105   CO2 mmol/L 20.6*   BUN mg/dL 27*   CREATININE mg/dL 1.01*   EGFR IF NONAFRICN AM mL/min/1.73 52*   CALCIUM mg/dL 9.9   GLUCOSE mg/dL 140*   ALBUMIN g/dL 2.36*   BILIRUBIN mg/dL 2.4*   ALK PHOS U/L 148*   AST (SGOT) U/L 74*   ALT (SGPT) U/L 28   Estimated Creatinine Clearance: 39.8 mL/min (A) (by C-G formula based on SCr of 1.01 mg/dL (H)).  No results found for: AMMONIA  Results from last 7 days   Lab Units 03/08/21 2101 03/08/21  1829   TROPONIN T ng/mL <0.010 <0.010         No results found for: HGBA1C  No results found for: TSH, FREET4  No results found for: PREGTESTUR, PREGSERUM, HCG, HCGQUANT  Pain Management Panel    There is no flowsheet data to display.           ---------------------------------------------------------------------------------------------------------------------  Imaging Results (Last 7 Days)     Procedure Component Value Units Date/Time    CT Chest Without Contrast Diagnostic [[id number]] Collected: 03/08/21 2217     Updated: 03/08/21 2219    Narrative:      CT Chest WO    INDICATION:   Chest pain and shortness of breath    TECHNIQUE:   CT of the thorax without IV contrast. Coronal and sagittal reconstructions were obtained.  Radiation dose  reduction techniques included automated exposure control or exposure modulation based on body size. Count of known CT and cardiac nuc med studies  performed in previous 12 months: 0.     COMPARISON:   None available.    FINDINGS:  Granuloma is seen in the right midlung field. There are bilateral pleural effusions with bibasilar consolidation/atelectasis. There is hiatal hernia with fluid. It is uncertain if this may be paraesophageal. There is ascites with cirrhosis and  gallstones. No acute osseous abnormality.      Impression:      1. There is bibasilar consolidation/atelectasis with small bilateral pleural effusions. There may be potential underlying infection; Findings may also be seen with aspiration pneumonia.  2. There is a prominent hiatal hernia containing fluid. It is uncertain if this hernia is paraesophageal.  3. There is ascites with cirrhosis and gallstones.    Signer Name: Tequila Maria MD   Signed: 3/8/2021 10:17 PM   Workstation Name: [id number]    [facility]    CT Abdomen Pelvis Without Contrast [[id number]] Collected: 03/08/21 2209     Updated: 03/08/21 2211    Narrative:      CT Abdomen Pelvis WO    INDICATION:   86-year-old female with abdominal pain nausea vomiting and history of pancreatitis and cirrhosis    TECHNIQUE:   CT of the abdomen and pelvis without IV contrast. Coronal and sagittal reconstructions were obtained.  Radiation dose reduction techniques included automated exposure control or exposure modulation based on body size. Count of known CT and cardiac nuc  med studies performed in previous 12 months: 1.     COMPARISON:   2/5/2021    FINDINGS:  Abdomen: Small bilateral pleural effusions and probable compressive atelectasis or less likely. Large hiatal hernia and partial intrathoracic stomach. Aorta demonstrates advanced atherosclerotic change but there is no aneurysm. The and adrenal glands are  negative pancreas is unremarkable. Kidneys are nonobstructed. The  liver is cirrhotic. Although limited by noncontrast technique there are probable masses in the dome of the central liver and right hepatic lobe measuring up to 2.7 cm and 3.2 cm  respectively. Indeterminate low-attenuation lesion left hepatic lobe measuring 10 mm. Malignancy could present similarly. The gallbladder is dilated and there is cholelithiasis. Large volume of ascites. There are gastrohepatic and probable small  esophageal varices. Incidental duodenal diverticulum, stable.    Pelvis: Large volume of ascites pelvis. The bladder is unremarkable. No drainable fluid collection in the pelvis or adnexal mass. Diverticulosis. Appendix not identified but no secondary signs sightedness. Inguinal canals are negative. At least moderate  anasarca. No suspicious bone lesion.      Impression:        1. Cirrhosis with stigmata of portal hypertension. Large volume of ascites in the abdomen and pelvis.  2. Indeterminate, but suspicious masses within the liver. Hepatocellular carcinoma could present similarly. Similar findings on prior study 2/25/2021. Correlation with alpha-fetoprotein levels recommended.  3. The gallbladder is dilated and there is cholelithiasis. No distinct evidence of acute cholecystitis. Laboratory data would be complementary.  4. Hiatal hernia.  5. Diverticulosis.  6. Appendix not visualized or assessed.          Signer Name: Yanick Silva MD   Signed: 3/8/2021 10:09 PM   Workstation Name: [id number]    [facility]    CT Soft Tissue Neck Without Contrast [[id number]] Collected: 03/08/21 2204     Updated: 03/08/21 2206    Narrative:      CT Neck Soft Tissue WO    HISTORY:   Dysphasia    TECHNIQUE:   CT of the neck, soft tissues without IV contrast. Coronal and sagittal reconstructions were obtained. Radiation dose reduction techniques included automated exposure control or exposure modulation based on body size. Count of known CT and cardiac nuc med  studies performed in  previous 12 months: 0.     COMPARISON:   None available.    FINDINGS:   Patient is grossly rotated, and the exam is limited by motion. Lung apices are clear. There are secretions in the oropharynx. Pharyngeal and laryngeal structures appear unremarkable. No gross lymphadenopathy or lesions. Visualized intracranial structures  appear within normal limits. No acute osseous abnormality.      Impression:      No definite, pharyngeal, laryngeal or esophageal abnormality is definitively identified. No definite etiology for the patient's dysphagia is identified.      Signer Name: Tequila Maria MD   Signed: 3/8/2021 10:04 PM   Workstation Name: [id number]    [facility]    XR Chest 1 View [[id number]] Collected: 03/08/21 1911     Updated: 03/08/21 1913    Narrative:      EXAM:    XR Chest, 1 View     EXAM DATE:    3/8/2021 6:25 PM     CLINICAL HISTORY:    nausea/vomiting     TECHNIQUE:    Frontal view of the chest.     COMPARISON:    10/02/2019     FINDINGS:    Lungs:  Stable calcified granuloma right lung.    Pleural space:  Unremarkable.  No pneumothorax.    Heart:  Unremarkable.  No cardiomegaly.    Mediastinum:  Unremarkable.    Bones/joints:  Unremarkable.       Impression:        Stable chest. No acute cardiopulmonary findings.     This report was finalized on 3/8/2021 7:11 PM by Dr. Go Vann MD.           I have personally reviewed the above radiology images and read the final radiology report on 03/09/21  ---------------------------------------------------------------------------------------------------------------------  Assessment / Plan     Active Hospital Problems    Diagnosis  POA   • **Sepsis (CMS/[facility]) [A41.9]  Yes       ASSESSMENT/PLAN:  Severe Sepsis 2/2 CAP and ? Aspiration Pneumonia (HR 98, C-RP 3.61, Lactate 3.2 WBC 19.05)  COVID-19 positive   - CXR shows stable chest without acute cardiopulmonary findings.  - CT chest shows bibasilar consolidation/atelectasis with small  bilateral pleural effusions with possible aspiration pneumonia.   - CT soft tissue neck without definite pharyngeal, laryngeal, or esophageal abnormality.   - UA shows trace ketones, 3+ blood, positive nitrites, 1+ leukocytes, trace protein, 1+ bilirubin, 21-30 RBC, 6-12 WBC, trace bacteria, 3-6 squamous epithelium.   - COVID-19 positive; hold treatment for now due to patient not being hypoxic.   - Pending blood cultures x 2.   - Obtain respiratory panel.   - Received Zosyn in ED; continue with Doxycycline and Flagyl on admission.   - Placed in enhanced droplet/contact precautions.   - Encourage incentive spirometry.   - Continue to monitor VS per hospital protocol.   - Trend C-RP, lactate, and monitor WBC with AM CBC.     Atypical Chest Pain, present on admission  Possible Esophagitis   - CT soft tissue neck without definite pharyngeal, laryngeal, or esophageal abnormality.   - EKG shows normal sinus rhythm with nonspecific ST/T wave abnormality.   - Troponin T negative x 2.   - Received nystatin in ED; continue on admission.   - Continuous cardiac monitoring ordered.     EMILIA, present on admission  - Creatinine 1.01; baseline creatinine appears to be 0.6-0.7.   - Received 2 L fluid bolus in ED.   - Monitor response to fluids with repeat BMP.    Decompensated Cirrhosis with portal hypertension and large-volume Ascites  Small Bilateral Pleural Effusions  Multiple Liver Masses  Hypoalbuminemia  - Follows with Dr. Hercules outpatient.  - CT abdomen/pelvis shows cirrhosis with stigmata of portal hypertension and large volume ascites in abdomen and pelvis; masses in central liver and right hepatic lobe measuring 2.7 and 3.2 cm; lesion of left hepatic lobe measuring 10 mm; hepatocellular carcinoma could present similarly; gallbladder dilalated with cholelithasis without evidence of acute cholecystitis.  - AST 74, total bilirubin 2.4, and albumin 2.36.   - Obtain liver ultrasound.  - Possible paracentesis during  admission for large-volume ascites.   - Avoid hepatotoxins as much as possible.     Macrocytosis  - H/H stable.   - Obtain vitamin B12 and folate.   - Monitor with daily CBC.     Essential Hypertension  - BP stable.   - Continue to monitor per hospital protocol.   - Review home medications once reconciled.     Arthritis  - Supportive care.   ----------  -DVT prophylaxis: SCD's  -Activity: Up with assistance, fall precautions  -Expected length of stay: INPATIENT status due to the need for care which can only be reasonably provided in an hospital setting such as aggressive/expedited ancillary services and/or consultation services, the necessity for IV medications, close physician monitoring and/or the possible need for procedures.  In such, I feel patient's risk for adverse outcomes and need for care warrant INPATIENT evaluation and predict the patient's care encounter to likely last beyond 2 midnights.      High risk secondary to Severe sepsis 2/2 CAP and ? Aspiration Pneumonia, COVID-19 pneumonia    Renetta Goetz PA-C  03/09/21  02:10 EST    ---------------------------------------------------------------------------------------------------------------------

## 2021-03-09 NOTE — PROGRESS NOTES
Discharge Planning Assessment   Reji     Patient Name: Janeth Mitchell  MRN: [id number]  Today's Date: 3/9/2021    Admit Date: 3/8/2021    Discharge Needs Assessment     Row Name 03/09/21 1717       Living Environment    Lives With  alone    Unique Family Situation  Pt lives alone during the day but stays with daughterPayal at night.    Current Living Arrangements  home/apartment/condo    Primary Care Provided by  self;child(ronni)    Provides Primary Care For  no one    Family Caregiver if Needed  child(ronni), adult    Quality of Family Relationships  helpful;involved;supportive       Transition Planning    Patient/Family Anticipates Transition to  home with family    Transportation Anticipated  family or friend will provide       Discharge Needs Assessment    Equipment Currently Used at Home  walker, standard        Discharge Plan     Row Name 03/09/21 4566       Plan    Plan Pt was admitted on 03/08/21. SS received nursing consult for per nurse admission screen. SS spoke with pt on this date. Pt lives alone but at night stays with her daughterPayal. Pt does not utilize home health services. Pt has a walker for DME needs. Pt's PCP is Alexx Tafoya. Pt uses [facility]. Pt does not have a POA/living will. Pt plans on returning home at discharge, pt's daughter will provide transport. SS will follow.    Row Name 03/09/21 2877       Plan    Plan Comments  3/9:   admitted with sepsis 2/2 CAP & ? aspiration PNA; COVID +; decompensated cirrhosis with large volume ascites; 1 lpm 94%; Doxy, Flagyl IV; CRP 6.56; WBC 19.0; afeb; BCx pending        Leonor Pradhan

## 2021-03-09 NOTE — ED NOTES
Attempted to do a straight cath on pt and was unsuccessful at this time. Provider was made aware and stated to wait until the CT was completed.      Tierra Rainey, KENNETH  03/08/21 8775

## 2021-03-09 NOTE — PLAN OF CARE
Goal Outcome Evaluation:  Plan of Care Reviewed With: patient  Progress: no change  Outcome Summary: pt admitted this shift. covid isolation. will  monitor

## 2021-03-10 NOTE — CONSULTS
Janeth Mitchell  [id number]  1935  3/10/2021    Referring Provider:   Dr. Rivas Salazar    Reason for Consultation:   Hepatic masses    Patient Care Team:  Alexx Tafoya MD as PCP - General (Internal Medicine)    Chief Complaint:  Difficulty swallowing      History of Present Illness:    Janeth Mitchell is a 86 y.o. female with a past medical history significant for cirrhosis and currently follows with Dr. Hercules with gastroenterology who is being seen today at the request of Dr. Salazar for evaluation and treatment of hepatic masses.    She presented to [facility] ED with complaints of vomiting as well as dyspnea on exertion, neck and chest pain that had been occurring for th last several days, and dysphagia. She states that she has been unable to tolerate oral intake due to difficulty swallowing and pain. Ms. Mitchell also complains of generalized weakness. She was found to be COVID positive on admission. Due to her symptoms she also underwent imaging in the ED and CT chest significant for bibasilar consolidation with small bilateral pleural effusion and findings could be seen with aspiration pneumonia, also with a prominent hiatal hernia. CT A/P shows cirrhosis with portal hypertension, large volume ascites, and indeterminate liver but suspicious liver lesions that were similar to previous study.  Patient had an US of the abdomen in June 2020 that showed an incidental pancreatic head cyst but no liver lesions seen, and most recent CT in February did not show any evidence of pancreatic mass. , CEA drawn by primary team are normal. AFP is also normal  (5.2 ng/mL and one year ago was 4.77).     History and physical was obtained with the patient remotely with ipad and phone due to COVID 19 precautions.       Past Medical History:  Past Medical History:   Diagnosis Date   • Arthritis    • Cirrhosis (CMS/HCC)    • Gallstone pancreatitis 10/4/2019   • Hypertension    • IPMN (intraductal papillary  mucinous neoplasm) 9/11/2020   • Pancreatitis due to common bile duct stone    • Pseudocyst of pancreas 10/4/2019       Past Surgical History:  Past Surgical History:   Procedure Laterality Date   • CERVICAL POLYPECTOMY     • ERCP N/A 10/4/2019    Procedure: ENDOSCOPIC RETROGRADE CHOLANGIOPANCREATOGRAPHY;  Surgeon: Kranthi Hercules MD;  Location:  [facility];  Service: Gastroenterology   • ERCP N/A 10/16/2019    Procedure: ENDOSCOPIC RETROGRADE CHOLANGIOPANCREATOGRAPHY;  Surgeon: Kranthi Hercules MD;  Location:  [facility];  Service: Gastroenterology   • SKIN BIOPSY         Family History:  Family History   Problem Relation Age of Onset   • Diabetes Mother    • Cancer Mother    • Stroke Father    • Colon polyps Neg Hx    • Colon cancer Neg Hx        Social History:  Social History     Tobacco Use   • Smoking status: Never Smoker   • Smokeless tobacco: Never Used   Vaping Use   • Vaping Use: Never used   Substance Use Topics   • Alcohol use: No   • Drug use: No       Allergies:    Sulfa antibiotics and Sulfa antibiotics    Outpatient Medications:  Medications Prior to Admission   Medication Sig Dispense Refill Last Dose   • enalapril (VASOTEC) 20 MG tablet TAKE ONE TABLET BY MOUTH EVERY DAY FOR BLOOD PRESSURE   3/8/2021 at noon   • furosemide (LASIX) 20 MG tablet Take 20 mg by mouth Daily.   3/8/2021 at noon   • spironolactone (Aldactone) 50 MG tablet Take 1 tablet by mouth Daily. 30 tablet 6 3/8/2021 at noon       Inpatient Medications:  Scheduled Meds:  doxycycline, 100 mg, Intravenous, Q12H  enalapril, 20 mg, Oral, Daily  metroNIDAZOLE, 500 mg, Intravenous, Q8H  nystatin, 5 mL, Oral, 4x Daily  sodium chloride, 3 mL, Intravenous, Q12H        Continuous Infusions:       PRN Meds:  nitroglycerin  •  [COMPLETED] Insert peripheral IV **AND** sodium chloride  •  sodium chloride          Review of Systems:  A comprehensive 14 point review of systems was conducted with patient and  positive as per HPI otherwise negative.          Physical Exam:  Vital Signs: These were reviewed and listed as per patient’s electronic medical chart  Vitals:    03/10/21 0810   BP:    Pulse: 106   Resp:    Temp:    SpO2:      Constitutional: She appears well-developed and well-nourished. In in no acute distress  HENT:   Head: Normocephalic and atraumatic.   Right Ear: External ear normal.   Left Ear: External ear normal.   Nose: Nose normal. NC in place  Eyes: Pupils are equal, round. Conjunctivae and EOM are normal. No scleral icterus.  Neck: Neck normal appearance.  Pulmonary/Chest: Effort normal. Non-labored, no auditory wheezing.  Musculoskeletal: Normal range of motion.   Extremities: No obvious cyanosis or edema that can be seen via video.  Neurological: Alert and awake.  Psychiatric: Normal mood and affect.   Skin: chronic facial skin lesions          Labs / Studies:  Lab Results (last 72 hours)     Procedure Component Value Units Date/Time    Manual Differential [[id number]]  (Abnormal) Collected: 03/10/21 0742    Specimen: Blood Updated: 03/10/21 0835     Neutrophil % 79.0 %      Lymphocyte % 10.0 %      Monocyte % 10.0 %      Bands %  1.0 %      Neutrophils Absolute 10.69 10*3/mm3      Lymphocytes Absolute 1.34 10*3/mm3      Monocytes Absolute 1.34 10*3/mm3      Anisocytosis Mod/2+     Marta Cells Slight/1+     Polychromasia Slight/1+     Large Platelets Slight/1+    CBC & Differential [[id number]]  (Abnormal) Collected: 03/10/21 0742    Specimen: Blood Updated: 03/10/21 0835    Narrative:      The following orders were created for panel order CBC & Differential.  Procedure                               Abnormality         Status                     ---------                               -----------         ------                     Scan Slide[[id number]]                                                                  CBC Auto Differential[[id number]]        Abnormal            Final result                      Please view results for these tests on the individual orders.    CBC Auto Differential [[id number]]  (Abnormal) Collected: 03/10/21 0742    Specimen: Blood Updated: 03/10/21 0835     WBC 13.36 10*3/mm3      RBC 3.74 10*6/mm3      Hemoglobin 12.9 g/dL      Hematocrit 40.1 %      .2 fL      MCH 34.5 pg      MCHC 32.2 g/dL      RDW 19.7 %      RDW-SD 76.2 fl      MPV 10.3 fL      Platelets 173 10*3/mm3     Lactic Acid, Plasma [[id number]]  (Abnormal) Collected: 03/10/21 0742    Specimen: Blood Updated: 03/10/21 0831     Lactate 2.3 mmol/L     Comprehensive Metabolic Panel [[id number]]  (Abnormal) Collected: 03/10/21 0742    Specimen: Blood Updated: 03/10/21 0825     Glucose 108 mg/dL      BUN 50 mg/dL      Creatinine 1.53 mg/dL      Sodium 135 mmol/L      Potassium 5.0 mmol/L      Comment: Slight hemolysis detected by analyzer. Results may be affected.        Chloride 108 mmol/L      CO2 19.3 mmol/L      Calcium 9.0 mg/dL      Total Protein 5.2 g/dL      Albumin 1.73 g/dL      ALT (SGPT) 26 U/L      AST (SGOT) 62 U/L      Alkaline Phosphatase 111 U/L      Total Bilirubin 2.1 mg/dL      eGFR Non African Amer 32 mL/min/1.73      Globulin 3.5 gm/dL      A/G Ratio 0.5 g/dL      BUN/Creatinine Ratio 32.7     Anion Gap 7.7 mmol/L     Narrative:      GFR Normal >60  Chronic Kidney Disease <60  Kidney Failure <15      C-reactive Protein [[id number]]  (Abnormal) Collected: 03/10/21 0742    Specimen: Blood Updated: 03/10/21 0822     C-Reactive Protein 10.32 mg/dL     AFP Tumor Marker [[id number]]  (Normal) Collected: 03/09/21 1700    Specimen: Blood Updated: 03/10/21 0453     ALPHA-FETOPROTEIN 5.20 ng/mL     Narrative:      Alpha Fetoprotein Tumor Marker Reference Range:    0.0-8.3 ng/mL    Note: Normal values apply only to males and nonpregnant females. These results are not interpretable for pregnant females.    Results may be falsely decreased if patient taking Biotin.      Cancer Antigen 19-9 [[id number]]  (Normal)  Collected: 03/09/21 1700    Specimen: Blood Updated: 03/10/21 0451     CA 19-9 18.2 U/mL     Narrative:      Results may be falsely decreased if patient taking Biotin.     Blood Culture - Blood, Arm, Right [[id number]] Collected: 03/08/21 1930    Specimen: Blood from Arm, Right Updated: 03/09/21 2000     Blood Culture No growth at 24 hours    Blood Culture - Blood, Arm, Right [[id number]] Collected: 03/08/21 1938    Specimen: Blood from Arm, Right Updated: 03/09/21 2000     Blood Culture No growth at 24 hours    CEA [[id number]] Collected: 03/09/21 0537    Specimen: Blood Updated: 03/09/21 1621     CEA 5.70 ng/mL     Narrative:      CEA Reference Range:    Non Smokers:   Less than 3 ng/mL  Smokers:       Less than 5 ng/mL  Results may be falsely decreased if patient taking Biotin.      Folate [[id number]]  (Normal) Collected: 03/09/21 0537    Specimen: Blood Updated: 03/09/21 1213     Folate 5.73 ng/mL     Narrative:      Results may be falsely increased if patient taking Biotin.      Vitamin B12 [[id number]]  (Normal) Collected: 03/09/21 0537    Specimen: Blood Updated: 03/09/21 1212     Vitamin B-12 904 pg/mL     Narrative:      Results may be falsely increased if patient taking Biotin.      Respiratory Panel, PCR (WITHOUT COVID) - Swab, Nasopharynx [[id number]]  (Normal) Collected: 03/09/21 0829    Specimen: Swab from Nasopharynx Updated: 03/09/21 0943     ADENOVIRUS, PCR Not Detected     Coronavirus 229E Not Detected     Coronavirus HKU1 Not Detected     Coronavirus NL63 Not Detected     Coronavirus OC43 Not Detected     Human Metapneumovirus Not Detected     Human Rhinovirus/Enterovirus Not Detected     Influenza B PCR Not Detected     Parainfluenza Virus 1 Not Detected     Parainfluenza Virus 2 Not Detected     Parainfluenza Virus 3 Not Detected     Parainfluenza Virus 4 Not Detected     Bordetella pertussis pcr Not Detected     Chlamydophila pneumoniae PCR Not Detected     Mycoplasma pneumo by PCR Not Detected      Influenza A PCR Not Detected     RSV, PCR Not Detected     Bordetella parapertussis PCR Not Detected    Narrative:      The coronavirus on the RVP is NOT COVID-19 and is NOT indicative of infection with COVID-19.     Manual Differential [[id number]]  (Abnormal) Collected: 03/09/21 0537    Specimen: Blood Updated: 03/09/21 0803     Neutrophil % 82.0 %      Lymphocyte % 5.0 %      Monocyte % 3.0 %      Bands %  10.0 %      Neutrophils Absolute 15.34 10*3/mm3      Lymphocytes Absolute 0.83 10*3/mm3      Monocytes Absolute 0.50 10*3/mm3      Anisocytosis Mod/2+     Marta Cells Slight/1+     Macrocytes Mod/2+     Poikilocytes Slight/1+     Polychromasia Slight/1+     Target Cells Slight/1+     Platelet Morphology Normal    CBC & Differential [[id number]]  (Abnormal) Collected: 03/09/21 0537    Specimen: Blood Updated: 03/09/21 0803    Narrative:      The following orders were created for panel order CBC & Differential.  Procedure                               Abnormality         Status                     ---------                               -----------         ------                     Scan Slide[[id number]]                                       Final result               CBC Auto Differential[[id number]]        Abnormal            Final result                 Please view results for these tests on the individual orders.    Scan Slide [[id number]] Collected: 03/09/21 0537    Specimen: Blood Updated: 03/09/21 0803     Scan Slide --     Comment: See Manual Differential Results       Comprehensive Metabolic Panel [[id number]]  (Abnormal) Collected: 03/09/21 0537    Specimen: Blood Updated: 03/09/21 0642     Glucose 131 mg/dL      BUN 34 mg/dL      Creatinine 1.06 mg/dL      Sodium 136 mmol/L      Potassium 5.2 mmol/L      Comment: Specimen 1+ hemolyzed.  Results may be affected.        Chloride 105 mmol/L      CO2 22.7 mmol/L      Calcium 9.6 mg/dL      Total Protein 5.7 g/dL      Albumin 2.11 g/dL      ALT (SGPT) 27 U/L       Comment: Specimen 1+ hemolyzed.  Results may be affected.        AST (SGOT) 72 U/L      Alkaline Phosphatase 125 U/L      Total Bilirubin 2.2 mg/dL      Comment: 1+ Icteric         eGFR Non African Amer 49 mL/min/1.73      Globulin 3.6 gm/dL      A/G Ratio 0.6 g/dL      BUN/Creatinine Ratio 32.1     Anion Gap 8.3 mmol/L     Narrative:      GFR Normal >60  Chronic Kidney Disease <60  Kidney Failure <15      C-reactive Protein [[id number]]  (Abnormal) Collected: 03/09/21 0537    Specimen: Blood Updated: 03/09/21 0636     C-Reactive Protein 6.56 mg/dL     Lactic Acid, Plasma [[id number]]  (Abnormal) Collected: 03/09/21 0537    Specimen: Blood Updated: 03/09/21 0632     Lactate 3.2 mmol/L     CBC Auto Differential [[id number]]  (Abnormal) Collected: 03/09/21 0537    Specimen: Blood Updated: 03/09/21 0618     WBC 16.67 10*3/mm3      RBC 3.83 10*6/mm3      Hemoglobin 13.0 g/dL      Hematocrit 39.6 %      .4 fL      MCH 33.9 pg      MCHC 32.8 g/dL      RDW 19.6 %      RDW-SD 72.8 fl      MPV 9.9 fL      Platelets 175 10*3/mm3     COVID-19 and FLU A/B PCR - Swab, Nasopharynx [[id number]]  (Abnormal) Collected: 03/08/21 2252    Specimen: Swab from Nasopharynx Updated: 03/08/21 2336     COVID19 Detected     Influenza A PCR Not Detected     Influenza B PCR Not Detected    Narrative:      Fact sheet for providers: https://www.fda.gov/media/657548/download    Fact sheet for patients: https://www.fda.gov/media/911875/download    Test performed by PCR.  Influenza A and Influenza B negative results should be considered presumptive in samples that have a positive SARS-CoV-2 result.    Competitive inhibition studies showed that SARS-CoV-2 virus, when present at concentrations above 3.6E+04 copies/mL, can inhibit the detection and amplification of influenza A and influenza B virus RNA if present at or below 1.8E+02 copies/mL or 4.9E+02 copies/mL, respectively, and may lead to false negative influenza virus results. If co-infection  with influenza A or influenza B virus is suspected in samples with a positive SARS-CoV-2 result, the sample should be re-tested with another FDA cleared, approved, or authorized influenza test, if influenza virus detection would change clinical management.    Timed Lactic Acid, Reflex [[id number]]  (Abnormal) Collected: 03/08/21 2252    Specimen: Blood from Hand, Right Updated: 03/08/21 2329     Lactate 3.1 mmol/L     Urinalysis, Microscopic Only - Urine, Clean Catch [[id number]]  (Abnormal) Collected: 03/08/21 2238    Specimen: Urine, Clean Catch Updated: 03/08/21 2310     RBC, UA 21-30 /HPF      WBC, UA 6-12 /HPF      Bacteria, UA Trace /HPF      Squamous Epithelial Cells, UA 3-6 /HPF      Hyaline Casts, UA 0-2 /LPF      Methodology Manual Light Microscopy    Urinalysis With Microscopic If Indicated (No Culture) - Urine, Clean Catch [[id number]]  (Abnormal) Collected: 03/08/21 2238    Specimen: Urine, Clean Catch Updated: 03/08/21 2253     Color, UA Dark Yellow     Appearance, UA Cloudy     pH, UA <=5.0     Specific Gravity, UA 1.024     Glucose, UA Negative     Ketones, UA Trace     Bilirubin, UA Small (1+)     Blood, UA Large (3+)     Protein, UA Trace     Leuk Esterase, UA Small (1+)     Nitrite, UA Positive     Urobilinogen, UA 1.0 E.U./dL    Troponin [[id number]]  (Normal) Collected: 03/08/21 2101    Specimen: Blood from Arm, Left Updated: 03/08/21 2128     Troponin T <0.010 ng/mL     Narrative:      Troponin T Reference Range:  <= 0.03 ng/mL-   Negative for AMI  >0.03 ng/mL-     Abnormal for myocardial necrosis.  Clinicians would have to utilize clinical acumen, EKG, Troponin and serial changes to determine if it is an Acute Myocardial Infarction or myocardial injury due to an underlying chronic condition.       Results may be falsely decreased if patient taking Biotin.      Lactic Acid, Plasma [[id number]]  (Abnormal) Collected: 03/08/21 1942    Specimen: Blood from Arm, Right Updated: 03/08/21 2029     Lactate  3.2 mmol/L     [place] Draw [[id number]] Collected: 03/08/21 1829    Specimen: Blood Updated: 03/08/21 1930    Narrative:      The following orders were created for panel order [place] Draw.  Procedure                               Abnormality         Status                     ---------                               -----------         ------                     Light Blue Top[[id number]]                                   Final result               Green Top (Gel)[[id number]]                                  Final result               Lavender Top[[id number]]                                     Final result               Gold Top - SST[[id number]]                                   Final result                 Please view results for these tests on the individual orders.    Light Blue Top [[id number]] Collected: 03/08/21 1829    Specimen: Blood Updated: 03/08/21 1930     Extra Tube hold for add-on     Comment: Auto resulted       Lavender Top [[id number]] Collected: 03/08/21 1829    Specimen: Blood Updated: 03/08/21 1930     Extra Tube hold for add-on     Comment: Auto resulted       Gold Top - SST [[id number]] Collected: 03/08/21 1829    Specimen: Blood Updated: 03/08/21 1930     Extra Tube Hold for add-ons.     Comment: Auto resulted.       Green Top (Gel) [[id number]] Collected: 03/08/21 1829    Specimen: Blood Updated: 03/08/21 1930     Extra Tube Hold for add-ons.     Comment: Auto resulted.       Comprehensive Metabolic Panel [[id number]]  (Abnormal) Collected: 03/08/21 1829    Specimen: Blood Updated: 03/08/21 1858     Glucose 140 mg/dL      BUN 27 mg/dL      Creatinine 1.01 mg/dL      Sodium 136 mmol/L      Potassium 5.2 mmol/L      Comment: Slight hemolysis detected by analyzer. Results may be affected.        Chloride 105 mmol/L      CO2 20.6 mmol/L      Calcium 9.9 mg/dL      Total Protein 6.1 g/dL      Albumin 2.36 g/dL      ALT (SGPT) 28 U/L      AST (SGOT) 74 U/L      Alkaline Phosphatase 148 U/L      Total  Bilirubin 2.4 mg/dL      eGFR Non African Amer 52 mL/min/1.73      Globulin 3.7 gm/dL      A/G Ratio 0.6 g/dL      BUN/Creatinine Ratio 26.7     Anion Gap 10.4 mmol/L     Narrative:      GFR Normal >60  Chronic Kidney Disease <60  Kidney Failure <15      C-reactive Protein [[id number]]  (Abnormal) Collected: 03/08/21 1829    Specimen: Blood Updated: 03/08/21 1858     C-Reactive Protein 3.61 mg/dL     Lipase [[id number]]  (Normal) Collected: 03/08/21 1829    Specimen: Blood Updated: 03/08/21 1858     Lipase 16 U/L     Troponin [[id number]]  (Normal) Collected: 03/08/21 1829    Specimen: Blood Updated: 03/08/21 1858     Troponin T <0.010 ng/mL     Narrative:      Troponin T Reference Range:  <= 0.03 ng/mL-   Negative for AMI  >0.03 ng/mL-     Abnormal for myocardial necrosis.  Clinicians would have to utilize clinical acumen, EKG, Troponin and serial changes to determine if it is an Acute Myocardial Infarction or myocardial injury due to an underlying chronic condition.       Results may be falsely decreased if patient taking Biotin.      Protime-INR [[id number]]  (Abnormal) Collected: 03/08/21 1829    Specimen: Blood Updated: 03/08/21 1846     Protime 19.1 Seconds      Comment: Note new Reference Range        INR 1.62    Narrative:      Suggested INR therapeutic range for stable oral anticoagulant therapy:    Low Intensity therapy:   1.5-2.0  Moderate Intensity therapy:   2.0-3.0  High Intensity therapy:   2.5-4.0    aPTT [[id number]]  (Normal) Collected: 03/08/21 1829    Specimen: Blood Updated: 03/08/21 1846     PTT 34.4 seconds      Comment: Note new Reference Range       Narrative:      PTT Heparin Therapeutic Range:  59 - 95 seconds      Blood Gas, Arterial With Co-Ox [[id number]]  (Abnormal) Collected: 03/08/21 1838    Specimen: Arterial Blood Updated: 03/08/21 1839     Site Right Brachial     Crow's Test N/A     pH, Arterial 7.475 pH units      Comment: 83 Value above reference range        pCO2, Arterial 31.5 mm  Hg      pO2, Arterial 73.3 mm Hg      Comment: 84 Value below reference range        HCO3, Arterial 23.2 mmol/L      Base Excess, Arterial 0.3 mmol/L      O2 Saturation, Arterial 95.9 %      Hemoglobin, Blood Gas 13.4 g/dL      Comment: 84 Value below reference range        Hematocrit, Blood Gas 40.9 %      Oxyhemoglobin 94.7 %      Methemoglobin 0.00 %      Carboxyhemoglobin 1.2 %      A-a Gradiant 36.0 mmHg      CO2 Content 24.1 mmol/L      Temperature 0.0 C      Barometric Pressure for Blood Gas 737 mmHg      Modality Room Air     FIO2 21 %      Ventilator Mode NA     Note --     Collected by [id number]     Comment: Meter: [id number]     :  [id number]        pH, Temp Corrected --     pCO2, Temperature Corrected --     pO2, Temperature Corrected --    CBC & Differential [[id number]]  (Abnormal) Collected: 03/08/21 1829    Specimen: Blood Updated: 03/08/21 1838    Narrative:      The following orders were created for panel order CBC & Differential.  Procedure                               Abnormality         Status                     ---------                               -----------         ------                     CBC Auto Differential[[id number]]        Abnormal            Final result                 Please view results for these tests on the individual orders.    CBC Auto Differential [[id number]]  (Abnormal) Collected: 03/08/21 1829    Specimen: Blood Updated: 03/08/21 1838     WBC 19.05 10*3/mm3      RBC 4.06 10*6/mm3      Hemoglobin 14.0 g/dL      Hematocrit 40.8 %      .5 fL      MCH 34.5 pg      MCHC 34.3 g/dL      RDW 18.8 %      RDW-SD 67.2 fl      MPV 10.0 fL      Platelets 212 10*3/mm3      Neutrophil % 84.5 %      Lymphocyte % 4.6 %      Monocyte % 9.6 %      Eosinophil % 0.5 %      Basophil % 0.2 %      Immature Grans % 0.6 %      Neutrophils, Absolute 16.09 10*3/mm3      Lymphocytes, Absolute 0.88 10*3/mm3      Monocytes, Absolute 1.83 10*3/mm3      Eosinophils, Absolute 0.09  10*3/mm3      Basophils, Absolute 0.04 10*3/mm3      Immature Grans, Absolute 0.12 10*3/mm3      nRBC 0.0 /100 WBC             Imaging Results (Last 72 Hours)     Procedure Component Value Units Date/Time     Liver [[id number]] Resulted: 03/09/21 2107     Updated: 03/09/21 2108    CT Chest Without Contrast Diagnostic [[id number]] Collected: 03/08/21 2217     Updated: 03/08/21 2219    Narrative:      CT Chest WO    INDICATION:   Chest pain and shortness of breath    TECHNIQUE:   CT of the thorax without IV contrast. Coronal and sagittal reconstructions were obtained.  Radiation dose reduction techniques included automated exposure control or exposure modulation based on body size. Count of known CT and cardiac nuc med studies  performed in previous 12 months: 0.     COMPARISON:   None available.    FINDINGS:  Granuloma is seen in the right midlung field. There are bilateral pleural effusions with bibasilar consolidation/atelectasis. There is hiatal hernia with fluid. It is uncertain if this may be paraesophageal. There is ascites with cirrhosis and  gallstones. No acute osseous abnormality.      Impression:      1. There is bibasilar consolidation/atelectasis with small bilateral pleural effusions. There may be potential underlying infection; Findings may also be seen with aspiration pneumonia.  2. There is a prominent hiatal hernia containing fluid. It is uncertain if this hernia is paraesophageal.  3. There is ascites with cirrhosis and gallstones.    Signer Name: Tequila Maria MD   Signed: 3/8/2021 10:17 PM   Workstation Name: [id number]    [facility]    CT Abdomen Pelvis Without Contrast [[id number]] Collected: 03/08/21 2209     Updated: 03/08/21 2211    Narrative:      CT Abdomen Pelvis WO    INDICATION:   86-year-old female with abdominal pain nausea vomiting and history of pancreatitis and cirrhosis    TECHNIQUE:   CT of the abdomen and pelvis without IV contrast. Coronal and sagittal  reconstructions were obtained.  Radiation dose reduction techniques included automated exposure control or exposure modulation based on body size. Count of known CT and cardiac nuc  med studies performed in previous 12 months: 1.     COMPARISON:   2/5/2021    FINDINGS:  Abdomen: Small bilateral pleural effusions and probable compressive atelectasis or less likely. Large hiatal hernia and partial intrathoracic stomach. Aorta demonstrates advanced atherosclerotic change but there is no aneurysm. The and adrenal glands are  negative pancreas is unremarkable. Kidneys are nonobstructed. The liver is cirrhotic. Although limited by noncontrast technique there are probable masses in the dome of the central liver and right hepatic lobe measuring up to 2.7 cm and 3.2 cm  respectively. Indeterminate low-attenuation lesion left hepatic lobe measuring 10 mm. Malignancy could present similarly. The gallbladder is dilated and there is cholelithiasis. Large volume of ascites. There are gastrohepatic and probable small  esophageal varices. Incidental duodenal diverticulum, stable.    Pelvis: Large volume of ascites pelvis. The bladder is unremarkable. No drainable fluid collection in the pelvis or adnexal mass. Diverticulosis. Appendix not identified but no secondary signs sightedness. Inguinal canals are negative. At least moderate  anasarca. No suspicious bone lesion.      Impression:        1. Cirrhosis with stigmata of portal hypertension. Large volume of ascites in the abdomen and pelvis.  2. Indeterminate, but suspicious masses within the liver. Hepatocellular carcinoma could present similarly. Similar findings on prior study 2/25/2021. Correlation with alpha-fetoprotein levels recommended.  3. The gallbladder is dilated and there is cholelithiasis. No distinct evidence of acute cholecystitis. Laboratory data would be complementary.  4. Hiatal hernia.  5. Diverticulosis.  6. Appendix not visualized or assessed.          Zahraer  Name: Yanick Silva MD   Signed: 3/8/2021 10:09 PM   Workstation Name: [id number]    [facility]    CT Soft Tissue Neck Without Contrast [[id number]] Collected: 03/08/21 2204     Updated: 03/08/21 2206    Narrative:      CT Neck Soft Tissue WO    HISTORY:   Dysphasia    TECHNIQUE:   CT of the neck, soft tissues without IV contrast. Coronal and sagittal reconstructions were obtained. Radiation dose reduction techniques included automated exposure control or exposure modulation based on body size. Count of known CT and cardiac nuc med  studies performed in previous 12 months: 0.     COMPARISON:   None available.    FINDINGS:   Patient is grossly rotated, and the exam is limited by motion. Lung apices are clear. There are secretions in the oropharynx. Pharyngeal and laryngeal structures appear unremarkable. No gross lymphadenopathy or lesions. Visualized intracranial structures  appear within normal limits. No acute osseous abnormality.      Impression:      No definite, pharyngeal, laryngeal or esophageal abnormality is definitively identified. No definite etiology for the patient's dysphagia is identified.      Signer Name: Tequila Maria MD   Signed: 3/8/2021 10:04 PM   Workstation Name: [id number]    [facility]    XR Chest 1 View [[id number]] Collected: 03/08/21 1911     Updated: 03/08/21 1913    Narrative:      EXAM:    XR Chest, 1 View     EXAM DATE:    3/8/2021 6:25 PM     CLINICAL HISTORY:    nausea/vomiting     TECHNIQUE:    Frontal view of the chest.     COMPARISON:    10/02/2019     FINDINGS:    Lungs:  Stable calcified granuloma right lung.    Pleural space:  Unremarkable.  No pneumothorax.    Heart:  Unremarkable.  No cardiomegaly.    Mediastinum:  Unremarkable.    Bones/joints:  Unremarkable.       Impression:        Stable chest. No acute cardiopulmonary findings.     This report was finalized on 3/8/2021 7:11 PM by Dr. Go Vann MD.                                Assessment & Plan:  Janeth Mitchell is a 86 y.o. female with a past medical history significant for cirrhosis and currently follows with Dr. Hercules with gastroenterology who is being seen today at the request of Dr. Salazar for evaluation and treatment of hepatic masses.    Liver Masses  - I discussed her imaging findings with Dr. Vann and it is possible the lesions that are being seen are regenerative nodules versus a multifocal hepatocellular carcinoma however AFP is normal. Therefore I would recommend obtaining MRI abdomen for further evaluation. If this appears to be consistent with HCC she will need multidisciplinary evaluation and should continue to follow with her hepatologist for further management.   - Would not biopsy lesion without discussing with hepatology first as it would be high risk for bleeding.     COVID 19  - Management as per primary team, currently requiring supplemental oxygen.     Cirrhosis  - Currently follows with Dr. Hercules with gastroenterology.    I discussed her case with Dr. Salazar and Dr. Vann. Thank you for asking us to participate in Ms. Mitchell's care. Will sign off as she will need follow up with hepatology for further management. However, please do not hesitate to call should any questions or concerns arise or if we can be of further assistance.       Nina De La Fuente MD  03/10/21  09:22 EST

## 2021-03-10 NOTE — PROGRESS NOTES
Nutrition Services    Patient Name:  Janeth Mitcehll  YOB: 1935  MRN: [id number]  Admit Date:  3/8/2021    RD talked to pt on the phone attempted to obtain pt preferences to increase intake. Pt currently receiving magic cup BID, pt states she is able to eat some of it. Pt complains about throat pain, encouraged eating cold foods/ popsicles to help sooth.     Will cont to follow.    Electronically signed by:  Obdulia Trejo RD  03/10/21 13:06 EST

## 2021-03-10 NOTE — PLAN OF CARE
Goal Outcome Evaluation:     Progress: no change  Outcome Summary: Patient is currently resting in bed with no complaints at this time. New 20g IV inserted into the LFA. Pt tolerated well.

## 2021-03-10 NOTE — PROGRESS NOTES
[facility] HOSPITALIST PROGRESS NOTE     Patient Identification:  Name:  Janeth Mitchell  Age:  86 y.o.  Sex:  female  :  1935  MRN:  [id number]  Visit Number:  [id number]  Primary Care Provider:  Alexx Tfaoya MD    Length of stay:  2    Chief complaint: I want to go home    Subjective:    Patient seen via video conferencing equipment today.  Patient states she feels very poorly and wants to go home.  I did discuss inflammatory markers rising and need to stay for continued medical treatment.  Patient was adamant that she was going home and wants to leave AGAINST MEDICAL ADVICE.  I did call and talk to both of patient's daughters totaling greater than 1 hour and 30 minutes between the patient and her daughters.  Patient's daughter is coming to speak to the patient to further discuss goals of care.  ----------------------------------------------------------------------------------------------------------------------  Current Hospital Meds:  doxycycline, 100 mg, Intravenous, Q12H  enalapril, 20 mg, Oral, Daily  metroNIDAZOLE, 500 mg, Intravenous, Q8H  nystatin, 5 mL, Oral, 4x Daily  sodium chloride, 3 mL, Intravenous, Q12H         ----------------------------------------------------------------------------------------------------------------------  Vital Signs:  Temp:  [97.3 °F (36.3 °C)-98.3 °F (36.8 °C)] 98.3 °F (36.8 °C)  Heart Rate:  [106-118] 106  Resp:  [16] 16  BP: (113-139)/(45-69) 113/45      21  1810 21  0010   Weight: 81.6 kg (180 lb) 83.7 kg (184 lb 9.6 oz)     Body mass index is 36.05 kg/m².    Intake/Output Summary (Last 24 hours) at 3/10/2021 180  Last data filed at 3/10/2021 1745  Gross per 24 hour   Intake 562.69 ml   Output --   Net 562.69 ml     Diet Regular; Cardiac, Consistent Carbohydrate  ----------------------------------------------------------------------------------------------------------------------  Physical exam:  This physical exam has been  personally performed remotely in the unit aided by real-time audio/visual communication tools.  Patient's nurse present at bedside during this exam and assisted during exam. The use of a video visit has been reviewed with the patient and verbal informed consent has been obtained.     Physical Exam:  General: Patient appears awake, alert, and in no acute distress.  Head: Mild temporal wasting is noted, atraumatic  Eyes: EOMI. Conjunctivae and sclerae normal.  Ears: Ears appear intact with no abnormalities noted.   Neck: Trachea midline. No obvious JVD.  Lungs: Respirations appear to be regular, even and unlabored with no signs of respiratory distress. No audible wheezing.  Abdomen: No obvious abdominal distension.  MS: Muscle tone appears normal. No gross deformities.  Extremities: No clubbing, cyanosis or edema noted.  Skin: No visible bleeding, bruising, or rash.  Neurologic: Alert and oriented x3. No gross focal deficits.     ----------------------------------------------------------------------------------------------------------------------  Tele:    ----------------------------------------------------------------------------------------------------------------------  Results from last 7 days   Lab Units 03/08/21  2101 03/08/21  1829   TROPONIN T ng/mL <0.010 <0.010     Results from last 7 days   Lab Units 03/10/21  0742 03/09/21  0537 03/08/21 2252 03/08/21  1829   CRP mg/dL 10.32* 6.56*  --  3.61*   LACTATE mmol/L 2.3* 3.2* 3.1*  --    WBC 10*3/mm3 13.36* 16.67*  --  19.05*   HEMOGLOBIN g/dL 12.9 13.0  --  14.0   HEMATOCRIT % 40.1 39.6  --  40.8   MCV fL 107.2* 103.4*  --  100.5*   MCHC g/dL 32.2 32.8  --  34.3   PLATELETS 10*3/mm3 173 175  --  212   INR   --   --   --  1.62*     Results from last 7 days   Lab Units 03/08/21  1838   PH, ARTERIAL pH units 7.475*   PO2 ART mm Hg 73.3*   PCO2, ARTERIAL mm Hg 31.5*   HCO3 ART mmol/L 23.2     Results from last 7 days   Lab Units 03/10/21  0742 03/09/21  0537  03/08/21  1829   SODIUM mmol/L 135* 136 136   POTASSIUM mmol/L 5.0 5.2 5.2   CHLORIDE mmol/L 108* 105 105   CO2 mmol/L 19.3* 22.7 20.6*   BUN mg/dL 50* 34* 27*   CREATININE mg/dL 1.53* 1.06* 1.01*   EGFR IF NONAFRICN AM mL/min/1.73 32* 49* 52*   CALCIUM mg/dL 9.0 9.6 9.9   GLUCOSE mg/dL 108* 131* 140*   ALBUMIN g/dL 1.73* 2.11* 2.36*   BILIRUBIN mg/dL 2.1* 2.2* 2.4*   ALK PHOS U/L 111 125* 148*   AST (SGOT) U/L 62* 72* 74*   ALT (SGPT) U/L 26 27 28   Estimated Creatinine Clearance: 26.3 mL/min (A) (by C-G formula based on SCr of 1.53 mg/dL (H)).    No results found for: AMMONIA      No results found for: BLOODCX  No results found for: URINECX  No results found for: WOUNDCX  No results found for: STOOLCX    I have personally looked at the labs and they are summarized above.  ----------------------------------------------------------------------------------------------------------------------  Imaging Results (Last 24 Hours)     Procedure Component Value Units Date/Time    US Liver [[id number]] Collected: 03/10/21 1027     Updated: 03/10/21 1029    Narrative:      EXAMINATION: US LIVER-      CLINICAL INDICATION:Abnormal labs, cirrhosis; A41.9-Sepsis, unspecified  organism; R18.8-Other ascites; K76.9-Liver disease, unspecified;  Y39-Ikjexvf effusion, not elsewhere classified; J18.9-Pneumonia,  unspecified organism; R13.10-Dysphagia, unspecified     COMPARISON: CT 03/08/2021      TECHNIQUE: Sonographic imaging obtained in transverse and sagittal  planes of the liver. Color Doppler implemented.     FINDINGS:   Liver cirrhosis. Heterogeneous liver echotexture.  No focal liver lesion identified.  No intrahepatic biliary dilatation noted.  Liver is small in size.  Large volume ascites is noted.  Common bile duct is within normal limits and is:1.5 mm          Impression:      Advanced liver cirrhosis with portal hypertension changes including  ascites as detailed above.     This report was finalized on 3/10/2021 10:27 AM by  Dr. Go Vann MD.           ----------------------------------------------------------------------------------------------------------------------  Assessment and Plan:    1.  Severe sepsis -present on admission, continues to slowly improve.  Likely secondary to aspiration pneumonia.  We will continue doxycycline and Flagyl.    2.  Possible GI malignancy -CT of abdomen pelvis has evidence of liver lesions concerning for hepatocellular carcinoma.  However, AFP, CEA and CA 19 are all negative.  Patient is considered very high risk for liver biopsy.  Have discussed watchful waiting and possible repeat MRI of liver.  Patient's family currently discussing options.    3.  Suspected Candida esophagitis -continue nystatin swish and swallow    4.  Massive ascites -patient would benefit from paracentesis at this time.  However, patient is declining paracentesis.  Will await further discussions from family    5.  Acute kidney injury -serum creatinine mildly elevated compared to baseline.  Patient may be entering into hepatorenal syndrome.  We will continue to monitor closely.    6.  Suspect severe protein/calorie malnutrition -we will consult nutrition    7.  Essential hypertension -currently well controlled      Greater than 90 minutes were spent in direct contact with this patient discussing options for medical treatment as well as discussing options with multiple family members.      Rivas Salazar,    03/10/21   18:01 SILVINO Salazar,   03/10/21  18:01 EST

## 2021-03-10 NOTE — PROGRESS NOTES
Discharge Planning Assessment   Re[facility]     Patient Name: Janeth Mitchell  MRN: [id number]  Today's Date: 3/10/2021    Admit Date: 3/8/2021    Discharge Plan       Row Name 03/10/21 1234       Plan    Plan Pt lives alone but at night stays with her daughter, Payal. Pt plans on returning home at discharge, pt's daughter will provide transport. SS will follow.  (Pended)     Patient/Family in Agreement with Plan  yes  (Pended)           Frieda Freire

## 2021-03-10 NOTE — PLAN OF CARE
Goal Outcome Evaluation:  Plan of Care Reviewed With: patient     Outcome Summary: Pt has been resting in bed. Pt up to bedside. Pt complains of sore throat. Will continue to monitor.

## 2021-03-11 NOTE — PROGRESS NOTES
Discharge Planning Assessment   [facility]     Patient Name: Janeth Mitchell  MRN: [id number]  Today's Date: 3/11/2021    Admit Date: 3/8/2021      Discharge Plan     Row Name 03/11/21 0832       Plan    Final Discharge Disposition Code  07 - left AMA    Final Note Pt left AMA on 3/10/21. No needs identified.        MARTHA Stoddard

## 2021-03-11 NOTE — NURSING NOTE
Pt has been wanting to leave all shift. Pt alert and oriented.  Dr. Salazar had lengthy conversation with pt and explained to pt the risks of leaving and gave pt some options to make her more comfortable but pt refused everything. Pt states she just wanted to go home. Dr. Salazar also called and explained risks to daughters. Daughter Tanja then called me and stated that she is not able to talk pt into staying and that she was on her way to pick her up. Pt again saying that she was signing out AMA. Dr. Salazar notified.

## 2021-03-12 PROBLEM — J96.00 ACUTE RESPIRATORY FAILURE DUE TO COVID-19 (HCC): Status: ACTIVE | Noted: 2021-01-01

## 2021-03-12 PROBLEM — U07.1 ACUTE RESPIRATORY FAILURE DUE TO COVID-19 (HCC): Status: ACTIVE | Noted: 2021-01-01

## 2021-03-12 NOTE — NURSING NOTE
Attempted an IV 4 times, was unsuccessful, Dariusz WASHINGTON notified, new orders noted at this time.

## 2021-03-12 NOTE — PROGRESS NOTES
Discharge Planning Assessment  [facility]     Patient Name: Janeth Mitchell  MRN: [id number]  Today's Date: 3/12/2021    Admit Date: 3/12/2021    Discharge Needs Assessment     Row Name 03/12/21 1753       Living Environment    Lives With  alone    Current Living Arrangements  home/apartment/condo    Primary Care Provided by  self;child(ronni)    Quality of Family Relationships  helpful;involved;supportive    Able to Return to Prior Arrangements  yes       Transition Planning    Patient/Family Anticipates Transition to  home with family    Patient/Family Anticipated Services at Transition  none    Transportation Anticipated  family or friend will provide       Discharge Needs Assessment    Readmission Within the Last 30 Days  previous discharge plan unsuccessful    Equipment Currently Used at Home  walker, standard    Concerns to be Addressed  discharge planning    Anticipated Changes Related to Illness  none    Equipment Needed After Discharge  none        Discharge Plan     Row Name 03/12/21 2053       Plan    Plan  Pt lives at home alone and her daughter stays at night with her, plans to return home upon discharge family will provide transportation. Pcp is Dr Tafoya, she uses [facility] and has Medicare a +b. Pt does not have advanced directives on file. Pt uses a walker , does not have home o2 or home health. Pt left hospital AMA on  3/10/21.        Continued Care and Services - Admitted Since 3/12/2021    Coordination has not been started for this encounter.         Demographic Summary     Row Name 03/12/21 1752       General Information    Admission Type  inpatient    Arrived From  home    Referral Source  emergency department    Reason for Consult  discharge planning    Preferred Language  English        Functional Status     Row Name 03/12/21 1752       Functional Status    Usual Activity Tolerance  fair    Current Activity Tolerance  fair        Psychosocial    No documentation.       Abuse/Neglect     No documentation.       Legal    No documentation.       Substance Abuse    No documentation.       Patient Forms    No documentation.           Karla Olivas RN

## 2021-03-12 NOTE — ED PROVIDER NOTES
Subjective     Shortness of Breath  Severity:  Moderate  Onset quality:  Gradual  Timing:  Intermittent  Progression:  Waxing and waning  Chronicity:  Recurrent  Context: activity and URI    Relieved by:  Nothing  Worsened by:  Exertion  Ineffective treatments:  None tried  Associated symptoms: cough and wheezing    Associated symptoms: no chest pain, no claudication, no diaphoresis, no headaches and no PND    Risk factors comment:  Elderly/HTN      Review of Systems   Constitutional: Negative.  Negative for diaphoresis.   HENT: Negative.    Eyes: Negative.    Respiratory: Positive for cough, shortness of breath and wheezing.    Cardiovascular: Negative.  Negative for chest pain, claudication and PND.   Gastrointestinal: Negative.    Endocrine: Negative.    Genitourinary: Negative.    Musculoskeletal: Negative.    Skin: Negative.    Allergic/Immunologic: Negative.    Neurological: Negative.  Negative for headaches.   Hematological: Negative.    Psychiatric/Behavioral: Negative.        Past Medical History:   Diagnosis Date   • Arthritis    • Cirrhosis (CMS/HCC)    • Gallstone pancreatitis 10/4/2019   • Hypertension    • IPMN (intraductal papillary mucinous neoplasm) 9/11/2020   • Pancreatitis due to common bile duct stone    • Pseudocyst of pancreas 10/4/2019       Allergies   Allergen Reactions   • Sulfa Antibiotics Rash     States she cant remember   • Sulfa Antibiotics Rash     Pt unsure but states she is allergic       Past Surgical History:   Procedure Laterality Date   • CERVICAL POLYPECTOMY     • ERCP N/A 10/4/2019    Procedure: ENDOSCOPIC RETROGRADE CHOLANGIOPANCREATOGRAPHY;  Surgeon: Kranthi Hercules MD;  Location:  [facility];  Service: Gastroenterology   • ERCP N/A 10/16/2019    Procedure: ENDOSCOPIC RETROGRADE CHOLANGIOPANCREATOGRAPHY;  Surgeon: Kranthi Hercules MD;  Location:  [facility];  Service: Gastroenterology   • SKIN BIOPSY         Family History   Problem  Relation Age of Onset   • Diabetes Mother    • Cancer Mother    • Stroke Father    • Colon polyps Neg Hx    • Colon cancer Neg Hx        Social History     Socioeconomic History   • Marital status:      Spouse name: Not on file   • Number of children: Not on file   • Years of education: Not on file   • Highest education level: Not on file   Tobacco Use   • Smoking status: Never Smoker   • Smokeless tobacco: Never Used   Vaping Use   • Vaping Use: Never used   Substance and Sexual Activity   • Alcohol use: No   • Drug use: No   • Sexual activity: Defer           Objective   Physical Exam  Vitals and nursing note reviewed.   Constitutional:       Appearance: She is well-developed.   HENT:      Head: Normocephalic.      Right Ear: External ear normal.      Left Ear: External ear normal.   Eyes:      Conjunctiva/sclera: Conjunctivae normal.      Pupils: Pupils are equal, round, and reactive to light.   Cardiovascular:      Rate and Rhythm: Normal rate and regular rhythm.      Heart sounds: Normal heart sounds.   Pulmonary:      Effort: Pulmonary effort is normal.      Breath sounds: Decreased breath sounds and wheezing present.   Abdominal:      General: Bowel sounds are normal.      Palpations: Abdomen is soft.   Musculoskeletal:         General: Normal range of motion.      Cervical back: Normal range of motion and neck supple.   Skin:     General: Skin is warm and dry.      Capillary Refill: Capillary refill takes less than 2 seconds.   Neurological:      Mental Status: She is alert and oriented to person, place, and time.   Psychiatric:         Behavior: Behavior normal.         Thought Content: Thought content normal.         Procedures           ED Course  ED Course as of Mar 13 2121   Fri Mar 12, 2021   1148 EKG is sinus tachycardia with a rate of 101 bpm possible age-indeterminate anterior infarct but may be artifact OK interval is 146 ms QRS duration is 76 ms QT/QTc is 324/420 ms    [EG]   1200 Discussed  with Dr. Gray      [MAMADOU]      ED Course User Index  [EG] Alla Gray DO  [MAMADOU] Fredi Friend, APRN                                           [facility]    Final diagnoses:   EMILIA (acute kidney injury) (CMS/[facility])   Acute UTI   COVID-19   Sepsis, due to unspecified organism, unspecified whether acute organ dysfunction present (CMS/[facility])            Fredi Friend, APRN  03/13/21 2121

## 2021-03-12 NOTE — H&P
[facility] Medicine Services  HISTORY & PHYSICAL    Patient Identification:  Name:  Janeth Mitchell  Age:  86 y.o.  Sex:  female  :  1935  MRN:  [id number]   Visit Number:  [id number]  Admit Date: 3/12/2021   Primary Care Physician:  Alexx Tafoya MD     Subjective     Chief complaint:   Chief Complaint   Patient presents with   • Shortness of Breath     History of presenting illness:   Patient is an 86-year-old female with history of liver cirrhosis and possible hepatocellular carcinoma with multiple masses in her liver was recently admitted a few days ago due to Covid infection.  She was found to have an EMILIA, severely malnourished with low albumin and generalized fatigued with new onset ascites.  During hospitalization she was offered paracentesis which she declined.  She was given IV fluid and supportive treatment.  Oncology was consulted for concern for HCC.  However patient insisted on being discharged and wanted to leave AGAINST MEDICAL ADVICE.  After several discussions with family and spending over 2 hours overall with patient by the admitting provider patient decided to go home.  Less than 24 hours realized she was not doing well and had rapid decline at home with generalized weakness and decided to come back to the emergency room.  In the emergency room complaint of shortness of breath and was found to be hypoxic on room air with PO2 in the 50s.  She appears to be slightly confused and is unable to give detailed history.   ---------------------------------------------------------------------------------------------------------------------   Review of Systems : Patient is a very poor historian and does not participate in all history taking therefore unable to obtain detailed review of system  ---------------------------------------------------------------------------------------------------------------------   Past Medical History:   Diagnosis Date   • Arthritis       • Cirrhosis (CMS/HCC)    • Gallstone pancreatitis 10/4/2019   • Hypertension    • IPMN (intraductal papillary mucinous neoplasm) 9/11/2020   • Pancreatitis due to common bile duct stone    • Pseudocyst of pancreas 10/4/2019     Past Surgical History:   Procedure Laterality Date   • CERVICAL POLYPECTOMY     • ERCP N/A 10/4/2019    Procedure: ENDOSCOPIC RETROGRADE CHOLANGIOPANCREATOGRAPHY;  Surgeon: Kranthi Hercules MD;  Location:  [facility];  Service: Gastroenterology   • ERCP N/A 10/16/2019    Procedure: ENDOSCOPIC RETROGRADE CHOLANGIOPANCREATOGRAPHY;  Surgeon: Kranthi Hercules MD;  Location:  [facility];  Service: Gastroenterology   • SKIN BIOPSY       Family History   Problem Relation Age of Onset   • Diabetes Mother    • Cancer Mother    • Stroke Father    • Colon polyps Neg Hx    • Colon cancer Neg Hx      Social History     Socioeconomic History   • Marital status:      Spouse name: Not on file   • Number of children: Not on file   • Years of education: Not on file   • Highest education level: Not on file   Tobacco Use   • Smoking status: Never Smoker   • Smokeless tobacco: Never Used   Vaping Use   • Vaping Use: Never used   Substance and Sexual Activity   • Alcohol use: No   • Drug use: No   • Sexual activity: Defer     ---------------------------------------------------------------------------------------------------------------------   Allergies:  Sulfa antibiotics and Sulfa antibiotics  ---------------------------------------------------------------------------------------------------------------------   Medications below are reported home medications pulling from within the system; at this time, these medications have not been reconciled unless otherwise specified and are in the verification process for further verifcation as current home medications.    Prior to Admission Medications     Prescriptions Last Dose Informant Patient Reported? Taking?    albuterol  sulfate  (90 Base) MCG/ACT inhaler 3/12/2021 Child Yes Yes    Inhale 2 puffs Every 4 (Four) Hours As Needed for Wheezing.    aspirin 81 MG EC tablet 3/12/2021 Child Yes Yes    Take 81 mg by mouth Daily.    enalapril (VASOTEC) 20 MG tablet 3/12/2021 Child Yes Yes    TAKE ONE TABLET BY MOUTH EVERY DAY FOR BLOOD PRESSURE    furosemide (LASIX) 20 MG tablet 3/12/2021 Child Yes Yes    Take 20 mg by mouth Daily.    levoFLOXacin (LEVAQUIN) 250 MG tablet 3/12/2021 Child Yes Yes    Take 250 mg by mouth Daily. Prior to [facility], Patient was on: started 03/11/21 for 8 days    methylPREDNISolone (MEDROL) 4 MG dose pack Not started yet Child Yes Yes    Take 1 tablet by mouth Take As Directed. Prior to [facility] Admission, Patient was on: Take as directed on package instructions, taper dose. Patient hasn't started this yet    nystatin susp + lidocaine viscous (MAGIC MOUTHWASH) oral suspension 3/12/2021 Child Yes Yes    Swish and swallow 5 mL 4 (Four) Times a Day.    promethazine-dextromethorphan (PROMETHAZINE-DM) 6.25-15 MG/5ML solution 3/11/2021 Child Yes Yes    Take 5 mL by mouth Every 6 (Six) Hours As Needed for Cough. Prior to [facility] Admission, Patient was on: every 4 to 6 hours as needed        ---------------------------------------------------------------------------------------------------------------------    Objective     Hospital Scheduled Meds:  albumin human, 25 g, Intravenous, BID  cefTRIAXone, 1 g, Intravenous, Q24H  dexamethasone, 6 mg, Intravenous, Daily  doxycycline, 100 mg, Intravenous, Q12H  heparin (porcine), 5,000 Units, Subcutaneous, Q8H  lidocaine PF 1%, 5 mL, Injection, Once  sodium chloride, 1,000 mL, Intravenous, Once  sodium chloride, 1,000 mL, Intravenous, Once  sodium chloride, 10 mL, Intravenous, Q12H      sodium chloride, 100 mL/hr        Current listed hospital scheduled medications may not yet reflect those currently placed in orders that are signed and held, awaiting patient's  arrival to floor/unit.    ---------------------------------------------------------------------------------------------------------------------   Vital Signs:  Temp:  [97.6 °F (36.4 °C)-98.9 °F (37.2 °C)] (P) 97.6 °F (36.4 °C)  Heart Rate:  [] 100  Resp:  [12-21] (P) 12  BP: ()/(42-69) 101/52  No data found.  SpO2 Percentage    03/12/21 1607 03/12/21 1648 03/12/21 1748   SpO2: 100% 100% 100%     SpO2:  [98 %-100 %] 100 %  on  Flow (L/min):  [2] 2;   Device (Oxygen Therapy): (P) nasal cannula    Body mass index is 39.06 kg/m².  Wt Readings from Last 3 Encounters:   03/12/21 90.7 kg (200 lb)   03/09/21 83.7 kg (184 lb 9.6 oz)   01/23/20 76.4 kg (168 lb 6.4 oz)     ---------------------------------------------------------------------------------------------------------------------   Physical Exam:  GEN: Chronically ill-appearing, typical features of liver cirrhosis with cachexia and temporal wasting  EYES: Pupils are round and reactive to light, slight scleral icterus noted  HENT: Dry oral mucosa, no evidence of bleeding or laceration  CV: Regular rate and rhythm, pulses normal  PULM: Nonlabored breathing, no audible wheezing, poor respiratory effort  GI: Distended abdomen, soft, positive bowel sounds, positive fluid wave, nontender  NEURO: Awake but lethargic, oriented to person and place and partially situation, no acute focal neurologic deficit, very soft-spoken no slurred speech or facial droop  SKIN: Slightly icteric, 3+ pitting edema of lower extremities, no ulcerations of lower extremities  PSYCH: Awake but lethargic, slightly confused  ---------------------------------------------------------------------------------------------------------------------  EKG:    Sinus tachycardia, low voltage, no obvious evidence of ischemia    I have personally reviewed the EKG/Telemetry strip  ---------------------------------------------------------------------------------------------------------------------      Results from last 7 days   Lab Units 03/12/21  1426 03/08/21  2101 03/08/21  1829   TROPONIN T ng/mL <0.010  <0.010 <0.010 <0.010     Results from last 7 days   Lab Units 03/12/21  1426   PROBNP pg/mL 375.2       Results from last 7 days   Lab Units 03/12/21  1138   PH, ARTERIAL pH units 7.390   PO2 ART mm Hg 59.1*   PCO2, ARTERIAL mm Hg 35.0   HCO3 ART mmol/L 21.2     Results from last 7 days   Lab Units 03/12/21  1741 03/12/21  1426 03/10/21  0742 03/09/21  0537 03/08/21  1829   CRP mg/dL  --  6.69* 10.32* 6.56* 3.61*   LACTATE mmol/L 3.6* 3.3* 2.3* 3.2*  --    WBC 10*3/mm3  --  13.48* 13.36* 16.67* 19.05*   HEMOGLOBIN g/dL  --  11.9* 12.9 13.0 14.0   HEMATOCRIT %  --  34.8 40.1 39.6 40.8   MCV fL  --  100.6* 107.2* 103.4* 100.5*   MCHC g/dL  --  34.2 32.2 32.8 34.3   PLATELETS 10*3/mm3  --  186 173 175 212   INR   --   --   --   --  1.62*     Results from last 7 days   Lab Units 03/12/21  1426 03/10/21  0742 03/09/21  0537   SODIUM mmol/L 136 135* 136   POTASSIUM mmol/L 5.3* 5.0 5.2   CHLORIDE mmol/L 105 108* 105   CO2 mmol/L 19.9* 19.3* 22.7   BUN mg/dL 85* 50* 34*   CREATININE mg/dL 3.46* 1.53* 1.06*   EGFR IF NONAFRICN AM mL/min/1.73 13* 32* 49*   CALCIUM mg/dL 8.6 9.0 9.6   GLUCOSE mg/dL 90 108* 131*   ALBUMIN g/dL 1.77* 1.73* 2.11*   BILIRUBIN mg/dL 2.0* 2.1* 2.2*   ALK PHOS U/L 118* 111 125*   AST (SGOT) U/L 80* 62* 72*   ALT (SGPT) U/L 36* 26 27   Estimated Creatinine Clearance: 12.2 mL/min (A) (by C-G formula based on SCr of 3.46 mg/dL (H)).  No results found for: AMMONIA    No results found for: HGBA1C, POCGLU  No results found for: HGBA1C  No results found for: TSH, FREET4    Blood Culture   Date Value Ref Range Status   03/08/2021 No growth at 3 days  Preliminary   03/08/2021 No growth at 3 days  Preliminary     ---------------------------------------------------------------------------------------------------------------------  Imaging Results (Last 7 Days)     Procedure Component Value Units  Date/Time    NM Lung Scan Perfusion Particulate [[id number]] Collected: 03/12/21 1659     Updated: 03/12/21 1701    Narrative:      EXAMINATION: NM LUNG SCAN PERFUSION PARTICULATE-      CLINICAL INDICATION: positive dimer     TECHNIQUE:  3.6 mCi of Tc-99m MAA were administered IV for a perfusion  lung scan. Ventilation could not be performed.     COMPARISON: Chest CT performed on same day.     FINDINGS: Multiple perfusion images demonstrate a relatively homogeneous  pattern of pulmonary capillary tracer distribution throughout the lungs.   Specifically, there are no segmental or sub-segmental defects to  suggest pulmonary embolism.        Impression:      Low suspicion of PE.      This report was finalized on 3/12/2021 4:59 PM by Dr. Go Vann MD.       CT Abdomen Pelvis Without Contrast [[id number]] Collected: 03/12/21 1655     Updated: 03/12/21 1701    Narrative:      EXAM:    CT Abdomen and Pelvis Without Intravenous Contrast     EXAM DATE:    3/12/2021 4:02 PM     CLINICAL HISTORY:    acute renal failure     TECHNIQUE:    Axial computed tomography images of the abdomen and pelvis without  intravenous contrast.  Sagittal and coronal reformatted images were  created and reviewed.  This CT exam was performed using one or more of  the following dose reduction techniques:  automated exposure control,  adjustment of the mA and/or kV according to patient size, and/or use of  iterative reconstruction technique.     COMPARISON:    03/08/2021     FINDINGS:    Lung bases:  Bibasilar consolidative airspace disease.    Mediastinum:  Large hiatal hernia.      ABDOMEN:    Liver:  As described previously, there are 2 ovoid masses along the  dome of the liver is representing metastatic disease versus multifocal  hepatocellular carcinoma versus atypical regenerative nodules. One  lesion is 3.6 cm and the other lesion is 3.6 and meter. No change from  previous.  Severe liver cirrhosis.    Gallbladder and bile ducts:  Distended  gallbladder with wall  thickening and cholelithiasis.  No ductal dilation.    Pancreas:  Unremarkable.  No ductal dilation.    Spleen:  Spleen within normal limits.    Adrenals:  Unremarkable.  No mass.    Kidneys and ureters:  Unremarkable.  No obstructing stones.  No  hydronephrosis.    Stomach and bowel:  Sigmoid diverticulosis noted without evidence of  diverticulitis.  No bowel obstruction.      PELVIS:    Appendix:  The appendix is within normal limits.    Bladder:  Hernandez catheter decompresses urinary bladder.  No stones.    Reproductive:  Unremarkable as visualized.      ABDOMEN and PELVIS:    Intraperitoneal space:  Moderate to large volume ascites.  No free  air.    Bones/joints:  No acute fracture.  No dislocation.    Soft tissues:  Anasarca.  Asymmetric thickening and edema of the right  breast either due to positioning or intrinsic breast process.    Vasculature:  Esophageal varices.  Perigastric collateral vessels.  No  abdominal aortic aneurysm.    Lymph nodes:  Unremarkable.  No enlarged lymph nodes.       Impression:      1.  Severe liver cirrhosis and changes of portal hypertension with no  sniffing change in moderate to large volume ascites.  2.  2 solid-appearing lesions along the dome of the liver that are  unchanged from previous. Favor neoplastic etiology.   3.  Distended gallbladder with wall thickening and cholelithiasis.  Presumably related to underlying liver disease and portal hypertension.  4.  Diverticulosis without diverticulitis.  5.  Large hiatal hernia and esophageal varices.  6.  Asymmetric edema and skin thickening right breast. Possibly from  positioning but rule out intrinsic breast process such as malignancy.  7.  Bibasilar consolidative airspace disease and small effusions.  8.  Other nonacute findings as above.     This report was finalized on 3/12/2021 4:59 PM by Dr. Go Vann MD.       CT Chest Without Contrast Diagnostic [[id number]] Collected: 03/12/21 0475      Updated: 03/12/21 1659    Narrative:      EXAM:    CT Chest Without Intravenous Contrast     EXAM DATE:    3/12/2021 4:02 PM     CLINICAL HISTORY:    shortness of breath     TECHNIQUE:    Axial computed tomography images of the chest without intravenous  contrast.  Sagittal and coronal reformatted images were created and  reviewed.  This CT exam was performed using one or more of the following  dose reduction techniques:  automated exposure control, adjustment of  the mA and/or kV according to patient size, and/or use of iterative  reconstruction technique.     COMPARISON:    03/08/2021     FINDINGS:    Lungs:  Consolidative airspace disease with volume loss of the right  lower lobe which likely represents combination of atelectasis with  pneumonia.  Partially consolidative airspace disease left lower lobe  which may represent atelectasis or pneumonia.  Calcified granuloma right  upper lobe.    Pleural space:  Small left greater than right pleural effusions.  No  pneumothorax.    Heart:  Moderate cardiomegaly is noted.  Mild cardiomegaly with mild  coronary artery calcifications.  No significant pericardial effusion.    Mediastinum:  Large hiatal hernia with hiatal hernia also containing  fluid and herniated FAT.    Bones/joints:  Unremarkable.  No acute fracture.  No dislocation.    Soft tissues:  Asymmetric edema of the right breast with skin  thickening.    Vasculature:  Unremarkable.  No thoracic aortic aneurysm.    Lymph nodes:  Granulomatous changes right hilum with calcified nodes.    Liver:  Severe liver cirrhosis.    Gallbladder and bile ducts:  Distended gallbladder and cholelithiasis.    Intraperitoneal space:  Upper abdominal ascites.       Impression:      1.  Bibasilar partially consolidative airspace disease which may  represent combination of atelectasis with pneumonia.  2.  Small left greater than right nonloculated pleural effusions.  3.  Cardiomegaly.  4.  Severe liver cirrhosis and portal  hypertension changes.  5.  Asymmetric edema with skin thickening right breast. This may be  either due to positional changes or could represent underlying breast  malignancy.  6.  Large hiatal hernia with fluid and fat in addition to portion of  stomach.     This report was finalized on 3/12/2021 4:55 PM by Dr. Go Vann MD.       XR Chest AP [[id number]] Collected: 03/12/21 1242     Updated: 03/12/21 1244    Narrative:      EXAM:    XR Chest, 1 View     EXAM DATE:    3/12/2021 11:44 AM     CLINICAL HISTORY:    COVID Evaluation, Cough, Fever     TECHNIQUE:    Frontal view of the chest.     COMPARISON:    03/08/2021     FINDINGS:    Lungs:  Left linear basilar atelectasis.    Pleural space:  Unremarkable.  No pneumothorax.    Heart:  Unremarkable.  No cardiomegaly.    Mediastinum:  Unremarkable.    Bones/joints:  Unremarkable.       Impression:        Linear atelectasis left lung base. Otherwise stable unremarkable  chest.     This report was finalized on 3/12/2021 12:42 PM by Dr. Go Vann MD.         ---------------------------------------------------------------------------------------------------------------------    Assessment & Plan      -Severe sepsis-meets criteria with heart rate and leukocytosis and hypotension.  Lactic acid is likely unrelated to sepsis  -Acute hypoxic respiratory failure due to Covid infection  -Acute Covid pneumonia diagnosed 3/8/21  -Hypotension due to volume depletion versus early sepsis  -Possible bacterial coinfection with Covid pneumonia  -EMILIA concerning for hepatorenal syndrome versus ATN  -Uremia due to EMILIA  -Persistent lactic acidosis likely due to liver cirrhosis  -Transaminitis unchanged from prior admission   -liver nodules vs HCC  -Severe hypoalbuminemia likely due to cirrhosis and severe malnutrition  -Severe malnutrition  -Decompensated liver cirrhosis with ascites  -Possible HCC  -Elevated D-dimer due to Covid pneumonia +/- possible malignancy  -Coagulopathy  likely due to cirrhosis + malnutrition  -Adult failure to thrive due to advanced age and possible malignancy with acute Covid infection    Patient was admitted a few days ago for Covid pneumonia.  Had EMILIA with creatinine of 1.5 baseline being less than 1.  However insisted on going home.  She was not hypoxic at that time however on return to the emergency room today she was found to be hypoxic with PaO2 in the 50s therefore started on 2 L nasal cannula oxygen.  She was also found to be severely hypotensive with systolic blood pressure in the 80s and responded to 1 L IV fluid.  She is currently afebrile.  Heart rate is greater than 90's. Her lactic acid has been elevated since last admission without major change this is likely due to her advanced liver cirrhosis and possible HCC.  I am starting her on further IV fluid hydration due to EMILIA concerning for possible hepatorenal syndrome.  I am giving her 1 emperatriz/ KG albumin for the next 3 days.  Will obtain urine sodium.  Hernandez catheter was placed in the ER and will continue for accurate measurement of urine output as well as hemodynamics.  She did have leukocytosis of 19 during last admission which has improved to 13.5 today.  Bibasilar infiltrates noted on CT imaging that could be from atelectasis versus bacterial coinfection with Covid therefore started on Rocephin and doxycycline.  Patient received a dose of Zosyn and vancomycin in the ER.    I have spoken with daughter Payal Guillen and updated her on current findings and clinical status and treatment plan.    ---------------------------------------------------  DVT Prophylaxis: hep gtt per [facility] protocol for elevated DD       Code Status: Full        Disposition: admit to PCU. Poor prognosis. May not survive this hospitalization.     Katayoun Behbahani, MD  Hospitalist Service -- [facility]       03/12/21  18:56 EST

## 2021-03-12 NOTE — ED NOTES
ekg preformed by [facility] at 1145 given to Dr. Gray at 1146      [facility]  03/12/21 1150

## 2021-03-12 NOTE — ED NOTES
Attempted to stick pt x2 was unsuccessful, contacted lab to come stick pt.       Yajaira Sepulveda  03/12/21 2565

## 2021-03-13 NOTE — NURSING NOTE
Patient daughter Payal Guillen called to ask some questions regarding patient prognosis and goals of care. States she would like for her siblings to hear the information from the Dr. Cheatham to set up family conference with physician. Payal will call back with the time all of her siblings can get to the hospital for the meeting.

## 2021-03-13 NOTE — PROGRESS NOTES
[facility] Physician - Brief Progress Note  PERMANENT  03/13/2021 15:58    [employer]  [facility] - [facility] - [place] [place] ([facility])    BRITTANY FOSTERRIGO AGUILERA    Date of Service 03/13/2021 15:58    HPI/Events of Note RT Assessment Note    Hospital Course/Comorbidities:  85 yo F w/ hx of cirrhosis, possible HCCa w/ liver masses, recent COVID infection admitted a few days ago, but left AMA, but now readmitted w/ acute renal failure, sepsis, hypoxemia requiring 4LNC. Pt was admitted to PCU   and started on heparin gtt and treatment for COVID including decadron,remdesivir and heparin gtt for very elevated Ddimer.  RRT called for massive hematemesis and hypotension.  Several hours later RN noted change in LOC from pulling on lines to now only   arouses to pain. Pt  was intubated for airway protection and respiratory failure.     Hospital Day#:  1  ICU Day #:  1  Vent Day #:  1    Vitals:  hr 128, b/p 111/50, rr 26,  Spo2 92%  ABG 7.24/31.5/83.7/13.5/96.2% on AC 15/400/+5/40%  Current Settings:  AC 22/400/+5/40%  (MVe 9.5)  TV (ml/kg/IBW):  8.8   P/F:  209  Airway (Secretions, Leak Test):  none    Mental Status: no response  [place] Coma Scale #:  3  Sedation:  Versed 2 mg/hr,       Pressors: Epinephrine 0.26 mcg/kg/min, Levophed 0.3 mcg/kg/min, Neosynephrine 3  mcg/kg/min, Vasopressin 0.01 units/min  CXR:  tubes and supporting devieces appear in satisfactory position.  Cumulative I/O:  +7223 ml    [employer] RT Comments: decrease volumes for LPS,   [employer] MD Comments:  Orders Written by [employer]:Decrease TV to 300 and keep the increased rate.      Interventions Major-Respiratory failure - evaluation and management        Electronically Signed by: Abhilash Venegas) on 03/13/2021 16:38    Annotated By: Abhilash Venegas)    Date: 03/13/21 16:43  IVP Bicarb and change Dextrose IVF to dextrose with bicarb

## 2021-03-13 NOTE — ANESTHESIA PROCEDURE NOTES
Airway  Urgency: emergent    Date/Time: 3/13/2021 9:30 AM  End Time:3/13/2021 9:40 AM  Airway not difficult    General Information and Staff    Patient location during procedure: ICU  Anesthesiologist: Robert Irving DO    Consent for Airway (if performed for an anesthetic, see related documentation for consents)  Patient identity confirmed: arm band  Consent: The procedure was performed in an emergent situation. Verbal consent obtained. Written consent not obtained.  Risks and benefits: risks, benefits and alternatives were discussed  Consent given by: guardian      Indications and Patient Condition  Indications for airway management: airway protection and respiratory distress/failure    Preoxygenated: yes  MILS maintained throughout  Mask difficulty assessment: 0 - not attempted    Final Airway Details  Final airway type: endotracheal airway      Successful airway: ETT  Cuffed: yes   Successful intubation technique: direct laryngoscopy and video laryngoscopy  Facilitating devices/methods: cricoid pressure  Endotracheal tube insertion site: oral  Blade: Glidescope  Blade size: 3  ETT size (mm): 7.5  Cormack-Lehane Classification: grade I - full view of glottis  Placement verified by: chest auscultation   Measured from: lips  ETT/EBT  to lips (cm): 20  Number of attempts at approach: 1  Assessment: lips, teeth, and gum same as pre-op and atraumatic intubation

## 2021-03-13 NOTE — PROGRESS NOTES
Pharmacy to dose Zosyn for pneumonia/sepsis:  CrCl = 12.  Dosed as follows:  Zosyn 3.375gm iv ext infusion q12h.  Pharmacy will monitor and adjust dosing if needed.     Pete Jasso RPH  04:58 EST  03/13/21

## 2021-03-13 NOTE — PLAN OF CARE
Problem: Adult Inpatient Plan of Care  Goal: Plan of Care Review  Outcome: Ongoing, Not Progressing  Goal: Patient-Specific Goal (Individualized)  Outcome: Ongoing, Not Progressing  Goal: Absence of Hospital-Acquired Illness or Injury  Outcome: Ongoing, Not Progressing  Intervention: Identify and Manage Fall Risk  Recent Flowsheet Documentation  Taken 3/13/2021 0430 by Luis Camacho RN  Safety Promotion/Fall Prevention: safety round/check completed  Goal: Optimal Comfort and Wellbeing  Outcome: Ongoing, Not Progressing  Intervention: Provide Person-Centered Care  Recent Flowsheet Documentation  Taken 3/13/2021 0430 by Luis Camacho RN  Trust Relationship/Rapport:   care explained   choices provided   emotional support provided   empathic listening provided   questions answered   questions encouraged   reassurance provided   thoughts/feelings acknowledged  Goal: Readiness for Transition of Care  Outcome: Ongoing, Not Progressing     Problem: Fall Injury Risk  Goal: Absence of Fall and Fall-Related Injury  Outcome: Ongoing, Not Progressing  Intervention: Promote Injury-Free Environment  Recent Flowsheet Documentation  Taken 3/13/2021 0430 by Luis Camacho RN  Safety Promotion/Fall Prevention: safety round/check completed     Problem: Skin Injury Risk Increased  Goal: Skin Health and Integrity  Outcome: Ongoing, Not Progressing  Intervention: Optimize Skin Protection  Recent Flowsheet Documentation  Taken 3/13/2021 0430 by Luis Camacho RN  Pressure Reduction Techniques:   heels elevated off bed   positioned off wounds   pressure points protected  Pressure Reduction Devices: pressure-redistributing mattress utilized  Skin Protection:   tubing/devices free from skin contact   transparent dressing maintained   skin-to-skin areas padded   skin-to-device areas padded   incontinence pads utilized   adhesive use limited   Goal Outcome Evaluation:      Pt transferred from PCU for hypotension  on two pressors. Pt cont to be very unstable will cont to monitor closely for the remainder of the shift

## 2021-03-13 NOTE — PROCEDURES
Arterial line insertion using the ultrasound    Reason for the procedure-shock state    A time-out was completed verifying correct patient, procedure, site, positioning, and special equipment if applicable. Crow’s test was performed to ensure adequate perfusion. The patient’s left  wrist was prepped and draped in sterile fashion.     Ultrasound was used to identify the radial artery and ultrasound was used throughout the procedure.  Arrow arterial line was introduced into the left radial artery. The catheter was threaded over the guide wire and the needle was removed with appropriate pulsatile blood return. The catheter was then sutured in place to the skin and a sterile dressing applied. Perfusion to the extremity distal to the point of catheter insertion was checked and found to be adequate.    Estimated Blood Loss:  2-3 mL    Patient tolerated the procedure well and there were no complications.  Arterial waveform confirmed

## 2021-03-13 NOTE — PROGRESS NOTES
THC Physician - Brief Progress Note  PERMANENT  03/13/2021 04:29    [facility] [place] - CCU - [facility] - [place], [place] ([facility])    WINNIEFOSTER VAZQUEZ WILLY    Date of Service 03/13/2021 04:29    HPI/Events of Note [facility] Provider Assessment Note  87 yo F w/ hx of cirrhosis, possible HCCa w/ liver masses, recent COVID infection admitted a few days ago, but left AMA, but now readmitted w/ acute renal failure, sepsis, hypoxemia requiring 4L NC.  Pt was admitted to PCU and started on heparin gtt and   treatment for COVID including decadron, remdesivir and heparin gtt for very elevated Ddimer.     RRT was called on floor as pt had massive hematemesis and hypotension.   A CVC was placed and 2U PRBC were transfused.   NGT placed and ongoing bloody output noted.   started on octreotide gtt, Vit K X1, FFP 2U ordered.   Started on fluids, abx, vasopressor support.     Pt has received at least 4L of fluid.  now s/p 2U PRBC and 2FFP are being transfused.   PT is awake, alert and not indistress.   On 4L NC.   Current vasopressors:   2.5mcg/kg/min PE  0.26mcg/kg/min NE  0.04U/min Vaso  Hypothermic to 96    Labs:   7.197/32/74/12 on NC 4L  creat 3.44 CO2 14.8--> 12.2  K 5.1--> 5.2  LA 3.6 yesterday pm. --> LA 7.3 now.   hgb 12.2  WBC 12.6  ddimer >20    UA +lce, nitr WBC, bact.     CXR: LIJ CVC is very deep into RA. small L pleural effusion noted.  elevated R HD.  lungs w/out infiltrates.   CT abd/pelvis: cirrhosisw/ large ascites; lesions in liver.  distended GB w/ thickening and +stones.  esophageal varices. emeda/thickening R breast.      Assessment and Plan:  1. severe sepsis  2. covid infection  3. EMILIA  4. lactic acidosis  5. GI bleed w/ known esophageal varices on CT abd/pelvis  6. underlying cirrhosis, liver CA likely.   7. hypoglycemia  8. coagulopathy from heparin gtt, from cirrhosis.     Plan:   1. cont vasopressor support w/ NE, PE and Vaso gtt.  goal SBP>100 or MAP>65  2. Pt has received a signficant  amount of volume between fluid boluses and blood products--> d/c further fluid bolus.   Start bicarb gtt given pH 7.12 and renal failure.    LA now elevated--> likely from prev hypotensive events in setting of cirrhosis and difficulty clearing LA.   3. pt has [id number] PRBC and now getting 2U FFP--> check cbc, coags, fibrinogen post transfusion. check trop, cmp, mg, phos, PCT. f/u LA.   4. started on octreotide gtt and PPI 40mg IV q12h already.   5. on zosyn empirically for possible bacterial process.  May have UTI.  doubt bact PNA. at risk for SBP given ascites/cirrhosis and GI bleed.  check PCT.   6. on decadron and remdesivir for COVID PNA.  Consider if remdesivir ok in setting of renal failure.   7. start D10W if needed for hypoglycemia.   8. doppler of LE ordered to eval for DVT--> if pt does have a DVT would get an IVC filter as pt cannot tolerate a/c.    ordered SCD's to be placed only after doppler is done and negative for DVT.     Overall very poor prognosis.    Consider palliative care consultation .     __x___   Video Assessment performed  __x___   Most recent labs reviewed  __x___   Vital Signs reviewed  __x___   Best Practices addressed:                 VTE prophylaxis:scd                 SUP (when indicated):PPI                 Glycemic control:                      Please notify bedside physician when present or [facility] if glc > 180 X 2                 Sepsis guidelines: Y                 Lung protective strategy                 Targeted Temperature Management:    _x____     Spoke with bedside RN  __x___     Orders written      Contact [facility] for any needs if bedside physician is not present.      Interventions Major-Acid-Base disturbance - evaluation and management, Acute renal failure - evaluation and management, Sepsis - evaluation and management, Shock - evaluation and management  Intermediate-Best-practice therapies (e.g. VTE, beta blocker, etc.), Communication with other  healthcare providers and/or family  Minor-Clinical assessment - ordering diagnostic tests        Electronically Signed by: Lanie Victoria) on 03/13/2021 05:27

## 2021-03-13 NOTE — PROGRESS NOTES
Primary hospitalist: Dr. Behbahani    Reason for Consultation:       Chief complaint:   Chief Complaint   Patient presents with   • Shortness of Breath       History of present illness:     86-year-old female with a past medical history of liver cirrhosis, possible hepatocellular carcinoma with multiple mets in the liver was recently admitted a few days ago due to COVID-19 infection.  She was found to have EMILIA, severely malnourished with low albumin and generalized fatigue with new onset ascites.  During hospitalization she was offered paracentesis which she declined.  She was given IV fluid and supportive treatment.  Oncology was consulted for concern for HCC.  Unfortunately patient left AGAINST MEDICAL ADVICE.  Unfortunately patient's overall condition deteriorated and patient presented to ER.  D-dimer was elevated and patient was started on heparin drip per [place] protocol. In the ER she was appears to be confused and unable to care detailed history.  Patient had an episode of massive hematemesis and hypotension.  Central line was placed and 2 unit PRBC was transfused.  NG tube was placed and bloody output was noted.  She was started on octreotide drip, IV fluids antibiotics and vasopressor support.  CT abdomen pelvis showed cirrhosis with large ascites, lesions in the liver, distended gallbladder with thickening and positive for stone.  Esophageal varices.  She was admitted to progressive care unit initially for management of hepatorenal syndrome.  She was on antibiotics and dexamethasone for Covid coverage.     Pulmonary medicine team was involved central line placement, possible need for intubation.    Review of Systems:    Review of system could not be obtained as patient is currently intubated and sedated.    Past medical history, past surgical history, social history and family history:  •  has a past medical history of Arthritis, Cirrhosis (CMS/HCC), Gallstone pancreatitis (10/4/2019), Hypertension, IPMN  (intraductal papillary mucinous neoplasm) (9/11/2020), Pancreatitis due to common bile duct stone, and Pseudocyst of pancreas (10/4/2019).  •  has a past surgical history that includes Skin biopsy; ERCP (N/A, 10/4/2019); Cervical polypectomy; and ERCP (N/A, 10/16/2019).  •  reports that she has never smoked. She has never used smokeless tobacco. She reports that she does not drink alcohol and does not use drugs.  • family history includes Cancer in her mother; Diabetes in her mother; Stroke in her father.     Medication and allergies:  • Prior to admission  Current Outpatient Medications   Medication Instructions   • albuterol sulfate  (90 Base) MCG/ACT inhaler 2 puffs, Inhalation, Every 4 Hours PRN   • aspirin 81 mg, Oral, Daily   • enalapril (VASOTEC) 20 MG tablet TAKE ONE TABLET BY MOUTH EVERY DAY FOR BLOOD PRESSURE   • furosemide (LASIX) 20 mg, Oral, Daily   • levoFLOXacin (LEVAQUIN) 250 mg, Oral, Daily, Prior to [facility] Admission, Patient was on: started 03/11/21 for 8 days   • methylPREDNISolone (MEDROL) 4 MG dose pack 1 tablet, Oral, Take As Directed, Prior to [facility] Admission, Patient was on: Take as directed on package instructions, taper dose. Patient hasn't started this yet   • nystatin susp + lidocaine viscous (MAGIC MOUTHWASH) oral suspension 5 mL, Swish & Swallow, 4 Times Daily   • promethazine-dextromethorphan (PROMETHAZINE-DM) 6.25-15 MG/5ML solution 5 mL, Oral, Every 6 Hours PRN, Prior to [facility] Admission, Patient was on: every 4 to 6 hours as needed        • Active medication:  albumin human, 25 g, Intravenous, Daily  albumin human, 25 g, Intravenous, Daily  albumin human, 25 g, Intravenous, Daily  albumin human, 25 g, Intravenous, Daily  calcium gluconate, 1 g, Intravenous, Once  doxycycline, 100 mg, Intravenous, Q12H  lactulose, 20 g, Oral, Q4H  lidocaine PF 1%, 5 mL, Injection, Once  pantoprazole, 40 mg, Intravenous, Q12H  piperacillin-tazobactam, 3.375 g, Intravenous, Q12H  sodium  "chloride, 10 mL, Intravenous, Q12H        • Continuous Infusions:  dextrose, 20 mL/hr, Last Rate: 20 mL/hr (03/13/21 0638)  EPINEPHrine, 0.02-0.3 mcg/kg/min, Last Rate: 0.14 mcg/kg/min (03/13/21 0810)  norepinephrine, 0.02-0.3 mcg/kg/min, Last Rate: 0.28 mcg/kg/min (03/13/21 0646)  octreotide (SandoSTATIN) infusion, 50 mcg/hr, Last Rate: Stopped (03/13/21 0634)  Pharmacy Consult - Remdesivir,   Pharmacy to Dose Zosyn,   phenylephrine, 0.5-3 mcg/kg/min, Last Rate: 3 mcg/kg/min (03/13/21 0810)  vasopressin (PITRESSIN) 20 units in 100 mL infusion, 0.04 Units/min, Last Rate: 0.04 Units/min (03/13/21 0636)         Allergies:    Sulfa antibiotics and Sulfa antibiotics      Vital Signs    height is 152.4 cm (60\") and weight is 88.1 kg (194 lb 4.8 oz). Her oral temperature is 97.5 °F (36.4 °C). Her blood pressure is 94/62 and her pulse is 131 (abnormal). Her respiration is 20 and oxygen saturation is 88% (abnormal).        Physical Exam:  Patient is currently in enhanced droplet/contact precaution for COVID-19 infection.  Patient was examined at bedside with full PPE including N95 mask and PAPR.   General Appearance: Intubated and sedated  HEENT: Normocephalic, atraumatic, normal right and the left external ear.  Normal nose.  Lungs: On ACVC mode of ventilation.negative for rales.negative for wheezes.negative for rhonchi.    Heart: Tachycardic.   Regular rhythm.  S1 normal and S2 normal.    Abdomen: Tense and distended abdomen.  Ultrasound abdomen showed accumulation of peritoneal fluid  Extremities: Positive for lower extremity edema.  Neurological: Could not be assessed as patient is currently intubated and sedated  Pupils: Pupils are round and equal         Lines, drains and Airways:  Patient has left IJ central and arterial line.  Hernandez catheter noted.  Orotracheally intubated    Result review:  ABG reviewed.  Increased troponin level.  Elevated creatinine, elevated liver enzymes.  Elevated serum ferritin.Elevated " lactate level.  Hyperammonemia.  Elevated INR.    Assessment and plan:  · Shock: Hypovolemic due to GI hemorrhage and sepsis  · Patient was intubated for airway protection in the setting of hematemesis, hepatic encephalopathy and shock  · Acute hypoxic respiratory failure due to COVID 19 pneumonia  · Acute decompensation of cirrhosis with concern of variceal bleed.  · Anion gap metabolic acidosis due to EMILIA and lactic acidosis  · Transaminitis pattern of liver injury in the setting of cirrhosis.  Concern for HCC.  · Elevated troponin level in the setting of hypotension and EMILIA.  Concern for demand ischemia.  · Worsening coagulopathy      Patient is currently on significant vasopressor support.  In the setting of hematemesis and high risk of hepatic encephalopathy, I discussed emergent intubation and initiation of mechanical ventilation to protect airway with Dr. Behbahani.  I also discussed the critical condition of the patient with her family.  Dr. Behbahani was present during the discussion. Patient is awaiting transfer to [facility] center of care for further evaluation of GI bleed.  Our facility does not have hepatology/GI services.  He has been accepted at [facility].  Patient is a very high risk of significant variceal bleed versus ulcer bleed.  Patient is currently on IV Versed drip.  RASS goal of 0 to -1.  She is currently on IV norepinephrine at the rate of 0.28 mcg/kg/min, IV vasopressin and IV phenylephrine at 3 mcg/kg/min.  Titrate vasopressors to keep MAP goal of 65 mmHg.  Trend lactate  On blood cultures and urine culture.  Ordered respiratory culture   Patient is currently on IV vasopressor support for shock management.  Patient received IV crystalloids and blood products.  On IV D10 drip to prevent episodes of hypoglycemia.  Nephrology consulted for EMILIA management.  Lactulose DC for hepatic encephalopathy management  I discussed the risk and the benefits of paracentesis in the setting of abdominal  distention and EMILIA.  Paracentesis will be diagnostic and therapeutic.  On IV Zosyn and IV doxycycline.  On IV albumin.  Ordered for amp of sodium bicarb pushes.  Repeat ABG. Recommend prn bicarb pushes to keep ph > 7.2.   follow-up on venous ultrasound Dopplers.  Ordered echo and EKG  Serial H&H.  Transfuse PRBC if hemoglobin is less than 8 in the setting of cardiac ischemia  Recommend correcting coagulopathy. Patient received prbc, ffp and platelet transfusion.   N.p.o. for now  On IV PPI twice daily  On IV octreotide      Guarded prognosis.       The clinical plan was discussed with Dr.Behbahani and Dr Medina.  The clinical plan was discussed with the nurse, and respiratory therapist.      I, Bridger Hsu M.D. attest that the above note accurately reflects the work and decisions made by me. Patient was seen and evaluated by me, including history of present illness, physical exam, assessment, and treatment plan.  The above note was reviewed and edited by me.    Critical Care Time: 86 minutes.  Critical care time was necessary to treat or prevent imminent or life-threatening deterioration of the following conditions: shock.  Time was spent by me performing the following activities: development of treatment plan with patient or surrogate, discussion with consultants, discussion with primary provider, examination of patient, obtaining history from patient or surrogate, ordering and performing treatments and interventions, ordering and review of labaratory studies, ordering and reveiw of radiographic studies, review of old charts and ventilator management.    Thank you for involving me in the care of the patient.  Bridger Hsu M.D  Pulmonary and Critical Care Medicine

## 2021-03-13 NOTE — SIGNIFICANT NOTE
AICU placed orders to change VT to 300 from 400 also changed rate to 20 from 22. Patient did not tolerate settings patient was placed back on  and rate 22. Changes were made by RN

## 2021-03-13 NOTE — ANESTHESIA PROCEDURE NOTES
Arterial Line    Pre-sedation assessment completed: 3/13/2021 10:07 AM    Patient reassessed immediately prior to procedure    Patient location during procedure: ICU  Start time: 3/13/2021 9:10 AM  Stop Time:3/13/2021 9:20 AM       Performed By   Anesthesiologist: Robert Irving DO  Preanesthetic Checklist  Completed: patient identified, IV checked, site marked, risks and benefits discussed, surgical consent, monitors and equipment checked, pre-op evaluation and timeout performed  Arterial Line Prep   Sterile Tech: cap, gloves, gown, mask and sterile barriers  Prep: DuraPrep  Patient monitoring: blood pressure monitoring, continuous pulse oximetry and EKG  Arterial Line Procedure   Laterality:left  Location:  femoral artery  Catheter size: 20 G   Guidance: landmark technique and palpation technique  Number of attempts: 1  Successful placement: yes  Post Assessment   Dressing Type: line sutured, occlusive dressing applied and secured with tape.   Complications no  Circ/Move/Sens Assessment: normal.   Patient Tolerance: patient tolerated the procedure well with no apparent complications

## 2021-03-13 NOTE — PROGRESS NOTES
Peritoneal fluid cell count resulted with greater than 3000 nucleated cell with 76% neutrophils.  This qualifies for SBP, most likely cause of shock.  Continue current management as mentioned in prior note.  Son here for update and notified. I have spoken to daughter, Payal and updated her as well.    CVP is 7. Extremities are mottled. Will give I L IVF bolus. Bicarb lower at 13.7. starting bicarb gtt in d5w (can d/c d10W; started for declining BG to prevent hypoglycemia). Repeat ABG with improving PH (metabolic acidosis) by increasing RR on vent per pulm. however, lactate continues to rise. Oxygenating well so far on 40% FiO2. Remains of 4 vasopressors. No more GIB noted. hgb 13 x 2.    At 18:30 pt noted became hypoxic, hypoglycemic, possible ischemic toes and fingers. Remains on 4 pressors due to persistent hypotension. I have updated Payal, daughter. She has discussed code status with the rest of family and decided not to pursue CPR in case of cardiac arrest. In the meantime administering D50 amps to correct hypoglycemia, changing vent setting to oxygenate better and confirming sats with ABG since pulse ox is likely not accurate due to vasoconstriction of digits.

## 2021-03-13 NOTE — PROGRESS NOTES
Psychiatric HOSPITALIST PROGRESS NOTE     Patient Identification:  Name:  Janeth Mitchell  Age:  86 y.o.  Sex:  female  :  1935  MRN:  [id number]  Visit Number:  [id number]  ROOM: [address]     Primary Care Provider:  Alexx Tafoya MD     Date of Admission: 3/12/2021    Length of stay in inpatient status:  1    Subjective     Chief Compliant:    Chief Complaint   Patient presents with   • Shortness of Breath     History of Presenting Illness:  85 yo female who was re-admitted on 3/12/2021; she had previously been admitted on 3/8/2021 with severe sepsis secondary to aspiration pneumonia/CAP and COVID-19.  She then wanted to go home AMA on 3/10/2021, even after it was discussed with her that the inflammatory markers were worsening and even after the attending doctor discussed this situation with both daughters and the patient.  Then, the patient came back to the ED on 3/12/2021 with worsening shortness of air and generalized weakness; she was admitted for the same illnesses as she was on 3/8/2021.  I was called to the patient's room due to low blood pressures; the systolic was around 40 mmHg.  She was immediately moved to the CCU.  The patient was obtunded but easily awakened; due to the severity of her current illness, she was not able to give me any history.  Due to COVID-19 visitor restrictions, no family members were at bedside.     Objective     Current Hospital Meds:albumin human, 25 g, Intravenous, BID  albumin human, 25 g, Intravenous, BID  dexamethasone, 6 mg, Intravenous, Daily  doxycycline, 100 mg, Intravenous, Q12H  lidocaine PF 1%, 5 mL, Injection, Once  pantoprazole, 40 mg, Intravenous, Q12H  sodium chloride, 1,000 mL, Intravenous, Once  sodium chloride, 10 mL, Intravenous, Q12H    heparin (porcine), 11 Units/kg/hr, Last Rate: 11 Units/kg/hr (21 0022)  norepinephrine, 0.02-0.3 mcg/kg/min, Last Rate: 0.24 mcg/kg/min (21 0340)  Pharmacy to Dose Zosyn,      phenylephrine, 0.5-3 mcg/kg/min  sodium chloride, 100 mL/hr, Last Rate: 100 mL/hr (03/13/21 0026)  sodium chloride, 150 mL/hr  vasopressin (PITRESSIN) 20 units in 100 mL infusion, 0.04 Units/min      Current Antimicrobial Therapy:  Anti-Infectives (From admission, onward)    Ordered     Dose/Rate Route Frequency Start Stop    03/13/21 0207  Pharmacy to Dose Zosyn     Ordering Provider: Goran Munson MD     Does not apply Continuous PRN 03/13/21 0206 03/20/21 0305    03/12/21 1845  doxycycline (VIBRAMYCIN) 100 mg/100 mL 0.9% NS MBP     Ordering Provider: Goran Munson MD    100 mg  over 60 Minutes Intravenous Every 12 Hours 03/12/21 2100 03/17/21 2059 03/12/21 1525  piperacillin-tazobactam (ZOSYN) 3.375 g/100 mL 0.9% NS IVPB (mbp)     Ordering Provider: Fredi Friend APRN    3.375 g  over 30 Minutes Intravenous Once 03/12/21 1527 03/12/21 1754    03/12/21 1525  vancomycin 1750 mg/500 mL 0.9% NS IVPB ([facility])     Ordering Provider: Fredi Friend APRN    20 mg/kg × 90.7 kg  over 2 Hours Intravenous Once 03/12/21 1527 03/12/21 1811        Current Diuretic Therapy:  Diuretics (From admission, onward)    None        ----------------------------------------------------------------------------------------------------------------------  Vital Signs:  Temp:  [97.6 °F (36.4 °C)-98.9 °F (37.2 °C)] 97.7 °F (36.5 °C)  Heart Rate:  [] 120  Resp:  [12-22] 22  BP: ()/(25-95) 100/29  SpO2:  [94 %-100 %] 94 %  on  Flow (L/min):  [2] 2;   Device (Oxygen Therapy): nasal cannula  Body mass index is 37.95 kg/m².    Wt Readings from Last 3 Encounters:   03/12/21 88.1 kg (194 lb 4.8 oz)   03/09/21 83.7 kg (184 lb 9.6 oz)   01/23/20 76.4 kg (168 lb 6.4 oz)     Intake & Output (last 3 days)       03/10 0701 - 03/11 0700 03/11 0701 - 03/12 0700 03/12 0701 - 03/13 0700    IV Piggyback   1600    Total Intake(mL/kg)   1600 (18.2)    Urine (mL/kg/hr)   100    Total Output   100    Net   +1500               Diet  Regular  ----------------------------------------------------------------------------------------------------------------------  Physical Exam  Vitals reviewed.   Constitutional:       General: She is in acute distress.      Appearance: She is well-developed. She is obese. She is ill-appearing. She is not diaphoretic.      Interventions: Nasal cannula in place.      Comments: Obtunded, will wake up,    HENT:      Head: Normocephalic and atraumatic.      Right Ear: External ear normal.      Left Ear: External ear normal.      Nose: Nose normal.   Eyes:      General: No scleral icterus.        Right eye: No discharge.         Left eye: No discharge.      Conjunctiva/sclera: Conjunctivae normal.      Pupils: Pupils are equal, round, and reactive to light.   Cardiovascular:      Rate and Rhythm: Regular rhythm. Tachycardia present.      Pulses: Normal pulses.      Heart sounds: No murmur.   Pulmonary:      Effort: Pulmonary effort is normal.      Breath sounds: No wheezing or rales.   Abdominal:      General: Abdomen is flat. Bowel sounds are normal. There is no distension.      Palpations: Abdomen is soft.   Musculoskeletal:         General: No swelling, deformity or signs of injury.   Skin:     Capillary Refill: Capillary refill takes less than 2 seconds.      Coloration: Skin is not jaundiced.      Findings: Bruising present. No erythema or rash.   Neurological:      Cranial Nerves: No cranial nerve deficit.   Psychiatric:         Mood and Affect: Affect is blunt.         Behavior: Behavior is cooperative.         Cognition and Memory: Cognition is impaired.      Comments: Decreased attention due to the patient being obtunded; she can follow simple commands.  She does talk and does not have a slurred speech.  She can move her arms and legs equally on both sides.     ----------------------------------------------------------------------------------------------------------------------  Tele:  Sinus tachycardia  120-130's; I have personally reviewed/looked at the telemetry strips.  ----------------------------------------------------------------------------------------------------------------------  LABS:    CBC and coagulation:  Results from last 7 days   Lab Units 03/13/21  0143 03/12/21  1936 03/12/21  1741 03/12/21  1426 03/10/21  0742 03/09/21  0537 03/08/21  2252 03/08/21  1942 03/08/21  1829   LACTATE mmol/L  --   --  3.6* 3.3* 2.3* 3.2* 3.1* 3.2*  --    CRP mg/dL  --   --   --  6.69* 10.32* 6.56*  --   --  3.61*   WBC 10*3/mm3 12.61* 12.29*  --  13.48* 13.36* 16.67*  --   --  19.05*   HEMOGLOBIN g/dL 12.2 12.1  --  11.9* 12.9 13.0  --   --  14.0   HEMATOCRIT % 37.6 36.5  --  34.8 40.1 39.6  --   --  40.8   MCV fL 103.9* 102.2*  --  100.6* 107.2* 103.4*  --   --  100.5*   MCHC g/dL 32.4 33.2  --  34.2 32.2 32.8  --   --  34.3   PLATELETS 10*3/mm3 158 191  --  186 173 175  --   --  212   INR   --   --   --   --   --   --   --   --  1.62*   D DIMER QUANT MCGFEU/mL  --   --   --  >20.00*  --   --   --   --   --      Acid/base balance:  Results from last 7 days   Lab Units 03/12/21  1138 03/08/21  1838   PH, ARTERIAL pH units 7.390 7.475*   PO2 ART mm Hg 59.1* 73.3*   PCO2, ARTERIAL mm Hg 35.0 31.5*   HCO3 ART mmol/L 21.2 23.2     Renal and electrolytes:  Results from last 7 days   Lab Units 03/13/21  0143 03/12/21  1426 03/10/21  0742   SODIUM mmol/L 138 136 135*   POTASSIUM mmol/L 5.1 5.3* 5.0   MAGNESIUM mg/dL 1.8  --   --    CHLORIDE mmol/L 109* 105 108*   CO2 mmol/L 14.8* 19.9* 19.3*   BUN mg/dL 83* 85* 50*   CREATININE mg/dL 3.44* 3.46* 1.53*   EGFR IF NONAFRICN AM mL/min/1.73 13* 13* 32*   CALCIUM mg/dL 8.0* 8.6 9.0   PHOSPHORUS mg/dL 5.4*  --   --    GLUCOSE mg/dL 57* 90 108*     Estimated Creatinine Clearance: 12 mL/min (A) (by C-G formula based on SCr of 3.44 mg/dL (H)).    Liver and pancreatic function:  Results from last 7 days   Lab Units 03/12/21  1936 03/12/21  1426 03/10/21  0742 03/09/21  0537  03/08/21  1829   ALBUMIN g/dL  --  1.77* 1.73* 2.11* 2.36*   BILIRUBIN mg/dL  --  2.0* 2.1* 2.2* 2.4*   ALK PHOS U/L  --  118* 111 125* 148*   AST (SGOT) U/L  --  80* 62* 72* 74*   ALT (SGPT) U/L  --  36* 26 27 28   AMMONIA umol/L 54*  --   --   --   --    LIPASE U/L  --   --   --   --  16     Endocrine function:  Glucose levels from the CMP:  Results from last 7 days   Lab Units 03/13/21  0143 03/12/21  1426 03/10/21  0742 03/09/21  0537 03/08/21  1829   GLUCOSE mg/dL 57* 90 108* 131* 140*     Cardiac:  Results from last 7 days   Lab Units 03/12/21  1426 03/08/21  2101 03/08/21  1829   TROPONIN T ng/mL <0.010  <0.010 <0.010 <0.010   PROBNP pg/mL 375.2  --   --        Cultures:  Lab Results   Component Value Date    COLORU Orange (A) 03/12/2021    CLARITYU Turbid (A) 03/12/2021    PHUR <=5.0 03/12/2021    GLUCOSEU 100 mg/dL (Trace) (A) 03/12/2021    KETONESU Negative 03/12/2021    BLOODU Large (3+) (A) 03/12/2021    NITRITEU Positive (A) 03/12/2021    LEUKOCYTESUR Small (1+) (A) 03/12/2021    BILIRUBINUR Moderate (2+) (A) 03/12/2021    UROBILINOGEN 0.2 E.U./dL 03/12/2021    RBCUA Too Numerous to Count (A) 03/12/2021    WBCUA 21-30 (A) 03/12/2021    BACTERIA 1+ (A) 03/12/2021     Microbiology Results (last 10 days)     Procedure Component Value - Date/Time    Respiratory Panel, PCR (WITHOUT COVID) - Swab, Nasopharynx [[id number]]  (Normal) Collected: 03/09/21 0829    Lab Status: Final result Specimen: Swab from Nasopharynx Updated: 03/09/21 0943     ADENOVIRUS, PCR Not Detected     Coronavirus 229E Not Detected     Coronavirus HKU1 Not Detected     Coronavirus NL63 Not Detected     Coronavirus OC43 Not Detected     Human Metapneumovirus Not Detected     Human Rhinovirus/Enterovirus Not Detected     Influenza B PCR Not Detected     Parainfluenza Virus 1 Not Detected     Parainfluenza Virus 2 Not Detected     Parainfluenza Virus 3 Not Detected     Parainfluenza Virus 4 Not Detected     Bordetella pertussis pcr Not  Detected     Chlamydophila pneumoniae PCR Not Detected     Mycoplasma pneumo by PCR Not Detected     Influenza A PCR Not Detected     RSV, PCR Not Detected     Bordetella parapertussis PCR Not Detected    Narrative:      The coronavirus on the RVP is NOT COVID-19 and is NOT indicative of infection with COVID-19.     COVID-19 and FLU A/B PCR - Swab, Nasopharynx [[id number]]  (Abnormal) Collected: 03/08/21 2252    Lab Status: Final result Specimen: Swab from Nasopharynx Updated: 03/08/21 2336     COVID19 Detected     Influenza A PCR Not Detected     Influenza B PCR Not Detected    Narrative:      Fact sheet for providers: https://www.fda.gov/media/920660/download    Fact sheet for patients: https://www.fda.gov/media/069379/download    Test performed by PCR.  Influenza A and Influenza B negative results should be considered presumptive in samples that have a positive SARS-CoV-2 result.    Competitive inhibition studies showed that SARS-CoV-2 virus, when present at concentrations above 3.6E+04 copies/mL, can inhibit the detection and amplification of influenza A and influenza B virus RNA if present at or below 1.8E+02 copies/mL or 4.9E+02 copies/mL, respectively, and may lead to false negative influenza virus results. If co-infection with influenza A or influenza B virus is suspected in samples with a positive SARS-CoV-2 result, the sample should be re-tested with another FDA cleared, approved, or authorized influenza test, if influenza virus detection would change clinical management.    Blood Culture - Blood, Arm, Right [[id number]] Collected: 03/08/21 1938    Lab Status: Preliminary result Specimen: Blood from Arm, Right Updated: 03/12/21 2000     Blood Culture No growth at 4 days    Blood Culture - Blood, Arm, Right [[id number]] Collected: 03/08/21 1930    Lab Status: Preliminary result Specimen: Blood from Arm, Right Updated: 03/12/21 2000     Blood Culture No growth at 4 days        I have personally looked at the  labs and they are summarized above.  ----------------------------------------------------------------------------------------------------------------------  Detailed radiology reports for the last 24 hours:    Imaging Results (Last 24 Hours)     Procedure Component Value Units Date/Time    XR Chest 1 View [[id number]] Collected: 03/13/21 0002     Updated: 03/13/21 0004    Narrative:      CR Chest 1 Vw    INDICATION:   Evaluate central line placement     COMPARISON:    Earlier today    FINDINGS:  Single portable AP view(s) of the chest.    Left-sided central venous catheter is in place. The tip is in the right atrium. There is no pneumothorax. The lungs are clear          Impression:      New central venous catheter has its tip in the right atrium. There is no pneumothorax    Signer Name: Ricki Perera MD   Signed: 3/13/2021 12:02 AM   Workstation Name: [id number]    [facility] Lung Scan Perfusion Particulate [[id number]] Collected: 03/12/21 1659     Updated: 03/12/21 1701    Narrative:      EXAMINATION: NM LUNG SCAN PERFUSION PARTICULATE-      CLINICAL INDICATION: positive dimer     TECHNIQUE:  3.6 mCi of Tc-99m MAA were administered IV for a perfusion  lung scan. Ventilation could not be performed.     COMPARISON: Chest CT performed on same day.     FINDINGS: Multiple perfusion images demonstrate a relatively homogeneous  pattern of pulmonary capillary tracer distribution throughout the lungs.   Specifically, there are no segmental or sub-segmental defects to  suggest pulmonary embolism.        Impression:      Low suspicion of PE.      This report was finalized on 3/12/2021 4:59 PM by Dr. Go Vann MD.       CT Abdomen Pelvis Without Contrast [[id number]] Collected: 03/12/21 1655     Updated: 03/12/21 1701    Narrative:      EXAM:    CT Abdomen and Pelvis Without Intravenous Contrast     EXAM DATE:    3/12/2021 4:02 PM     CLINICAL HISTORY:    acute renal failure     TECHNIQUE:     Axial computed tomography images of the abdomen and pelvis without  intravenous contrast.  Sagittal and coronal reformatted images were  created and reviewed.  This CT exam was performed using one or more of  the following dose reduction techniques:  automated exposure control,  adjustment of the mA and/or kV according to patient size, and/or use of  iterative reconstruction technique.     COMPARISON:    03/08/2021     FINDINGS:    Lung bases:  Bibasilar consolidative airspace disease.    Mediastinum:  Large hiatal hernia.      ABDOMEN:    Liver:  As described previously, there are 2 ovoid masses along the  dome of the liver is representing metastatic disease versus multifocal  hepatocellular carcinoma versus atypical regenerative nodules. One  lesion is 3.6 cm and the other lesion is 3.6 and meter. No change from  previous.  Severe liver cirrhosis.    Gallbladder and bile ducts:  Distended gallbladder with wall  thickening and cholelithiasis.  No ductal dilation.    Pancreas:  Unremarkable.  No ductal dilation.    Spleen:  Spleen within normal limits.    Adrenals:  Unremarkable.  No mass.    Kidneys and ureters:  Unremarkable.  No obstructing stones.  No  hydronephrosis.    Stomach and bowel:  Sigmoid diverticulosis noted without evidence of  diverticulitis.  No bowel obstruction.      PELVIS:    Appendix:  The appendix is within normal limits.    Bladder:  Hernandez catheter decompresses urinary bladder.  No stones.    Reproductive:  Unremarkable as visualized.      ABDOMEN and PELVIS:    Intraperitoneal space:  Moderate to large volume ascites.  No free  air.    Bones/joints:  No acute fracture.  No dislocation.    Soft tissues:  Anasarca.  Asymmetric thickening and edema of the right  breast either due to positioning or intrinsic breast process.    Vasculature:  Esophageal varices.  Perigastric collateral vessels.  No  abdominal aortic aneurysm.    Lymph nodes:  Unremarkable.  No enlarged lymph nodes.        Impression:      1.  Severe liver cirrhosis and changes of portal hypertension with no  sniffing change in moderate to large volume ascites.  2.  2 solid-appearing lesions along the dome of the liver that are  unchanged from previous. Favor neoplastic etiology.   3.  Distended gallbladder with wall thickening and cholelithiasis.  Presumably related to underlying liver disease and portal hypertension.  4.  Diverticulosis without diverticulitis.  5.  Large hiatal hernia and esophageal varices.  6.  Asymmetric edema and skin thickening right breast. Possibly from  positioning but rule out intrinsic breast process such as malignancy.  7.  Bibasilar consolidative airspace disease and small effusions.  8.  Other nonacute findings as above.     This report was finalized on 3/12/2021 4:59 PM by Dr. Go Vann MD.       CT Chest Without Contrast Diagnostic [[id number]] Collected: 03/12/21 1652     Updated: 03/12/21 1659    Narrative:      EXAM:    CT Chest Without Intravenous Contrast     EXAM DATE:    3/12/2021 4:02 PM     CLINICAL HISTORY:    shortness of breath     TECHNIQUE:    Axial computed tomography images of the chest without intravenous  contrast.  Sagittal and coronal reformatted images were created and  reviewed.  This CT exam was performed using one or more of the following  dose reduction techniques:  automated exposure control, adjustment of  the mA and/or kV according to patient size, and/or use of iterative  reconstruction technique.     COMPARISON:    03/08/2021     FINDINGS:    Lungs:  Consolidative airspace disease with volume loss of the right  lower lobe which likely represents combination of atelectasis with  pneumonia.  Partially consolidative airspace disease left lower lobe  which may represent atelectasis or pneumonia.  Calcified granuloma right  upper lobe.    Pleural space:  Small left greater than right pleural effusions.  No  pneumothorax.    Heart:  Moderate cardiomegaly is noted.  Mild  cardiomegaly with mild  coronary artery calcifications.  No significant pericardial effusion.    Mediastinum:  Large hiatal hernia with hiatal hernia also containing  fluid and herniated FAT.    Bones/joints:  Unremarkable.  No acute fracture.  No dislocation.    Soft tissues:  Asymmetric edema of the right breast with skin  thickening.    Vasculature:  Unremarkable.  No thoracic aortic aneurysm.    Lymph nodes:  Granulomatous changes right hilum with calcified nodes.    Liver:  Severe liver cirrhosis.    Gallbladder and bile ducts:  Distended gallbladder and cholelithiasis.    Intraperitoneal space:  Upper abdominal ascites.       Impression:      1.  Bibasilar partially consolidative airspace disease which may  represent combination of atelectasis with pneumonia.  2.  Small left greater than right nonloculated pleural effusions.  3.  Cardiomegaly.  4.  Severe liver cirrhosis and portal hypertension changes.  5.  Asymmetric edema with skin thickening right breast. This may be  either due to positional changes or could represent underlying breast  malignancy.  6.  Large hiatal hernia with fluid and fat in addition to portion of  stomach.     This report was finalized on 3/12/2021 4:55 PM by Dr. Go Vann MD.       XR Chest AP [[id number]] Collected: 03/12/21 1242     Updated: 03/12/21 1244    Narrative:      EXAM:    XR Chest, 1 View     EXAM DATE:    3/12/2021 11:44 AM     CLINICAL HISTORY:    COVID Evaluation, Cough, Fever     TECHNIQUE:    Frontal view of the chest.     COMPARISON:    03/08/2021     FINDINGS:    Lungs:  Left linear basilar atelectasis.    Pleural space:  Unremarkable.  No pneumothorax.    Heart:  Unremarkable.  No cardiomegaly.    Mediastinum:  Unremarkable.    Bones/joints:  Unremarkable.       Impression:        Linear atelectasis left lung base. Otherwise stable unremarkable  chest.     This report was finalized on 3/12/2021 12:42 PM by Dr. Go Vann MD.           I have personally  looked at the radiology images and I have read the available final reports.    Assessment & Plan      -Severe sepsis that was present on admission due to bibasilar pneumonia, aspiration versus community acquired (HR 98, C-RP 3.61, Lactate 3.2 WBC 19.05), complicated by COVID-19, now with shock, acute blood less causing hypovolumic shock versus septic shock versus a combination of both  -Anion gap metabolic acidosis as well as severe and worsening lactic acidosis causing life threatening acidemia, mild respiratory alkalosis as a compensatory measure  -Life threatening hypoglycemia  -Decompensated Cirrhosis with portal hypertension and large-volume ascites  -Small Bilateral Pleural Effusions  -Multiple Liver Masses, 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  NGT placed as the patient starting having coffee ground emesis after transfer.  Transfusing 2 units of O negative emergently as coffee ground emesis started as soon as she arrived in the CCU; 500 mL out so far.  Also, continuing the Levophed that I started in the PCU; adding Vasopressin at 0.04 units per min and Thai-synephrine  I have also written for 2000 mL of NS bolus.  After all of this, the blood pressures were still low so I have written for 2 more units of PRBC and 2 units of FFP.  10 mg of vitamin K was given as well to try to reverse any bleeding diatheses brought on by the cirrhosis and liver failure.  Goal MAP is 65 mmHg.  I asked ED to place a central line and they did while the patient was in the PCU.  Ordered an Octreotide drip as well.  50 g of Albumin ordered.  Protonix 40 mg BID added just in case an ulcer is present.  Given 3 amps of bicarb as the  acidosis was life threatening and could be contributing to the hypotension; the [facility] physician has ordered a bicarb drip.  Suspect the life threatening hypoglycemia is due to the septic shock; D10 drip added to prevent any further drops in the glucose level.  D50 given as well once the low level was found.  Because of the septic shock component, giving a one time dose of Remdesivir after discussing with Paul Jasso in pharmacy; Cr is still okay but the patient does not have any urine output.  The Remdesivir usually caused kidney issues after several doses.  Also, adding Zosyn to cover for aspiration and discontinuing Rocephin.    VTE Prophylaxis:   Mechanical Order History:     None      Pharmalogical Order History:      Ordered     Dose Route Frequency Stop    03/12/21 1845  heparin (porcine) 5000 UNIT/ML injection 5,000 Units  Status:  Discontinued      5,000 Units SC Every 8 Hours Scheduled 03/12/21 1920    03/12/21 1920  heparin 11838 units/250 mL (100 units/mL) in 0.45 % NaCl infusion  9.97 mL/hr      11 Units/kg/hr IV Titrated --    03/12/21 1920  heparin (porcine) 5000 UNIT/ML injection 5,000 Units      5,000 Units IV Once 03/13/21 0025    03/12/21 1920  heparin (porcine) 5000 UNIT/ML injection 5,000 Units      5,000 Units IV As Needed --              The patient is high risk due to the following diagnoses/reasons:  Shock    Total critical care time is 3 hours.      Goran Munson MD  [facility] Hospitalist  03/13/21  03:55 EST

## 2021-03-13 NOTE — PROCEDURES
Paracentesis Procedure Note     INDICATION: Tense ascites  PROCEDURE : Bridger Hsu MD    Ultrasound used to patric location: Yes     CONSENT:   Consent was obtained from next of kin prior to the procedure. Indications, risks, and benefits were explained at length.      PROCEDURE SUMMARY:   A time-out was performed. My hands were washed immediately prior to the procedure. I wore a surgical cap, mask with protective eyewear, sterile gown and sterile gloves throughout the procedure. The area was cleansed and draped in usual sterile fashion using chlorhexidine scrub. Anesthesia was achieved with 1% lidocaine. The left lower quadrant of the abdomen was prepped and draped in a sterile fashion using chlorhexidine scrub. 1% lidocaine was used to numb the skin, soft tissue and peritoneum. The paracentesis catheter was inserted and advanced with negative pressure until yellow colored fluid was aspirated. Approximately 30 mL of ascitic fluid was collected and sent for laboratory analysis. The catheter was then connected to the vaccutainer and approximately  1.5 liters of additional ascitic fluid was drained. The catheter was removed and no leaking was noted. A bandaid was placed over the puncture wound. The patient tolerated the procedure well without any immediate complications. Estimated blood loss was none.       Bridger Hsu MD

## 2021-03-13 NOTE — PROGRESS NOTES
[facility] Medicine Services  PROGRESS NOTE     Patient Identification:  Name:  Janeth Mitchell  Age:  86 y.o.  Sex:  female  :  1935  MRN:  [id number]  Visit Number:  [id number]  Primary Care Provider:  Alexx Tafoya MD    Length of stay:  1    ----------------------------------------------------------------------------------------------------------------------  Subjective     Chief Complaint:  Follow up for shock of unclear etiology    Patient was admitted last night with shock of unclear etiology as well as recent COVID-19 diagnosis on 3/8 with some SOA now requiring 2L NC.  She was hypotensive, had worsening of EMILIA, slightly confused but conversive, hypoxic requiring 2 L of oxygen.  She does have history of liver cirrhosis with new onset ascites diagnosed last admission but declined paracentesis.  She was asymptomatic from Covid standpoint during last admission and did not require any oxygen.  Continued hospital admission was recommended due to new onset ascites that were worsening as well as new findings of liver masses and EMILIA worsening without clear etiology.  However, she insisted on leaving AGAINST MEDICAL ADVICE to go home on .  She returned to the hospital 24 hours later due to worsening shortness of breath as well as generalized weakness.  Her blood pressure was in the 80s in the ER however, initially improved with IV fluid.  She was admitted to progressive care unit started on IV fluid and albumin infusions due to concern for possible hepatorenal syndrome causing EMILIA and possible sepsis from COVID vs early onset bacterial CAP vs SBP (although, no abdominal tenderness noted on exam).  She was started on antibiotics for possible sources of infection and dexamethasone for Covid coverage.  However creatinine clearance was too low to meet criteria for remdesivir.  Her D-dimer was greater than 20 therefore, she was started on heparin  drip per [place] protocol.  Her platelets were around 170,000 and no signs of bleeding at the time.  Her ammonia was checked and was 54 but she was conversive and oriented.   Sometime through the night she became hypotensive resistant to all IV fluid therefore was started on Levophed which was quickly maxed. Family was called. daughters and son arrived to the hospital and updated on critical status, code status was reviewed once more. In the meantime she was then transferred to intensive care unit due to persistent hypotension and was started on vasopressin.   Shortly after arrival to ICU had a moderate sized ground coffee emesis (200 cc x 2).  She continued to dry heave and vomit, therefore NG tube was placed with suctioning dark color liquid as well as intermittent bright red blood.  She was immediately started on blood transfusion and received 4 units of PRBC, 2 units of FFP and one unit of platelet given her h/o cirrhosis and concern for possible variceal bleed. She was also started on PPI IV and octreotide drip ordered. Left IJ CVC was placed in the meantime but right UE midline infiltrate and PIV access was difficult. Given she was now on 4 different vasopressors and receiving blood product no IV line was available immediately, therefore, she received 200 mcg octreotide Subcut first then started on drip shortly after. Bicarb drip was stopped (started for EMILIA and systemic acidosis on admission).   BP remained labile and difficult to accurately assess. Therefore, Anesthesiology was consulted for A.line placement. Once obtained showed BP is good range and vasopressors were adjusted accordingly.   she started requiring more oxygen and on 4-5 L NC, breathing was shallow. she was becoming more lethargic and less interactive. intensivist was consulted and recommended Intubation for airway protection. Therefore, with assist of anesthesiology she was intubated.     I have been intermittently given updates to family  members in the waiting room.  Have discussed CODE STATUS in details.  They had wished for patient to remain a full code and be intubated for airway protection.  However still thinking about potential CPR and ACLS protocol if patient had cardiac arrest as her prognosis is extremely poor at this time.  I also explained need for gastroenterology specialist for management of variceal bleed and need for transfer to tertiary care center as GI specialty is not available in this hospital.  They have consented to transfer.  However, had difficulty with bed availability's and other hospitals.  Patient has been accepted at [facility] intensive care unit but no bed available at the time but she is placed on wait list for potential transfer later today.  Given patient presented with shock of unclear etiology and renal failure concerning for possible hepatorenal syndrome her prognosis was poor on arrival.  I have explained that since she is requiring multiple vasopressors in setting of chronic liver disease potentially complicated by hepatorenal syndrome as well as possible malignancy given her age and other comorbidities including Covid pneumonia and now DIC and multiple other complications due to her acute illness hemorrhoid avidity and mortality rate is extremely high and very low chance of survival.  Family states understanding and is appreciative for the care she is receiving and agreeable with work-up and treatment at this time and do consent for transfer when bed becomes available.  All necessary procedures for blood product transfusions ,central line placements, and line placements, intubation, paracentesis, etc. has been discussed with family and consent obtained.       Subjective:  Today, the patient not doing well this morning as mentioned above.  She is confused and lethargic but arousable to touch and verbal commands.  Able to say 1 word at a time and does not follow commands.  She did not express pain when  asked.    ----------------------------------------------------------------------------------------------------------------------  Objective     [facility] [place] Meds:  albumin human, 25 g, Intravenous, Daily  albumin human, 25 g, Intravenous, Daily  albumin human, 25 g, Intravenous, Daily  albumin human, 25 g, Intravenous, Daily  calcium gluconate, 1 g, Intravenous, Once  chlorhexidine, 15 mL, Mouth/Throat, Q12H  doxycycline, 100 mg, Intravenous, Q12H  lactulose, 20 g, Rectal, BID  lidocaine, , ,   lidocaine PF 1%, 5 mL, Injection, Once  pantoprazole, 40 mg, Intravenous, Q12H  piperacillin-tazobactam, 3.375 g, Intravenous, Q12H  sodium bicarbonate, 200 mEq, Intravenous, Once  sodium chloride, 10 mL, Intravenous, Q12H      dextrose, 20 mL/hr, Last Rate: 20 mL/hr (03/13/21 0638)  dextrose, 50 mL/hr  EPINEPHrine, 0.02-0.3 mcg/kg/min, Last Rate: 0.12 mcg/kg/min (03/13/21 0922)  Midazolam 1 mg/mL 100mL NS, 1-10 mg/hr  norepinephrine, 0.02-0.3 mcg/kg/min, Last Rate: 0.28 mcg/kg/min (03/13/21 0646)  octreotide (SandoSTATIN) infusion, 50 mcg/hr, Last Rate: Stopped (03/13/21 0634)  [facility]   [facility]arm[facility] to Dose Zosyn,   phenylephrine, 0.5-3 mcg/kg/min, Last Rate: 3 mcg/kg/min (03/13/21 0810)  vasopressin (PITRESSIN) 20 units in 100 mL infusion, 0.04 Units/min, Last Rate: 0.04 Units/min (03/13/21 0636)      ----------------------------------------------------------------------------------------------------------------------  Vital Signs:  Temp:  [96.3 °F (35.7 °C)-98.9 °F (37.2 °C)] 97.5 °F (36.4 °C)  Heart Rate:  [] 131  Resp:  [12-22] 20  BP: ()/() 94/62  Arterial Line BP: (167-175)/(71-74) 167/71  FiO2 (%):  [40 %] 40 %  Mean Arterial Pressure (Non-Invasive) for the past 24 hrs (Last 3 readings):   Noninvasive MAP (mmHg)   03/13/21 0939 65   03/13/21 0930 57   03/13/21 0914 95     SpO2 Percentage    03/13/21 0930 03/13/21 0939 03/13/21 0950   SpO2: 94% (!) 88% 93%     SpO2:  [67 %-100  %] 93 %  on  Flow (L/min):  [2-4] 4;   Device (Oxygen Therapy): ventilator    Body mass index is 37.95 kg/m².  Wt Readings from Last 3 Encounters:   03/12/21 88.1 kg (194 lb 4.8 oz)   03/09/21 83.7 kg (184 lb 9.6 oz)   01/23/20 76.4 kg (168 lb 6.4 oz)        Intake/Output Summary (Last 24 hours) at 3/13/2021 1114  Last data filed at 3/13/2021 0929  Gross per 24 hour   Intake 7123 ml   Output 100 ml   Net 7023 ml     NPO Diet  ----------------------------------------------------------------------------------------------------------------------  Physical exam:  GEN: Critically ill-appearing, temporal wasting  EYES: Pupils are round and reactive to light, scleral icterus noted  HENT: Dry oral mucosa, no ulcerations, dry blood on tongue  CV: RRR, no murmur, intermittent tachycardic when vasopressors were adjusted  PULM: Poor respiratory effort, decreased breath sounds at the bases but clear to auscultation anteriorly upper lung fields, no wheeze or rhonchi  GI: Very distended, hypoactive bowel sounds, firm abdomen with positive fluid wave  NEURO: Lethargic, only arousable to touch or voice for very short period of time.  Unable to follow commands.  No abnormal posturing noted,  SKIN: Severe bilateral lower extremity pitting edema up to the abdomen, no lower extremity ulcerations, no petechiae.  Ecchymosis over arms noted  ----------------------------------------------------------------------------------------------------------------------      ----------------------------------------------------------------------------------------------------------------------  Results from last 7 days   Lab Units 03/13/21  0729 03/12/21  1426 03/08/21  2101   TROPONIN T ng/mL 0.054* <0.010  <0.010 <0.010     Results from last 7 days   Lab Units 03/13/21  0729 03/12/21  1426   PROBNP pg/mL 286.1 375.2       Results from last 7 days   Lab Units 03/13/21  1050   PH, ARTERIAL pH units 7.099*   PO2 ART mm Hg 84.9   PCO2, ARTERIAL mm Hg  39.4   HCO3 ART mmol/L 12.2*     Results from last 7 days   Lab Units 03/13/21  0755 03/13/21  0729 03/13/21  0358 03/13/21  0357 03/13/21  0143 03/12/21  1741 03/12/21  1426 03/12/21  1426 03/10/21  0742 03/08/21  1942 03/08/21  1829   CRP mg/dL  --   --   --  6.34*  --   --   --  6.69* 10.32*   < > 3.61*   LACTATE mmol/L 8.8*  --  7.3*  --   --  3.6*  --  3.3* 2.3*  --   --    WBC 10*3/mm3  --  13.31* 14.28*  --  12.61*  --    < > 13.48* 13.36*   < > 19.05*   HEMOGLOBIN g/dL  --  16.5* 12.2  --  12.2  --    < > 11.9* 12.9   < > 14.0   HEMATOCRIT %  --  50.9* 37.7  --  37.6  --    < > 34.8 40.1   < > 40.8   MCV fL  --  97.3* 104.1*  --  103.9*  --    < > 100.6* 107.2*   < > 100.5*   MCHC g/dL  --  32.4 32.4  --  32.4  --    < > 34.2 32.2   < > 34.3   PLATELETS 10*3/mm3  --  132* 185  --  158  --    < > 186 173   < > 212   INR   --  2.67*  --   --   --   --   --   --   --   --  1.62*    < > = values in this interval not displayed.     Results from last 7 days   Lab Units 03/13/21  0729 03/13/21  0357 03/13/21  0143 03/12/21  1426   SODIUM mmol/L 141 137 138 136   POTASSIUM mmol/L 5.2 5.2 5.1 5.3*   MAGNESIUM mg/dL 1.8 1.9 1.8  --    CHLORIDE mmol/L 108* 107 109* 105   CO2 mmol/L 12.3* 12.2* 14.8* 19.9*   BUN mg/dL 78* 84* 83* 85*   CREATININE mg/dL 3.41* 3.48* 3.44* 3.46*   EGFR IF NONAFRICN AM mL/min/1.73 13* 13* 13* 13*   CALCIUM mg/dL 7.7* 7.9* 8.0* 8.6   GLUCOSE mg/dL 109* 47* 57* 90   ALBUMIN g/dL 2.45* 1.93*  --  1.77*   BILIRUBIN mg/dL 2.7* 2.2*  --  2.0*   ALK PHOS U/L 160* 156*  --  118*   AST (SGOT) U/L 606* 290*  --  80*   ALT (SGPT) U/L 161* 89*  --  36*   Estimated Creatinine Clearance: 12.1 mL/min (A) (by C-G formula based on SCr of 3.41 mg/dL (H)).  Ammonia   Date Value Ref Range Status   03/13/2021 80 (H) 11 - 51 umol/L Final   03/12/2021 54 (H) 11 - 51 umol/L Final       Glucose   Date/Time Value Ref Range Status   03/13/2021 0601 86 70 - 130 mg/dL Final     No results found for: HGBA1C  No results  found for: TSH, FREET4    Blood Culture   Date Value Ref Range Status   03/08/2021 No growth at 4 days  Preliminary   03/08/2021 No growth at 4 days  Preliminary       ----------------------------------------------------------------------------------------------------------------------  Imaging Results (Last 24 Hours)     Procedure Component Value Units Date/Time    XR Chest 1 View [[id number]] Collected: 03/13/21 1058     Updated: 03/13/21 1100    Narrative:      CR Chest 1 Vw    INDICATION:   Patient intubated. Evaluate endotracheal tube.     COMPARISON:    3/12/2021    FINDINGS:  Single portable AP view(s) of the chest.    Endotracheal tube is seen with tip projecting over the mid trachea at the level of T3-4. Nasogastric tube is seen with the tip projecting over the mid stomach. A left jugular line is seen with the tip in the right atrium, unchanged. The inspiratory  effort is shallower than on the previous examination. Taking this into account the lungs are unchanged.    Heart and mediastinum are stable.       Impression:      Tubes and supporting devices appear in satisfactory position.    Signer Name: David Marshall MD   Signed: 3/13/2021 10:58 AM   Workstation Name: [id number]    [facility] Venous Doppler Lower Extremity Bilateral (duplex) [[id number]] Resulted: 03/13/21 0729     Updated: 03/13/21 1021    XR Chest 1 View [[id number]] Collected: 03/13/21 0002     Updated: 03/13/21 0004    Narrative:      CR Chest 1 Vw    INDICATION:   Evaluate central line placement     COMPARISON:    Earlier today    FINDINGS:  Single portable AP view(s) of the chest.    Left-sided central venous catheter is in place. The tip is in the right atrium. There is no pneumothorax. The lungs are clear          Impression:      New central venous catheter has its tip in the right atrium. There is no pneumothorax    Signer Name: Ricki Perera MD   Signed: 3/13/2021 12:02 AM   Workstation Name: [email or web address]     [facility] Lung Scan Perfusion Particulate [[id number]] Collected: 03/12/21 1659     Updated: 03/12/21 1701    Narrative:      EXAMINATION: NM LUNG SCAN PERFUSION PARTICULATE-      CLINICAL INDICATION: positive dimer     TECHNIQUE:  3.6 mCi of Tc-99m MAA were administered IV for a perfusion  lung scan. Ventilation could not be performed.     COMPARISON: Chest CT performed on same day.     FINDINGS: Multiple perfusion images demonstrate a relatively homogeneous  pattern of pulmonary capillary tracer distribution throughout the lungs.   Specifically, there are no segmental or sub-segmental defects to  suggest pulmonary embolism.        Impression:      Low suspicion of PE.      This report was finalized on 3/12/2021 4:59 PM by Dr. Go Vann MD.       CT Abdomen Pelvis Without Contrast [[id number]] Collected: 03/12/21 1655     Updated: 03/12/21 1701    Narrative:      EXAM:    CT Abdomen and Pelvis Without Intravenous Contrast     EXAM DATE:    3/12/2021 4:02 PM     CLINICAL HISTORY:    acute renal failure     TECHNIQUE:    Axial computed tomography images of the abdomen and pelvis without  intravenous contrast.  Sagittal and coronal reformatted images were  created and reviewed.  This CT exam was performed using one or more of  the following dose reduction techniques:  automated exposure control,  adjustment of the mA and/or kV according to patient size, and/or use of  iterative reconstruction technique.     COMPARISON:    03/08/2021     FINDINGS:    Lung bases:  Bibasilar consolidative airspace disease.    Mediastinum:  Large hiatal hernia.      ABDOMEN:    Liver:  As described previously, there are 2 ovoid masses along the  dome of the liver is representing metastatic disease versus multifocal  hepatocellular carcinoma versus atypical regenerative nodules. One  lesion is 3.6 cm and the other lesion is 3.6 and meter. No change from  previous.  Severe liver cirrhosis.    Gallbladder  and bile ducts:  Distended gallbladder with wall  thickening and cholelithiasis.  No ductal dilation.    Pancreas:  Unremarkable.  No ductal dilation.    Spleen:  Spleen within normal limits.    Adrenals:  Unremarkable.  No mass.    Kidneys and ureters:  Unremarkable.  No obstructing stones.  No  hydronephrosis.    Stomach and bowel:  Sigmoid diverticulosis noted without evidence of  diverticulitis.  No bowel obstruction.      PELVIS:    Appendix:  The appendix is within normal limits.    Bladder:  Hernandez catheter decompresses urinary bladder.  No stones.    Reproductive:  Unremarkable as visualized.      ABDOMEN and PELVIS:    Intraperitoneal space:  Moderate to large volume ascites.  No free  air.    Bones/joints:  No acute fracture.  No dislocation.    Soft tissues:  Anasarca.  Asymmetric thickening and edema of the right  breast either due to positioning or intrinsic breast process.    Vasculature:  Esophageal varices.  Perigastric collateral vessels.  No  abdominal aortic aneurysm.    Lymph nodes:  Unremarkable.  No enlarged lymph nodes.       Impression:      1.  Severe liver cirrhosis and changes of portal hypertension with no  sniffing change in moderate to large volume ascites.  2.  2 solid-appearing lesions along the dome of the liver that are  unchanged from previous. Favor neoplastic etiology.   3.  Distended gallbladder with wall thickening and cholelithiasis.  Presumably related to underlying liver disease and portal hypertension.  4.  Diverticulosis without diverticulitis.  5.  Large hiatal hernia and esophageal varices.  6.  Asymmetric edema and skin thickening right breast. Possibly from  positioning but rule out intrinsic breast process such as malignancy.  7.  Bibasilar consolidative airspace disease and small effusions.  8.  Other nonacute findings as above.     This report was finalized on 3/12/2021 4:59 PM by Dr. Go Vann MD.       CT Chest Without Contrast Diagnostic [[id number]]  Collected: 03/12/21 1652     Updated: 03/12/21 1659    Narrative:      EXAM:    CT Chest Without Intravenous Contrast     EXAM DATE:    3/12/2021 4:02 PM     CLINICAL HISTORY:    shortness of breath     TECHNIQUE:    Axial computed tomography images of the chest without intravenous  contrast.  Sagittal and coronal reformatted images were created and  reviewed.  This CT exam was performed using one or more of the following  dose reduction techniques:  automated exposure control, adjustment of  the mA and/or kV according to patient size, and/or use of iterative  reconstruction technique.     COMPARISON:    03/08/2021     FINDINGS:    Lungs:  Consolidative airspace disease with volume loss of the right  lower lobe which likely represents combination of atelectasis with  pneumonia.  Partially consolidative airspace disease left lower lobe  which may represent atelectasis or pneumonia.  Calcified granuloma right  upper lobe.    Pleural space:  Small left greater than right pleural effusions.  No  pneumothorax.    Heart:  Moderate cardiomegaly is noted.  Mild cardiomegaly with mild  coronary artery calcifications.  No significant pericardial effusion.    Mediastinum:  Large hiatal hernia with hiatal hernia also containing  fluid and herniated FAT.    Bones/joints:  Unremarkable.  No acute fracture.  No dislocation.    Soft tissues:  Asymmetric edema of the right breast with skin  thickening.    Vasculature:  Unremarkable.  No thoracic aortic aneurysm.    Lymph nodes:  Granulomatous changes right hilum with calcified nodes.    Liver:  Severe liver cirrhosis.    Gallbladder and bile ducts:  Distended gallbladder and cholelithiasis.    Intraperitoneal space:  Upper abdominal ascites.       Impression:      1.  Bibasilar partially consolidative airspace disease which may  represent combination of atelectasis with pneumonia.  2.  Small left greater than right nonloculated pleural effusions.  3.  Cardiomegaly.  4.  Severe liver  cirrhosis and portal hypertension changes.  5.  Asymmetric edema with skin thickening right breast. This may be  either due to positional changes or could represent underlying breast  malignancy.  6.  Large hiatal hernia with fluid and fat in addition to portion of  stomach.     This report was finalized on 3/12/2021 4:55 PM by Dr. Go Vann MD.             ----------------------------------------------------------------------------------------------------------------------  Assessment/Plan     · Distributive shock of unclear etiology  · Anuric EMILIA/hepatorenal syndrome versus ATN  · Acute upper GI bleed suspected variceal bleed  · Uremia due to EMILIA  · Worsening lactic acidosis  · Worsening transaminitis  · Acute hypoxic respiratory failure due to Covid pneumonia  · Acute Covid pneumonia  · Possible early onset bacterial pneumonia coinfection versus SBP  · DIC due to acute illness  · Coagulopathy due to DIC and liver failure  · Acute mild thrombocytopenia due to DIC  · Elevated D-dimer due to Covid infection  · Acute metabolic acidosis due to EMILIA lactic acidosis  · Possible rhabdomyolysis  · Decompensated liver cirrhosis with new onset ascites  · Severe malnutrition  · Severe hypoalbuminemia  · Possible liver malignancy  · Recent diagnosis of intraductal papillary mucinous neoplasm    Etiology of shock remains unknown as patient was admitted with hypotension and evidence of renal failure with unclear etiology of infection.  Procalcitonin has resulted today and is elevated at 1.07.  CT chest obtained yesterday did not show significant infiltrates explaining degree of shock however she was started on IV antibiotics for potential early onset pneumonia.  She was also diagnosed with UTI during last admission and was placed on oral antibiotic.  Antibiotics were continued during this admission for the same.  She had some ascites on admission but did not have any abdominal tenderness.  Antibiotics would have covered a  potential SBP w/ plans to obtain paracentesis today.  Given progression of ascites and abdominal distention tense on exam I am concerned that she could develop abdominal compartment syndrome, therefore discussed with intensivist and planning on large-volume paracentesis enough to decrease pressure from the abdomen and internal organs.  Fluid will be sent for culture and cell count.  In the meantime nephrology was also consulted to assist with treatment and further work-up of renal failure.  Same concern remains ATN versus hepatorenal syndrome.  Patient had received 1mg/kg/day albumin since admission, dose was changed to 100 gm x 1 this am while hypotensive to assist with oncotic pressure.  Given blood pressure has improved after volume resuscitation and vasopressors albumin doses have been changed and split through multiple doses per nephrology. CBC repeated after blood transfusions and hemoglobin is currently 16.5 however likely falsely elevated.  Slight thrombocytopenia noted with platelets at 123K from 170K.  Lactic acid noted to be elevated at 7.3 from 3.3 from this admission as well as prior admission and increased to 8.8.  will continue to trend lactate, H&H, BMP and replace electrolytes as needed.  Patient currently has evidence of DIC likely due to shock with elevated D-dimer, INR, low fibrinogen.  Since start of octreotide drip GI bleed had stopped and no further episodes noted so far.  We will continue PPI IV and octreotide drip for now and trend H&H as mentioned above.  Noted blood glucose trending down therefore started on D10w drip will titrate as needed.   Of note pneumonia noted to be trending up from 50-80.  I have started oral lactulose every 4 hours for now.  This will be titrated per her hemodynamics. Will cont to follow blood, urine, peritoneal fluid cultures.     Patient remains extremely critical with very high risk of mortality.  will continue current management and update family accordingly  while awaiting transfer to [facility].   --------------------------------------------------  DVT Prophylaxis: scd     Disposition: Continue ICU care.  Patient is intubated and mildly sedated on vasopressors.  Awaiting transfer to [facility] for higher level of care once bed is available.  I have spent 3.5 hours of critical care time.   --------------------------------------------------      Katayoun Behbahani, MD  Hospitalist Service -- [facility]     03/13/21  11:14 EST

## 2021-03-13 NOTE — PLAN OF CARE
Goal Outcome Evaluation:           Problem: Adult Inpatient Plan of Care  Goal: Plan of Care Review  Outcome: Ongoing, Not Progressing  Goal: Patient-Specific Goal (Individualized)  Outcome: Ongoing, Not Progressing  Goal: Absence of Hospital-Acquired Illness or Injury  Outcome: Ongoing, Not Progressing  Goal: Optimal Comfort and Wellbeing  Outcome: Ongoing, Not Progressing  Intervention: Provide Person-Centered Care  Recent Flowsheet Documentation  Taken 3/13/2021 0400 by Dayna Reyes RN  Trust Relationship/Rapport:   care explained   choices provided   emotional support provided   empathic listening provided   questions answered   questions encouraged   reassurance provided   thoughts/feelings acknowledged  Goal: Readiness for Transition of Care  Outcome: Ongoing, Not Progressing     Problem: Fall Injury Risk  Goal: Absence of Fall and Fall-Related Injury  Outcome: Ongoing, Not Progressing     Problem: Skin Injury Risk Increased  Goal: Skin Health and Integrity  Outcome: Ongoing, Not Progressing  Intervention: Optimize Skin Protection  Recent Flowsheet Documentation  Taken 3/13/2021 0400 by Dayna Reyes RN  Pressure Reduction Techniques:   heels elevated off bed   positioned off wounds   pressure points protected  Pressure Reduction Devices: pressure-redistributing mattress utilized  Skin Protection:   transparent dressing maintained   skin-to-device areas padded   skin-to-skin areas padded   skin sealant/moisture barrier applied   incontinence pads utilized   adhesive use limited    Pt transferred over from PCU emergently for hypotension. Multiple blood product rapidly infused for hypotensive shock. Pt very unstable at this time will cont to monitor closely.

## 2021-03-13 NOTE — DISCHARGE SUMMARY
[facility] HOSPITALIST MEDICINE DISCHARGE SUMMARY    Patient Identification:  Name:  Janeth Mitchell  Age:  86 y.o.  Sex:  female  :  1935  MRN:  [id number]  Visit Number:  [id number]    Date of Admission: 3/8/2021  Date of Discharge: 3/10/2021    PCP: Alexx Tafoya MD    DISCHARGE DIAGNOSIS   1.  Severe sepsis  2.  Possible GI malignancy  3.  Suspected Candida esophagitis  4.  Massive ascites  5.  Acute kidney injury  6.  Severe protein/calorie malnutrition  7.  Essential hypertension      CONSULTS  1. Dr. De La Fuente, Heme/Onc      PROCEDURES PERFORMED   None      HOSPITAL COURSE  Ms. Mitchell is a 86 y.o. female who presented to [facility] ED on 3/9/2021 with a chief complaint of vomiting.  Patient has a past medical history remarkable for essential hypertension, arthritis and hepatic cirrhosis with massive ascites.  Patient reported having onset of nausea and vomiting several days prior to evaluation in emergency department.  She also endorsed pain in her throat which was a burning sensation which has kept her from eating much in the past week.  For these reasons, patient did present to the emergency department for further treatment and evaluation.  Initial evaluation in the emergency department did consist of basic laboratory work as well as physical exam and vital signs.  Initial vital signs found patient's blood pressure 156/97, respirations 18, heart rate 98, temperature 97.7 °F and O2 saturation 92% on room air.  ABG demonstrated pH of 7.47, PCO2 31, PO2 73 and bicarb 23.  CBC and CMP were obtained.  CMP demonstrated CO2 of 20.6 with an alk phos of 148, AST 74 and total bilirubin 2.4 with albumin 2.36.  CBC demonstrated elevated white blood cell count of 19.  Urinalysis was obtained which demonstrated 3+ blood with positive nitrites and 1+ leukocyte esterase with 6-12 WBCs per high-power field and 3-6 epithelial cells per high-power field.  CT chest demonstrated  bibasilar consolidation suspicious of aspiration pneumonia versus atelectasis.  CT of abdomen and pelvis demonstrated cirrhosis with evidence of portal hypertension and large volume ascites.  Also noted were suspicious masses within the liver suspicious of hepatocellular carcinoma.  Overall, patient was diagnosed with severe sepsis felt to be secondary to possible aspiration pneumonia and admitted for further treatment and evaluation.    In regards to possible aspiration pneumonia, patient was started on doxycycline and Flagyl.  In regards to throat pain, patient was started on nystatin swish and swallow which did seem to improve her symptoms.  In regards to multiple liver masses, I personally contacted patient's GI physician in [place] and discussed this case with him.  Also discussed case at length with radiology and hematology/oncology services.  Liver masses were felt too risky to biopsy with CT-guided biopsy.  Patient is not a good candidate for surgical biopsy.  Did discuss different options with patient and her family regarding further work-up of possible liver masses.  It should be noted CEA, CA 19-9 and AFP were all within normal limits which may patient's presentation not very clear.  Patient was also offered paracentesis for both therapeutic and diagnostic purposes.  Unfortunately, patient declined paracentesis.  Patient also stated she no longer wished to be hospitalized and requested to be discharged home.  Patient was strongly cautioned that this was not a good decision as she was still ill with mildly elevated white blood cell count with general malaise.  Patient was also cautioned that discontinuation of current work-up and medical treatment may result in worsening of her overall health and at worst case scenario could lead to her eventual death.  Patient expressed understanding and stated she still wished to leave AGAINST MEDICAL ADVICE.  I asked patient if I could discuss these decisions with her  daughters as they would be the ones coming to transport her from the hospital to leave AGAINST MEDICAL ADVICE.  As such, I did spend approximately 25 minutes to 1 hour discussing patient's case and her desire to leave AGAINST MEDICAL ADVICE with both of her daughters on 2 separate phone calls.  Patient's daughter did call the patient to further discuss patient's decision regarding leaving AGAINST MEDICAL ADVICE.  However, after the conclusion of multiple conversations, patient still decided to leave AGAINST MEDICAL ADVICE.    VITAL SIGNS:      03/08/21  1810 03/09/21  0010   Weight: 81.6 kg (180 lb) 83.7 kg (184 lb 9.6 oz)     Body mass index is 36.05 kg/m².      DISCHARGE DISPOSITION   Stable    DISCHARGE MEDICATIONS:     Discharge Medications      ASK your doctor about these medications      Instructions Start Date   enalapril 20 MG tablet  Commonly known as: VASOTEC   TAKE ONE TABLET BY MOUTH EVERY DAY FOR BLOOD PRESSURE      furosemide 20 MG tablet  Commonly known as: LASIX   20 mg, Oral, Daily                   Follow-up Information     Alexx Tafoya MD .    Specialty: Internal Medicine  Contact information:  [address] [place] [id number] [phone]                   TEST  RESULTS PENDING AT DISCHARGE  Pending Labs     Order Current Status    Blood Culture - Blood, Arm, Right Preliminary result    Blood Culture - Blood, Arm, Right Preliminary result           Rivas Salazar,   03/13/21  18:57 EST    Please note that this discharge summary required more than 30 minutes to complete.    Please send a copy of this dictation to the following providers:  Alexx Tafoya MD

## 2021-03-13 NOTE — CONSULTS
Nephrology Consult Note    Referring Provider: Dr. Behbahani  Reason for Consultation: EMILIA    Subjective       History of present illness:  Janeth Mitchell is a 86 y.o. female who presented to [facility] emergency department with chief complaint of not feeling well, abd distension and worsening shortness of breath.   Pt was recently admitted to hospital, was tested positive for COVID and found to have EMILIA, liver cihhrosis, with ascites  And liver nodules likely HCC. She declined most of medical interventions and left AMA and presenting again with further worsening shortness of breath, abd distension, generalized weakness.   She was hypotensive, was  Started on IVF, IV albumin, octreotide and was admitted to PCU and now has been transferred to CCU with worsening hypotension, shock requiring pressors support.   Nephrology was  Consulted for management of EMILIA      History  Past Medical History:   Diagnosis Date   • Arthritis    • Cirrhosis (CMS/HCC)    • Gallstone pancreatitis 10/4/2019   • Hypertension    • IPMN (intraductal papillary mucinous neoplasm) 9/11/2020   • Pancreatitis due to common bile duct stone    • Pseudocyst of pancreas 10/4/2019   ,   Past Surgical History:   Procedure Laterality Date   • CERVICAL POLYPECTOMY     • ERCP N/A 10/4/2019    Procedure: ENDOSCOPIC RETROGRADE CHOLANGIOPANCREATOGRAPHY;  Surgeon: Kranthi Hercules MD;  Location:  [facility];  Service: Gastroenterology   • ERCP N/A 10/16/2019    Procedure: ENDOSCOPIC RETROGRADE CHOLANGIOPANCREATOGRAPHY;  Surgeon: Kranthi Hercules MD;  Location:  [facility];  Service: Gastroenterology   • SKIN BIOPSY     ,   Family History   Problem Relation Age of Onset   • Diabetes Mother    • Cancer Mother    • Stroke Father    • Colon polyps Neg Hx    • Colon cancer Neg Hx    ,   Social History     Tobacco Use   • Smoking status: Never Smoker   • Smokeless tobacco: Never Used   Vaping Use   • Vaping Use:  Never used   Substance Use Topics   • Alcohol use: No   • Drug use: No   ,   Medications Prior to Admission   Medication Sig Dispense Refill Last Dose   • albuterol sulfate  (90 Base) MCG/ACT inhaler Inhale 2 puffs Every 4 (Four) Hours As Needed for Wheezing.   3/12/2021 at Unknown time   • aspirin 81 MG EC tablet Take 81 mg by mouth Daily.   3/12/2021 at Unknown time   • enalapril (VASOTEC) 20 MG tablet TAKE ONE TABLET BY MOUTH EVERY DAY FOR BLOOD PRESSURE   3/12/2021 at Unknown time   • furosemide (LASIX) 20 MG tablet Take 20 mg by mouth Daily.   3/12/2021 at Unknown time   • levoFLOXacin (LEVAQUIN) 250 MG tablet Take 250 mg by mouth Daily. Prior to [facility] Admission, Patient was on: started 03/11/21 for 8 days   3/12/2021 at Unknown time   • methylPREDNISolone (MEDROL) 4 MG dose pack Take 1 tablet by mouth Take As Directed. Prior to [facility] Admission, Patient was on: Take as directed on package instructions, taper dose. Patient hasn't started this yet   Not started yet at Not started yet   • nystatin susp + lidocaine viscous (MAGIC MOUTHWASH) oral suspension Swish and swallow 5 mL 4 (Four) Times a Day.   3/12/2021 at Unknown time   • promethazine-dextromethorphan (PROMETHAZINE-DM) 6.25-15 MG/5ML solution Take 5 mL by mouth Every 6 (Six) Hours As Needed for Cough. Prior to [facility] Admission, Patient was on: every 4 to 6 hours as needed   3/11/2021 at Unknown time   , Scheduled Meds:  albumin human, 25 g, Intravenous, Daily  albumin human, 25 g, Intravenous, Daily  albumin human, 25 g, Intravenous, Daily  albumin human, 25 g, Intravenous, Daily  calcium gluconate, 1 g, Intravenous, Once  chlorhexidine, 15 mL, Mouth/Throat, Q12H  doxycycline, 100 mg, Intravenous, Q12H  lactulose, 20 g, Oral, Q4H  lidocaine, , ,   lidocaine PF 1%, 5 mL, Injection, Once  pantoprazole, 40 mg, Intravenous, Q12H  piperacillin-tazobactam, 3.375 g, Intravenous, Q12H  sodium chloride, 10 mL, Intravenous, Q12H    , Continuous  Infusions:  dextrose, 20 mL/hr, Last Rate: 20 mL/hr (03/13/21 0638)  EPINEPHrine, 0.02-0.3 mcg/kg/min, Last Rate: 0.12 mcg/kg/min (03/13/21 0922)  Midazolam 1 mg/mL 100mL NS, 1-10 mg/hr  norepinephrine, 0.02-0.3 mcg/kg/min, Last Rate: 0.28 mcg/kg/min (03/13/21 0646)  octreotide (SandoSTATIN) infusion, 50 mcg/hr, Last Rate: Stopped (03/13/21 0634)  Pharmacy Consult - Remdesivir,   Pharmacy to Dose Zosyn,   phenylephrine, 0.5-3 mcg/kg/min, Last Rate: 3 mcg/kg/min (03/13/21 0810)  vasopressin (PITRESSIN) 20 units in 100 mL infusion, 0.04 Units/min, Last Rate: 0.04 Units/min (03/13/21 0636)    , PRN Meds:  •  acetaminophen  •  dextrose  •  ondansetron  •  Pharmacy Consult - Remdesivir  •  Pharmacy to Dose Zosyn  •  [COMPLETED] Insert peripheral IV **AND** sodium chloride  •  sodium chloride and Allergies:  Sulfa antibiotics and Sulfa antibiotics    Review of Systems  Unable to obtain    Objective     Vital Signs  Temp:  [96.3 °F (35.7 °C)-98.9 °F (37.2 °C)] 97.5 °F (36.4 °C)  Heart Rate:  [] 131  Resp:  [12-22] 20  BP: ()/() 94/62  Arterial Line BP: (167-175)/(71-74) 167/71    I/O this shift:  In: 612 [Blood:312; IV Piggyback:300]  Out: -   I/O last 3 completed shifts:  In: 6511 [Blood:811; IV Piggyback:5700]  Out: 100 [Urine:100]    Physical Examination:  General: intubated on MV  Heart: Tele reveals sinus rhythm.   Lungs: Respirations appear to be regular, even and unlabored with no signs of respiratory distress. No audible wheezing.    Abdomen: no distension  Extremities: trace  Skin: No visible bleeding, bruising, or rash.  Neurologic: no apparent focal deficit.       Laboratory Data :      WBC WBC   Date Value Ref Range Status   03/13/2021 13.31 (H) 3.40 - 10.80 10*3/mm3 Final   03/13/2021 14.28 (H) 3.40 - 10.80 10*3/mm3 Final   03/13/2021 12.61 (H) 3.40 - 10.80 10*3/mm3 Final   03/12/2021 12.29 (H) 3.40 - 10.80 10*3/mm3 Final   03/12/2021 13.48 (H) 3.40 - 10.80 10*3/mm3 Final      HGB Hemoglobin    Date Value Ref Range Status   03/13/2021 16.5 (H) 12.0 - 15.9 g/dL Final   03/13/2021 12.2 12.0 - 15.9 g/dL Final   03/13/2021 12.2 12.0 - 15.9 g/dL Final   03/12/2021 12.1 12.0 - 15.9 g/dL Final   03/12/2021 11.9 (L) 12.0 - 15.9 g/dL Final      HCT Hematocrit   Date Value Ref Range Status   03/13/2021 50.9 (H) 34.0 - 46.6 % Final   03/13/2021 37.7 34.0 - 46.6 % Final   03/13/2021 37.6 34.0 - 46.6 % Final   03/12/2021 36.5 34.0 - 46.6 % Final   03/12/2021 34.8 34.0 - 46.6 % Final      Platlets No results found for: LABPLAT   MCV MCV   Date Value Ref Range Status   03/13/2021 97.3 (H) 79.0 - 97.0 fL Final   03/13/2021 104.1 (H) 79.0 - 97.0 fL Final   03/13/2021 103.9 (H) 79.0 - 97.0 fL Final   03/12/2021 102.2 (H) 79.0 - 97.0 fL Final   03/12/2021 100.6 (H) 79.0 - 97.0 fL Final          Sodium Sodium   Date Value Ref Range Status   03/13/2021 141 136 - 145 mmol/L Final   03/13/2021 137 136 - 145 mmol/L Final   03/13/2021 138 136 - 145 mmol/L Final   03/12/2021 136 136 - 145 mmol/L Final      Potassium Potassium   Date Value Ref Range Status   03/13/2021 5.2 3.5 - 5.2 mmol/L Final     Comment:     Slight hemolysis detected by analyzer. Results may be affected.   03/13/2021 5.2 3.5 - 5.2 mmol/L Final     Comment:     Slight hemolysis detected by analyzer. Results may be affected.   03/13/2021 5.1 3.5 - 5.2 mmol/L Final     Comment:     Slight hemolysis detected by analyzer. Results may be affected.   03/12/2021 5.3 (H) 3.5 - 5.2 mmol/L Final     Comment:     Specimen hemolyzed.  Results may be affected.      Chloride Chloride   Date Value Ref Range Status   03/13/2021 108 (H) 98 - 107 mmol/L Final   03/13/2021 107 98 - 107 mmol/L Final   03/13/2021 109 (H) 98 - 107 mmol/L Final   03/12/2021 105 98 - 107 mmol/L Final      CO2 CO2   Date Value Ref Range Status   03/13/2021 12.3 (L) 22.0 - 29.0 mmol/L Final   03/13/2021 12.2 (L) 22.0 - 29.0 mmol/L Final   03/13/2021 14.8 (L) 22.0 - 29.0 mmol/L Final   03/12/2021 19.9  (L) 22.0 - 29.0 mmol/L Final      BUN BUN   Date Value Ref Range Status   03/13/2021 78 (H) 8 - 23 mg/dL Final   03/13/2021 84 (H) 8 - 23 mg/dL Final   03/13/2021 83 (H) 8 - 23 mg/dL Final   03/12/2021 85 (H) 8 - 23 mg/dL Final      Creatinine Creatinine   Date Value Ref Range Status   03/13/2021 3.41 (H) 0.57 - 1.00 mg/dL Final   03/13/2021 3.48 (H) 0.57 - 1.00 mg/dL Final   03/13/2021 3.44 (H) 0.57 - 1.00 mg/dL Final   03/12/2021 3.46 (H) 0.57 - 1.00 mg/dL Final      Calcium Calcium   Date Value Ref Range Status   03/13/2021 7.7 (L) 8.6 - 10.5 mg/dL Final   03/13/2021 7.9 (L) 8.6 - 10.5 mg/dL Final   03/13/2021 8.0 (L) 8.6 - 10.5 mg/dL Final   03/12/2021 8.6 8.6 - 10.5 mg/dL Final      PO4 No results found for: CAPO4   Albumin Albumin   Date Value Ref Range Status   03/13/2021 2.45 (L) 3.50 - 5.20 g/dL Final   03/13/2021 1.93 (L) 3.50 - 5.20 g/dL Final   03/12/2021 1.77 (L) 3.50 - 5.20 g/dL Final      Magnesium Magnesium   Date Value Ref Range Status   03/13/2021 1.8 1.6 - 2.4 mg/dL Final   03/13/2021 1.9 1.6 - 2.4 mg/dL Final   03/13/2021 1.8 1.6 - 2.4 mg/dL Final      Uric Acid No results found for: URICACID     Radiology results :     Imaging Results (Last 72 Hours)     Procedure Component Value Units Date/Time    XR Chest 1 View [[id number]] Resulted: 03/13/21 0945     Updated: 03/13/21 0945    US Venous Doppler Lower Extremity Bilateral (duplex) [[id number]] Resulted: 03/13/21 0729     Updated: 03/13/21 0916    XR Chest 1 View [[id number]] Collected: 03/13/21 0002     Updated: 03/13/21 0004    Narrative:      CR Chest 1 Vw    INDICATION:   Evaluate central line placement     COMPARISON:    Earlier today    FINDINGS:  Single portable AP view(s) of the chest.    Left-sided central venous catheter is in place. The tip is in the right atrium. There is no pneumothorax. The lungs are clear          Impression:      New central venous catheter has its tip in the right atrium. There is no pneumothorax    Signer Name:  Ricki Perera MD   Signed: 3/13/2021 12:02 AM   Workstation Name: [email or web address]    [facility] Lung Scan Perfusion Particulate [[id number]] Collected: 03/12/21 1659     Updated: 03/12/21 1701    Narrative:      EXAMINATION: NM LUNG SCAN PERFUSION PARTICULATE-      CLINICAL INDICATION: positive dimer     TECHNIQUE:  3.6 mCi of Tc-99m MAA were administered IV for a perfusion  lung scan. Ventilation could not be performed.     COMPARISON: Chest CT performed on same day.     FINDINGS: Multiple perfusion images demonstrate a relatively homogeneous  pattern of pulmonary capillary tracer distribution throughout the lungs.   Specifically, there are no segmental or sub-segmental defects to  suggest pulmonary embolism.        Impression:      Low suspicion of PE.      This report was finalized on 3/12/2021 4:59 PM by Dr. Go Vann MD.       CT Abdomen Pelvis Without Contrast [[id number]] Collected: 03/12/21 1655     Updated: 03/12/21 1701    Narrative:      EXAM:    CT Abdomen and Pelvis Without Intravenous Contrast     EXAM DATE:    3/12/2021 4:02 PM     CLINICAL HISTORY:    acute renal failure     TECHNIQUE:    Axial computed tomography images of the abdomen and pelvis without  intravenous contrast.  Sagittal and coronal reformatted images were  created and reviewed.  This CT exam was performed using one or more of  the following dose reduction techniques:  automated exposure control,  adjustment of the mA and/or kV according to patient size, and/or use of  iterative reconstruction technique.     COMPARISON:    03/08/2021     FINDINGS:    Lung bases:  Bibasilar consolidative airspace disease.    Mediastinum:  Large hiatal hernia.      ABDOMEN:    Liver:  As described previously, there are 2 ovoid masses along the  dome of the liver is representing metastatic disease versus multifocal  hepatocellular carcinoma versus atypical regenerative nodules. One  lesion is 3.6 cm and the other lesion is 3.6  and meter. No change from  previous.  Severe liver cirrhosis.    Gallbladder and bile ducts:  Distended gallbladder with wall  thickening and cholelithiasis.  No ductal dilation.    Pancreas:  Unremarkable.  No ductal dilation.    Spleen:  Spleen within normal limits.    Adrenals:  Unremarkable.  No mass.    Kidneys and ureters:  Unremarkable.  No obstructing stones.  No  hydronephrosis.    Stomach and bowel:  Sigmoid diverticulosis noted without evidence of  diverticulitis.  No bowel obstruction.      PELVIS:    Appendix:  The appendix is within normal limits.    Bladder:  Hernandez catheter decompresses urinary bladder.  No stones.    Reproductive:  Unremarkable as visualized.      ABDOMEN and PELVIS:    Intraperitoneal space:  Moderate to large volume ascites.  No free  air.    Bones/joints:  No acute fracture.  No dislocation.    Soft tissues:  Anasarca.  Asymmetric thickening and edema of the right  breast either due to positioning or intrinsic breast process.    Vasculature:  Esophageal varices.  Perigastric collateral vessels.  No  abdominal aortic aneurysm.    Lymph nodes:  Unremarkable.  No enlarged lymph nodes.       Impression:      1.  Severe liver cirrhosis and changes of portal hypertension with no  sniffing change in moderate to large volume ascites.  2.  2 solid-appearing lesions along the dome of the liver that are  unchanged from previous. Favor neoplastic etiology.   3.  Distended gallbladder with wall thickening and cholelithiasis.  Presumably related to underlying liver disease and portal hypertension.  4.  Diverticulosis without diverticulitis.  5.  Large hiatal hernia and esophageal varices.  6.  Asymmetric edema and skin thickening right breast. Possibly from  positioning but rule out intrinsic breast process such as malignancy.  7.  Bibasilar consolidative airspace disease and small effusions.  8.  Other nonacute findings as above.     This report was finalized on 3/12/2021 4:59 PM by Dr. Stiles  EDINSON Vann MD.       CT Chest Without Contrast Diagnostic [[id number]] Collected: 03/12/21 1652     Updated: 03/12/21 1659    Narrative:      EXAM:    CT Chest Without Intravenous Contrast     EXAM DATE:    3/12/2021 4:02 PM     CLINICAL HISTORY:    shortness of breath     TECHNIQUE:    Axial computed tomography images of the chest without intravenous  contrast.  Sagittal and coronal reformatted images were created and  reviewed.  This CT exam was performed using one or more of the following  dose reduction techniques:  automated exposure control, adjustment of  the mA and/or kV according to patient size, and/or use of iterative  reconstruction technique.     COMPARISON:    03/08/2021     FINDINGS:    Lungs:  Consolidative airspace disease with volume loss of the right  lower lobe which likely represents combination of atelectasis with  pneumonia.  Partially consolidative airspace disease left lower lobe  which may represent atelectasis or pneumonia.  Calcified granuloma right  upper lobe.    Pleural space:  Small left greater than right pleural effusions.  No  pneumothorax.    Heart:  Moderate cardiomegaly is noted.  Mild cardiomegaly with mild  coronary artery calcifications.  No significant pericardial effusion.    Mediastinum:  Large hiatal hernia with hiatal hernia also containing  fluid and herniated FAT.    Bones/joints:  Unremarkable.  No acute fracture.  No dislocation.    Soft tissues:  Asymmetric edema of the right breast with skin  thickening.    Vasculature:  Unremarkable.  No thoracic aortic aneurysm.    Lymph nodes:  Granulomatous changes right hilum with calcified nodes.    Liver:  Severe liver cirrhosis.    Gallbladder and bile ducts:  Distended gallbladder and cholelithiasis.    Intraperitoneal space:  Upper abdominal ascites.       Impression:      1.  Bibasilar partially consolidative airspace disease which may  represent combination of atelectasis with pneumonia.  2.  Small left greater than right  nonloculated pleural effusions.  3.  Cardiomegaly.  4.  Severe liver cirrhosis and portal hypertension changes.  5.  Asymmetric edema with skin thickening right breast. This may be  either due to positional changes or could represent underlying breast  malignancy.  6.  Large hiatal hernia with fluid and fat in addition to portion of  stomach.     This report was finalized on 3/12/2021 4:55 PM by Dr. Go Vann MD.       XR Chest AP [[id number]] Collected: 03/12/21 1242     Updated: 03/12/21 1244    Narrative:      EXAM:    XR Chest, 1 View     EXAM DATE:    3/12/2021 11:44 AM     CLINICAL HISTORY:    COVID Evaluation, Cough, Fever     TECHNIQUE:    Frontal view of the chest.     COMPARISON:    03/08/2021     FINDINGS:    Lungs:  Left linear basilar atelectasis.    Pleural space:  Unremarkable.  No pneumothorax.    Heart:  Unremarkable.  No cardiomegaly.    Mediastinum:  Unremarkable.    Bones/joints:  Unremarkable.       Impression:        Linear atelectasis left lung base. Otherwise stable unremarkable  chest.     This report was finalized on 3/12/2021 12:42 PM by Dr. Go Vann MD.               Medications:      albumin human, 25 g, Intravenous, Daily  albumin human, 25 g, Intravenous, Daily  albumin human, 25 g, Intravenous, Daily  albumin human, 25 g, Intravenous, Daily  calcium gluconate, 1 g, Intravenous, Once  chlorhexidine, 15 mL, Mouth/Throat, Q12H  doxycycline, 100 mg, Intravenous, Q12H  lactulose, 20 g, Oral, Q4H  lidocaine, , ,   lidocaine PF 1%, 5 mL, Injection, Once  pantoprazole, 40 mg, Intravenous, Q12H  piperacillin-tazobactam, 3.375 g, Intravenous, Q12H  sodium chloride, 10 mL, Intravenous, Q12H      dextrose, 20 mL/hr, Last Rate: 20 mL/hr (03/13/21 0638)  EPINEPHrine, 0.02-0.3 mcg/kg/min, Last Rate: 0.12 mcg/kg/min (03/13/21 0922)  Midazolam 1 mg/mL 100mL NS, 1-10 mg/hr  norepinephrine, 0.02-0.3 mcg/kg/min, Last Rate: 0.28 mcg/kg/min (03/13/21 0646)  octreotide (SandoSTATIN) infusion, 50  mcg/hr, Last Rate: Stopped (03/13/21 0634)  Pharmacy Consult - Remdesivir,   Pharmacy to Dose Zosyn,   phenylephrine, 0.5-3 mcg/kg/min, Last Rate: 3 mcg/kg/min (03/13/21 0810)  vasopressin (PITRESSIN) 20 units in 100 mL infusion, 0.04 Units/min, Last Rate: 0.04 Units/min (03/13/21 0636)        Assessment/Plan       Acute respiratory failure due to COVID-19 (CMS/HCC)    1. EMILIA on CKD  2. Sever sepsis with likely septic shock  3. Anion gap metabolic acidosis  4. Decompensated Liver cirrhosis with massive ascites, esophageal varices and likely hepatocellular carcinoma  5. Acute on Chronic anemia likely due to blood loss    EMILIA likely multifactorial, ATN, HRS. Baseline Cr 1-1.2, admitted with Cr 3.4, oliguric. Pt is in shock and hemodynamically unstable, and likely needs dialysis. Over all condition is very poor and recommend goals of care discussion, if for full treatment then needs to be transferred for CRRT. I will start on IV albumin 12.5G TID, DC IVF, check urine sodium and continue on pressors for MAP >65mmhg.     D/W Dr. Behbahani and Dr. HOWELL    Thanks Dr Behbahani for the consult. Nephrology will follow the patient.   I discussed the patient's findings and my recommendations with patient and consulting provider    Lawrence Medina MD  03/13/21  10:04 EST

## 2021-03-13 NOTE — NURSING NOTE
At approximately 0900 significant change noted to patients level of consciousness. Pt had been previously restless and attempting to pull at lines and tubes as well as moaning. Pt now lying still in bed and is silent and is now only arouses to pain. Worsening crackles noted to the bilateral anterior lung fields as well as worsening hypotension despite vasopressor support. Dr. behbahani notified and speaking to family about possible need for arterial line placement as well as intubation. Family agreeable to plan of care.   Dr Davis contacted to intubate and place the line.    0938: 5mL etomidate administered via IV    0938: 3mL succinylcholine administered via IV    0939: 7.5 ETT tube placed by dr davis; secured at 20cm at the lip. Positive color change noted on ETCO2 detector, bilateral equal breath sounds auscultated, even chest rise and fall noted. Pending chest xray for tube confirmation.

## 2021-03-13 NOTE — PROCEDURES
Central Venous Catheter Insertion Procedure Note    * No surgery found *    Indications:  vascular access    Procedure Details   Informed consent was obtained for the procedure, including sedation.  Risks of lung perforation, hemorrhage, arrhythmia, and adverse drug reaction were discussed.     Maximum sterile technique was used including antiseptics, cap, gloves, gown, hand hygiene, mask and sheet.    Under sterile conditions the skin above the on the left internal jugular vein was prepped with betadine and covered with a sterile drape. Local anesthesia was applied to the skin and subcutaneous tissues. A 22-gauge needle was used to identify the vein. An 18-gauge needle was then inserted into the vein. A guide wire was then passed easily through the catheter. There were no arrhythmias. The catheter was then withdrawn. A 7.0 Chadian triple-lumen was then inserted into the vessel over the guide wire. The catheter was sutured into place.    Findings:  There were no changes to vital signs. Catheter was flushed with 20 cc NS. Patient did tolerate procedure well.    Recommendations:  CXR ordered to verify placement.

## 2021-03-14 NOTE — PLAN OF CARE
Goal Outcome Evaluation:  Plan of Care Reviewed With: patient, family  Progress: declining  Outcome Summary: Levo, Vaso, Thai, and Epi continue at max doses, Versed at 4mg continues for sedation. Bicarb gtt increased due to continuing acidosis on ABG. Pt placed in reverse trendelenburg for BP support, has tolerated very well. Hands/feet/face appears dusky/cyanotic MD aware. Bowel sounds inaudible, provider notified and is aware. [facility] transfer nurse updated via telephone. [facility] closely.

## 2021-03-14 NOTE — NURSING NOTE
[facility] called back at this time. Ruled out for donation. Spoke with Bill Curiel. Case number [id number]

## 2021-03-14 NOTE — PROGRESS NOTES
[facility]  PROGRESS NOTE     Patient Identification:  Name:  Janeth Mitchell  Age:  86 y.o.  Sex:  female  :  1935  MRN:  [id number]  Visit Number:  [id number]  Primary Care Provider:  Alexx Tafoya MD    Length of stay:  2    ----------------------------------------------------------------------------------------------------------------------  Subjective     Chief Complaint:  Follow up for septic shock and liver failure    Subjective:  Today, the patient remains severely ill, intubated and on 4 vasopressors.  Has had minimal urine output.    ----------------------------------------------------------------------------------------------------------------------  Objective     Current Hospital Meds:  albumin human, 25 g, Intravenous, Daily  albumin human, 25 g, Intravenous, Daily  albumin human, 25 g, Intravenous, Daily  albumin human, 25 g, Intravenous, Daily  chlorhexidine, 15 mL, Mouth/Throat, Q12H  doxycycline, 100 mg, Intravenous, Q12H  lactulose, 20 g, Rectal, BID  lidocaine PF 1%, 5 mL, Injection, Once  pantoprazole, 40 mg, Intravenous, Q12H  piperacillin-tazobactam, 3.375 g, Intravenous, Q12H  sodium chloride, 10 mL, Intravenous, Q12H      dextrose, 20 mL/hr, Last Rate: 20 mL/hr (21 5314)  EPINEPHrine, 0.02-0.3 mcg/kg/min, Last Rate: Stopped (21)  Midazolam 1 mg/mL 100mL NS, 1-10 mg/hr, Last Rate: 4 mg/hr (21 0850)  norepinephrine, 0.02-0.3 mcg/kg/min, Last Rate: 0.3 mcg/kg/min (21)  [facility]   [facility] to Dose Zosyn,   phenylephrine, 0.5-3 mcg/kg/min, Last Rate: Stopped (21)  vasopressin (PITRESSIN) 20 units in 100 mL infusion, 0.04 Units/min, Last Rate: Stopped (21)      ----------------------------------------------------------------------------------------------------------------------  Vital Signs:  Heart Rate:  [114-140] 119  Resp:  [22-30] 30  BP:  ()/() 97/42  Arterial Line BP: ()/(39-68) 89/39  FiO2 (%):  [60 %-100 %] 60 %  Mean Arterial Pressure (Non-Invasive) for the past 24 hrs (Last 3 readings):   Noninvasive MAP (mmHg)   03/14/21 1810 63   03/14/21 1800 58   03/14/21 1750 59     SpO2 Percentage    03/14/21 1750 03/14/21 1800 03/14/21 1810   SpO2: 97% 97% 97%     SpO2:  [79 %-100 %] 97 %  on   ;   Device (Oxygen Therapy): ventilator    Body mass index is 37.95 kg/m².  Wt Readings from Last 3 Encounters:   03/12/21 88.1 kg (194 lb 4.8 oz)   03/09/21 83.7 kg (184 lb 9.6 oz)   01/23/20 76.4 kg (168 lb 6.4 oz)        Intake/Output Summary (Last 24 hours) at 3/14/2021 1817  Last data filed at 3/14/2021 1326  Gross per 24 hour   Intake 5080.4 ml   Output 100 ml   Net 4980.4 ml     NPO Diet  ----------------------------------------------------------------------------------------------------------------------  Physical exam:   GEN: Critically ill-appearing, intubated and sedated  EYES: Pupils are not responding to light, right conjunctival edema and injection  CV: Tachycardic, thready pulse  PULM: Audible breath sounds bilaterally on ventilator, chest rise equally bilaterally no audible wheeze  GI: Distended abdomen, positive fluid shift, somewhat soft to palpation  SKIN: Severely edematous diffusely, evidence of ischemic changes at end of digits and toes with blue discoloration, cool to touch, peripheral extremity    ----------------------------------------------------------------------------------------------------------------------  Results from last 7 days   Lab Units 03/13/21  1158 03/13/21  0729 03/12/21  1426 03/08/21  2101   TROPONIN T ng/mL  --  0.054* <0.010  <0.010 <0.010   MYOGLOBIN ng/mL 1,597.0*  --   --   --      Results from last 7 days   Lab Units 03/13/21  0729 03/12/21  1426   PROBNP pg/mL 286.1 375.2       Results from last 7 days   Lab Units 03/14/21  0921   PH, ARTERIAL pH units 7.173*   PO2 ART mm Hg 116.0*   PCO2,  ARTERIAL mm Hg 28.5*   HCO3 ART mmol/L 10.5*     Results from last 7 days   Lab Units 03/14/21  0819 03/14/21  0347 03/14/21  0035 03/14/21  0034 03/13/21  2110 03/13/21  0755 03/13/21  0729 03/13/21  0358 03/13/21  0358 03/13/21  0357 03/13/21  0143 03/12/21  1741 03/12/21  1426 03/10/21  0742 03/08/21  1942 03/08/21  1829   CRP mg/dL  --   --   --   --   --   --   --   --   --  6.34*  --   --  6.69* 10.32*   < > 3.61*   LACTATE mmol/L 13.6* 14.1* 14.0*  --   --   --   --    < > 7.3*  --   --   --  3.3* 2.3*  --   --    WBC 10*3/mm3  --   --   --   --   --   --  13.31*  --  14.28*  --  12.61*   < > 13.48* 13.36*   < > 19.05*   HEMOGLOBIN g/dL 12.0 11.3*  --  12.3  --    < > 16.5*  --  12.2  --  12.2   < > 11.9* 12.9   < > 14.0   HEMATOCRIT % 37.4 36.5  --  39.5  --    < > 50.9*  --  37.7  --  37.6   < > 34.8 40.1   < > 40.8   MCV fL  --   --   --   --   --   --  97.3*  --  104.1*  --  103.9*   < > 100.6* 107.2*   < > 100.5*   MCHC g/dL  --   --   --   --   --   --  32.4  --  32.4  --  32.4   < > 34.2 32.2   < > 34.3   PLATELETS 10*3/mm3  --   --   --   --   --   --  132*  --  185  --  158   < > 186 173   < > 212   INR   --   --   --   --  4.28*  --  2.67*  --   --   --   --   --   --   --   --  1.62*    < > = values in this interval not displayed.     Results from last 7 days   Lab Units 03/14/21  0819 03/14/21  0347 03/14/21  0035 03/13/21  0729 03/13/21  0357 03/13/21  0143 03/12/21  1426   SODIUM mmol/L 148* 146* 148* 141 137 138 136   POTASSIUM mmol/L 4.8 4.8 4.8 5.2 5.2 5.1 5.3*   MAGNESIUM mg/dL  --   --   --  1.8 1.9 1.8  --    CHLORIDE mmol/L 109* 108* 108* 108* 107 109* 105   CO2 mmol/L 11.4* 10.3* 10.9* 12.3* 12.2* 14.8* 19.9*   BUN mg/dL 69* 71* 72* 78* 84* 83* 85*   CREATININE mg/dL 3.32* 3.21* 3.23* 3.41* 3.48* 3.44* 3.46*   EGFR IF NONAFRICN AM mL/min/1.73 13* 14* 14* 13* 13* 13* 13*   CALCIUM mg/dL 6.8* 6.8* 7.2* 7.7* 7.9* 8.0* 8.6   GLUCOSE mg/dL 158* 277* 132* 109* 47* 57* 90   ALBUMIN g/dL  --    --   --  2.45* 1.93*  --  1.77*   BILIRUBIN mg/dL  --   --   --  2.7* 2.2*  --  2.0*   ALK PHOS U/L  --   --   --  160* 156*  --  118*   AST (SGOT) U/L  --   --   --  606* 290*  --  80*   ALT (SGPT) U/L  --   --   --  161* 89*  --  36*   Estimated Creatinine Clearance: 12.4 mL/min (A) (by C-G formula based on SCr of 3.32 mg/dL (H)).  Ammonia   Date Value Ref Range Status   03/14/2021 67 (H) 11 - 51 umol/L Final     Comment:     Specimen hemolyzed.  Results may be affected.   03/13/2021 80 (H) 11 - 51 umol/L Final   03/12/2021 54 (H) 11 - 51 umol/L Final       Glucose   Date/Time Value Ref Range Status   03/14/2021 1321 59 (L) 70 - 130 mg/dL Final   03/14/2021 0753 104 70 - 130 mg/dL Final   03/14/2021 0337 74 70 - 130 mg/dL Final   03/14/2021 0012 127 70 - 130 mg/dL Final   03/13/2021 1852 116 70 - 130 mg/dL Final   03/13/2021 1829 48 (C) 70 - 130 mg/dL Final   03/13/2021 1328 88 70 - 130 mg/dL Final   03/13/2021 0601 86 70 - 130 mg/dL Final     No results found for: HGBA1C  No results found for: TSH, FREET4    Blood Culture   Date Value Ref Range Status   03/12/2021 No growth at 2 days  Preliminary   03/12/2021 No growth at 2 days  Preliminary   03/08/2021 No growth at 5 days  Final   03/08/2021 No growth at 5 days  Final     Urine Culture   Date Value Ref Range Status   03/12/2021 No growth  Final     No results found for: WOUNDCX  No results found for: STOOLCX  No results found for: RESPCX  Pain Management Panel    There is no flowsheet data to display.         ----------------------------------------------------------------------------------------------------------------------  Imaging Results (Last 24 Hours)     Procedure Component Value Units Date/Time    XR Chest 1 View [[id number]] Collected: 03/14/21 1110     Updated: 03/14/21 1113    Narrative:      EXAM:    XR Chest, 1 View     EXAM DATE:    3/14/2021 8:42 AM     CLINICAL HISTORY:    intubated; N17.9-Acute kidney failure, unspecified; N39.0-Urinary  tract  infection, site not specified; U07.1-COVID-19; A41.9-Sepsis,  unspecified organism     TECHNIQUE:    Frontal view of the chest.     COMPARISON:    03/13/2021     FINDINGS:    Lungs:  Bilateral interstitial thickening stable.    Pleural space:  Trace effusions are stable.  No pneumothorax.    Heart:  Cardiomegaly and vascular congestion is stable.    Mediastinum:  Unremarkable.    Bones/joints:  Unremarkable.    Tubes, lines and devices:  ET tube with tip just below clavicles.  NG  tube extends into the stomach.  Left IJ deep line positioning is stable.    Upper abdomen:  Elevation of right hemidiaphragm stable.       Impression:      1.  Support devices as above.  2.  Stable chest.     This report was finalized on 3/14/2021 11:11 AM by Dr. Go aVnn MD.             ----------------------------------------------------------------------------------------------------------------------  Assessment/Plan     · septic shock due to SBP  · SBP  · Anuric EMILIA/hepatorenal syndrome  · Uremia due to EMILIA  · Worsening lactic acidosis  · Worsening transaminitis  · Acute upper GI bleed suspected variceal bleed  · Acute hypoxic respiratory failure due to Covid pneumonia  · Acute Covid pneumonia  · DIC due to acute illness  · Coagulopathy due to DIC and liver failure  · Acute mild thrombocytopenia due to DIC  · Elevated D-dimer due to Covid infection  · Acute metabolic acidosis due to EMILIA lactic acidosis  · Possible rhabdomyolysis  · Decompensated liver cirrhosis with new onset ascites  · Severe malnutrition  · Severe hypoalbuminemia  · Possible liver malignancy  · Recent diagnosis of intraductal papillary mucinous neoplasm    Patient continued to have worsening of lactic acidosis, remains on 4 vasopressors and unable to wean down.  Mottling of extremities noted, ischemia of end of digits and toes visible as well as ears and nose.  Family visited on glass door and decided to withdraw care.  Vasopressors were turned off one by  one.  Patient was given comfort care medications for pain and anxiety and was terminally extubated and immediately passed away at 6:39 PM.    I have spent 40 minutes of critical care time.       Katayoun Behbahani, MD  Hospitalist Service -- [facility]     03/14/21  18:17 EDT

## 2021-03-14 NOTE — PROGRESS NOTES
Nephrology Progress Note      Subjective     Patient is intubated on MV, with 60 % FIO2 and PEEP 5, on minimal sedation with GCS of 4-6  Max'd out on 4 pressors and very minimal urine output    Objective       Vital signs :     Temp:  [96.6 °F (35.9 °C)-97.5 °F (36.4 °C)] 97.5 °F (36.4 °C)  Heart Rate:  [120-140] 128  Resp:  [12-30] 30  BP: ()/() 140/66  Arterial Line BP: ()/(16-74) 117/50  FiO2 (%):  [40 %-100 %] 60 %      Intake/Output Summary (Last 24 hours) at 3/14/2021 0738  Last data filed at 3/14/2021 0620  Gross per 24 hour   Intake 9721.88 ml   Output 130 ml   Net 9591.88 ml       Physical Exam:    General: on MV  Heart: Tele reveals sinus rhythm.   Lungs: Respirations appear to be regular, even and unlabored with no signs of respiratory distress. No audible wheezing.    Abdomen: no distension  Extremities: 3+  Skin: No visible bleeding, bruising, or rash.  Neurologic: unable to assess         Laboratory Data :     Albumin Albumin   Date Value Ref Range Status   03/13/2021 2.45 (L) 3.50 - 5.20 g/dL Final   03/13/2021 1.93 (L) 3.50 - 5.20 g/dL Final   03/12/2021 1.77 (L) 3.50 - 5.20 g/dL Final      Magnesium Magnesium   Date Value Ref Range Status   03/13/2021 1.8 1.6 - 2.4 mg/dL Final   03/13/2021 1.9 1.6 - 2.4 mg/dL Final   03/13/2021 1.8 1.6 - 2.4 mg/dL Final          PTH               No results found for: PTH    CBC and coagulation:  Results from last 7 days   Lab Units 03/14/21  0347 03/14/21  0035 03/14/21  0034 03/13/21  2110 03/13/21 2003 03/13/21  0755 03/13/21  0729 03/13/21  0358 03/13/21  0358 03/13/21  0357 03/13/21  0143 03/12/21  1854 03/12/21  1741 03/12/21  1426 03/10/21  0742 03/08/21  1942 03/08/21  1829   PROCALCITONIN ng/mL  --   --   --   --   --   --  0.47*  --   --   --   --  0.87*  --  0.82*  --   --   --    LACTATE mmol/L 14.1* 14.0*  --   --  13.5*  --   --    < > 7.3*  --   --   --   --  3.3* 2.3*  --   --    CRP mg/dL  --   --   --   --   --   --   --   --    --  6.34*  --   --   --  6.69* 10.32*   < > 3.61*   WBC 10*3/mm3  --   --   --   --   --   --  13.31*  --  14.28*  --  12.61*  --    < > 13.48* 13.36*   < > 19.05*   HEMOGLOBIN g/dL 11.3*  --  12.3  --  12.3   < > 16.5*  --  12.2  --  12.2  --    < > 11.9* 12.9   < > 14.0   HEMATOCRIT % 36.5  --  39.5  --  37.9   < > 50.9*  --  37.7  --  37.6  --    < > 34.8 40.1   < > 40.8   MCV fL  --   --   --   --   --   --  97.3*  --  104.1*  --  103.9*  --    < > 100.6* 107.2*   < > 100.5*   MCHC g/dL  --   --   --   --   --   --  32.4  --  32.4  --  32.4  --    < > 34.2 32.2   < > 34.3   PLATELETS 10*3/mm3  --   --   --   --   --   --  132*  --  185  --  158  --    < > 186 173   < > 212   INR   --   --   --  4.28*  --   --  2.67*  --   --   --   --   --   --   --   --   --  1.62*   D DIMER QUANT MCGFEU/mL  --   --   --   --   --   --   --   --   --  >20.00*  --   --   --  >20.00*  --   --   --     < > = values in this interval not displayed.     Acid/base balance:  Results from last 7 days   Lab Units 03/14/21  0350 03/14/21  0013 03/13/21  1936   PH, ARTERIAL pH units 7.193* 7.169* 7.273*   PO2 ART mm Hg 114.0* 136.0* 291.0*   PCO2, ARTERIAL mm Hg 27.3* 32.5* 29.7*   HCO3 ART mmol/L 10.5* 11.8* 13.7*     Renal and electrolytes:  Results from last 7 days   Lab Units 03/14/21  0347 03/14/21  0035 03/13/21 2003 03/13/21  1559 03/13/21  1158 03/13/21  0729 03/13/21  0357 03/13/21  0143   SODIUM mmol/L 146* 148* 146* 150* 149* 141 137 138   POTASSIUM mmol/L 4.8 4.8 4.6 4.8 4.7 5.2 5.2 5.1   MAGNESIUM mg/dL  --   --   --   --   --  1.8 1.9 1.8   CHLORIDE mmol/L 108* 108* 109* 108* 107 108* 107 109*   CO2 mmol/L 10.3* 10.9* 13.5* 13.8* 16.7* 12.3* 12.2* 14.8*   BUN mg/dL 71* 72* 75* 74* 76* 78* 84* 83*   CREATININE mg/dL 3.21* 3.23* 3.24* 3.20* 3.19* 3.41* 3.48* 3.44*   EGFR IF NONAFRICN AM mL/min/1.73 14* 14* 14* 14* 14* 13* 13* 13*   CALCIUM mg/dL 6.8* 7.2* 7.5* 7.6* 8.0* 7.7* 7.9* 8.0*   IONIZED CALCIUM mmol/L  --   --   --    --   --  1.00*  --   --    PHOSPHORUS mg/dL  --   --   --   --   --  6.2*  --  5.4*     Estimated Creatinine Clearance: 12.9 mL/min (A) (by C-G formula based on SCr of 3.21 mg/dL (H)).    Liver and pancreatic function:  Results from last 7 days   Lab Units 03/14/21  0035 03/13/21  0729 03/13/21  0357 03/12/21 1936 03/12/21  1426 03/08/21  1829   ALBUMIN g/dL  --  2.45* 1.93*  --  1.77* 2.36*   BILIRUBIN mg/dL  --  2.7* 2.2*  --  2.0* 2.4*   ALK PHOS U/L  --  160* 156*  --  118* 148*   AST (SGOT) U/L  --  606* 290*  --  80* 74*   ALT (SGPT) U/L  --  161* 89*  --  36* 28   AMMONIA umol/L 67* 80*  --  54*  --   --    LIPASE U/L  --   --   --   --   --  16         Cardiac:  Results from last 7 days   Lab Units 03/13/21  0729 03/12/21  1426   PROBNP pg/mL 286.1 375.2     Liver and pancreatic function:  Results from last 7 days   Lab Units 03/14/21  0035 03/13/21  0729 03/13/21  0357 03/12/21 1936 03/12/21 1426 03/08/21  1829   ALBUMIN g/dL  --  2.45* 1.93*  --  1.77* 2.36*   BILIRUBIN mg/dL  --  2.7* 2.2*  --  2.0* 2.4*   ALK PHOS U/L  --  160* 156*  --  118* 148*   AST (SGOT) U/L  --  606* 290*  --  80* 74*   ALT (SGPT) U/L  --  161* 89*  --  36* 28   AMMONIA umol/L 67* 80*  --  54*  --   --    LIPASE U/L  --   --   --   --   --  16       Medications :     albumin human, 25 g, Intravenous, Daily  albumin human, 25 g, Intravenous, Daily  albumin human, 25 g, Intravenous, Daily  albumin human, 25 g, Intravenous, Daily  chlorhexidine, 15 mL, Mouth/Throat, Q12H  doxycycline, 100 mg, Intravenous, Q12H  lactulose, 20 g, Rectal, BID  lidocaine PF 1%, 5 mL, Injection, Once  pantoprazole, 40 mg, Intravenous, Q12H  piperacillin-tazobactam, 3.375 g, Intravenous, Q12H  sodium chloride, 10 mL, Intravenous, Q12H      dextrose, 20 mL/hr, Last Rate: 20 mL/hr (03/13/21 0638)  EPINEPHrine, 0.02-0.3 mcg/kg/min, Last Rate: 0.3 mcg/kg/min (03/14/21 0516)  Midazolam 1 mg/mL 100mL NS, 1-10 mg/hr, Last Rate: 4 mg/hr (03/13/21  3450)  norepinephrine, 0.02-0.3 mcg/kg/min, Last Rate: 0.3 mcg/kg/min (03/14/21 1353)  Pharmacy Consult - Remdesivir,   Pharmacy to Dose Zosyn,   phenylephrine, 0.5-3 mcg/kg/min, Last Rate: 3 mcg/kg/min (03/14/21 0329)  sodium bicarbonate drip (greater than 75 mEq/bag), 100 mEq, Last Rate: 150 mL/hr at 03/14/21 0447  vasopressin (PITRESSIN) 20 units in 100 mL infusion, 0.04 Units/min, Last Rate: 0.04 Units/min (03/14/21 0329)          Assessment/Plan     1. EMILIA on CKD  2. Sever sepsis with likely septic shock  3. Anion gap metabolic acidosis  4. Decompensated Liver cirrhosis with massive ascites, esophageal varices and likely hepatocellular carcinoma  5. Acute on Chronic anemia likely due to blood loss     Pt condition is very critical with worsening oliguric-anuric ATN, metabolic acidosis and significant fluid overload.   Pt is hemodynamically very unstable and not a candidate for conventional HDx and needs CRRT  Given significant fluid overload, I will dc bicarb infusion to avoid further fluid overload, PRN Bicarb pushes can be given.     EMILIA likely multifactorial, ATN, HRS. Baseline Cr 1-1.2, admitted with Cr 3.4, oliguric.    Lawrence Medina MD  03/14/21  07:38 EDT

## 2021-03-14 NOTE — PROGRESS NOTES
Patient condition remains critical.  Worsening of lactic acidosis.  Increase requirement of vasopressors.  Patient's family opted for withdrawal of care.  We will sign off.  Please call us in case of any other questions.      Bridger Hsu MD

## 2021-03-14 NOTE — NURSING NOTE
[facility] contacted at this time. Spoke with representative, Ernie. Will not be following the patient. Stated we were free to extubate and to call back with cardiac time of death.

## 2021-03-14 NOTE — DISCHARGE SUMMARY
[facility] Medicine Services  DISCHARGE SUMMARY    Patient Identification:  Name:  Janeth Mitchell  Age:  86 y.o.  Sex:  female  :  1935  MRN:  [id number]  Visit Number:  [id number]    Date of Admission: 3/12/2021  Date of Discharge:  3/14/2021    PCP: Alexx Tafoya MD      Admission/Discharge Diagnoses     Discharge Diagnoses:  · septic shock due to SBP  · SBP  · Anuric EMILIA/hepatorenal syndrome  · Uremia due to EMILIA  · Worsening lactic acidosis  · Worsening transaminitis due to shock liver  · Acute upper GI bleed suspected variceal bleed  · Acute hypoxic respiratory failure due to Covid pneumonia  · Acute Covid pneumonia  · DIC due to acute illness  · Coagulopathy due to DIC and liver failure  · Acute mild thrombocytopenia due to DIC  · Elevated D-dimer due to Covid infection  · Acute metabolic acidosis due to EMILIA lactic acidosis  · Possible rhabdomyolysis  · Decompensated liver cirrhosis with new onset ascites  · Severe malnutrition  · Severe hypoalbuminemia  · Possible liver malignancy  · Recent diagnosis of intraductal papillary mucinous neoplasm    Consults/Procedures     Consults:   Consults     Date and Time Order Name Status Description    3/13/2021  9:01 AM Inpatient Anesthesiology Consult      3/13/2021  9:01 AM Inpatient Pulmonology Consult      3/13/2021  8:59 AM Inpatient Nephrology Consult Completed     3/12/2021  8:08 PM Inpatient Palliative Care MD Consult      3/12/2021  6:45 PM Inpatient Palliative Care MD Consult      3/9/2021  4:13 PM Inpatient Hematology & Oncology Consult Completed           Hospital Course     She was diagnosed with Covid infection a few days ago and was admitted to the hospital.  However she remained asymptomatic from Covid and did not require any treatment.  In the meantime she does have history of liver cirrhosis of unclear etiology and was found to have new onset ascites as well as work-up showing new masses in the liver that was  noted on CT scan in the emergency room.  There was also some concern for possible UTI therefore patient was started on antibiotics.  Plan was to perform paracentesis to rule out potential SBP as well as to obtain a formal diagnosis of ascites due to liver cirrhosis and sent for cytology given masses noted in the liver had a possibility of malignancy.  However patient at that time declined to stay for further monitoring and work-up and wanted to leave AGAINST MEDICAL ADVICE.  At that time patient had a slight EMILIA with elevated creatinine at 1.5 (baseline being less than 1) that was another reason medical provided did not feel comfortable discharging patient home.  Unfortunately patient left AGAINST MEDICAL ADVICE as she felt that she would feel better if she went home and rested at home.  The next day she came back to the ER to get her ascites drained and she also felt fatigued and weak more than usual.  In the emergency room she was found to be greatly hypotensive with worsening of renal function and creatinine close to 3.5.  She was also found to have slight hypoxia requiring 2 L nasal cannula oxygen which was thought to be potential consequence of Covid.  She was admitted to progressive care unit with IV fluid resuscitation, IV abx and was started on Covid related treatment.  Unfortunately through the night she had worsening of hypotension resistant to IV fluid resuscitation and was started on vasopressors.  She continued to clinically decline and was transferred to intensive care unit and required more vasopressors, IV antibiotics were changed and broadened.  In the meantime developed an upper GI bleed concerning for possible variceal bleed therefore received volume resuscitation with blood products.  She was also started on octreotide drip and PPI IV.  Upper GI bleed eventually stopped with hemoglobin mainly stable at baseline after volume resuscitation.  However her blood pressure never recovered and she  required to stay on vasopressors.  She eventually became encephalopathic which was attributed due to sepsis as well as rising ammonia.  She was noted to have enlarging ascites.  Paracentesis was done to decrease risk of possible intra-abdominal compartment syndrome however large-volume paracentesis was not performed due to concern for severe volume shift causing worsening of hypotension and renal failure.  However fluid cell count proved spontaneous bacterial peritonitis.  Unfortunately overwhelming septic shock and worsening lactic acidosis did not improve despite broad-spectrum IV antibiotics and aggressive volume resuscitation.  Due to worsening encephalopathy patient was intubated for respiratory protection.  Goals of care discussions took place with family multiple times through the day.  Unfortunately patient continued to decline clinically, started having ischemic changes of toes and digits.  Family was able to see her by glass doors and decided to pursue comfort care measures and withdraw care.  Vasopressors were turned off slowly patient was terminally extubated.  Patient passed away shortly after at 1839.  Family was notified.         Katayoun Behbahani, MD  Hospitalist Service -- [facility]       03/14/21  18:46 EDT

## 2021-03-15 NOTE — PROGRESS NOTES
Case Management Discharge Note      Final Note: Patient  3/14/21.         Selected Continued Care - Discharged on 3/14/2021 Admission date: 3/12/2021 - Discharge disposition:     Destination    No services have been selected for the patient.              Durable Medical Equipment    No services have been selected for the patient.              Dialysis/Infusion    No services have been selected for the patient.              Home Medical Care    No services have been selected for the patient.              Therapy    No services have been selected for the patient.              Community Resources    No services have been selected for the patient.                       Final Discharge Disposition Code: 20 -

## 2021-03-16 LAB
BILIRUB FLD-MCNC: 0.3 MG/DL
FSP PPP-MCNC: 160 UG/ML

## 2021-03-17 LAB
BACTERIA SPEC AEROBE CULT: NORMAL
BACTERIA SPEC AEROBE CULT: NORMAL
BH BB BLOOD EXPIRATION DATE: NORMAL
BH BB BLOOD EXPIRATION DATE: NORMAL
BH BB BLOOD TYPE BARCODE: 6200
BH BB BLOOD TYPE BARCODE: 6200
BH BB DISPENSE STATUS: NORMAL
BH BB DISPENSE STATUS: NORMAL
BH BB PRODUCT CODE: NORMAL
BH BB PRODUCT CODE: NORMAL
BH BB UNIT NUMBER: NORMAL
BH BB UNIT NUMBER: NORMAL
CROSSMATCH INTERPRETATION: NORMAL
CROSSMATCH INTERPRETATION: NORMAL
LAB AP CASE REPORT: NORMAL
UNIT  ABO: NORMAL
UNIT  ABO: NORMAL
UNIT  RH: NORMAL
UNIT  RH: NORMAL

## 2021-03-18 LAB
BACTERIA FLD CULT: NORMAL
BACTERIA SPEC ANAEROBE CULT: NORMAL
GRAM STN SPEC: NORMAL
GRAM STN SPEC: NORMAL

## 2021-04-14 LAB
FUNGUS SPEC CULT: NORMAL
FUNGUS SPEC FUNGUS STN: NORMAL

## 2024-01-03 NOTE — OUTREACH NOTE
Medical Week 2 Survey      Responses   Facility patient discharged from?  [facility]   Does the patient have one of the following disease processes/diagnoses(primary or secondary)?  Other   Week 2 attempt successful?  Yes   Call start time  0941   Discharge diagnosis  Gallstone pancreatitis, Cirrhosis of liver, pseudocyst of pancrease, essential HTN, arthritis, UTI, syncope, s/p ERCP with stent placement   Call end time  0943   Is the patient taking all medications as directed (includes completed medication regime)?  Yes   Comments regarding PCP  pt says she will be making an appt with her PCP this week   Has the patient kept scheduled appointments due by today?  N/A   What is the patient's perception of their health status since discharge?  Improving   Is the patient/caregiver able to teach back the hierarchy of who to call/visit for symptoms/problems? PCP, Specialist, Home health nurse, Urgent Care, ED, 911  Yes   Additional teach back comments  Pt says she is doing ok, no questions or concerns at this time.   Week 2 Call Completed?  Yes          Vivian Bernstein RN   no concerns

## (undated) DEVICE — TRIPLE LUMEN SPHINCTEROTOME: Brand: ULTRATOME XL

## (undated) DEVICE — TRIPLE LUMEN ERCP CANNULA: Brand: TANDEM XL

## (undated) DEVICE — PAPTOME HUIBREGTSE 3L NDL/KNIF 7/5F4 200

## (undated) DEVICE — SINGLE-USE POLYPECTOMY SNARE: Brand: SENSATION SHORT THROW

## (undated) DEVICE — BOWL UTIL STRL 32OZ

## (undated) DEVICE — ERBE NESSY®PLATE 170 SPLIT; 168CM²; CABLE 3M: Brand: ERBE

## (undated) DEVICE — RETRIEVAL BALLOON CATHETER: Brand: EXTRACTOR™ PRO RX

## (undated) DEVICE — GW JAG STR .035IN 450CM

## (undated) DEVICE — SYR LUERLOK 50ML